# Patient Record
Sex: MALE | Race: WHITE | Employment: OTHER | URBAN - METROPOLITAN AREA
[De-identification: names, ages, dates, MRNs, and addresses within clinical notes are randomized per-mention and may not be internally consistent; named-entity substitution may affect disease eponyms.]

---

## 2018-07-02 PROBLEM — R06.02 SHORTNESS OF BREATH: Status: ACTIVE | Noted: 2018-07-02

## 2018-12-03 ENCOUNTER — HOSPITAL ENCOUNTER (OUTPATIENT)
Age: 58
Setting detail: SPECIMEN
Discharge: HOME | End: 2018-12-03
Payer: MEDICARE

## 2018-12-03 LAB
ALBUMIN SERPL-MCNC: 4.5 GM/DL (ref 3.4–5)
ALP BLD-CCNC: 65 IU/L (ref 40–129)
ALT SERPL-CCNC: 9 U/L (ref 10–40)
ANION GAP SERPL CALCULATED.3IONS-SCNC: 10 MMOL/L (ref 4–16)
AST SERPL-CCNC: 12 IU/L (ref 15–37)
BILIRUB SERPL-MCNC: 0.7 MG/DL (ref 0–1)
BUN BLDV-MCNC: 8 MG/DL (ref 6–23)
CALCIUM SERPL-MCNC: 8.9 MG/DL (ref 8.3–10.6)
CHLORIDE BLD-SCNC: 99 MMOL/L (ref 99–110)
CO2: 33 MMOL/L (ref 21–32)
CREAT SERPL-MCNC: 0.7 MG/DL (ref 0.9–1.3)
ERYTHROCYTE SEDIMENTATION RATE: 2 MM/HR (ref 0–20)
FERRITIN: 159 NG/ML (ref 30–400)
GFR AFRICAN AMERICAN: >60 ML/MIN/1.73M2
GFR NON-AFRICAN AMERICAN: >60 ML/MIN/1.73M2
GLUCOSE BLD-MCNC: 139 MG/DL (ref 70–99)
IRON: 77 UG/DL (ref 59–158)
LACTATE DEHYDROGENASE: 145 IU/L (ref 120–246)
PCT TRANSFERRIN: 24 % (ref 10–44)
POTASSIUM SERPL-SCNC: 4.9 MMOL/L (ref 3.5–5.1)
SODIUM BLD-SCNC: 142 MMOL/L (ref 135–145)
TOTAL IRON BINDING CAPACITY: 319 UG/DL (ref 250–450)
TOTAL PROTEIN: 6.8 GM/DL (ref 6.4–8.2)
UNSATURATED IRON BINDING CAPACITY: 242 UG/DL (ref 110–370)

## 2018-12-03 PROCEDURE — 80053 COMPREHEN METABOLIC PANEL: CPT

## 2018-12-03 PROCEDURE — 83550 IRON BINDING TEST: CPT

## 2018-12-03 PROCEDURE — 82668 ASSAY OF ERYTHROPOIETIN: CPT

## 2018-12-03 PROCEDURE — 85652 RBC SED RATE AUTOMATED: CPT

## 2018-12-03 PROCEDURE — 83615 LACTATE (LD) (LDH) ENZYME: CPT

## 2018-12-03 PROCEDURE — 82728 ASSAY OF FERRITIN: CPT

## 2018-12-03 PROCEDURE — 83540 ASSAY OF IRON: CPT

## 2018-12-06 LAB — ERYTHROPOIETIN: 10

## 2018-12-11 ENCOUNTER — HOSPITAL ENCOUNTER (OUTPATIENT)
Dept: ULTRASOUND IMAGING | Age: 58
Discharge: HOME | End: 2018-12-11
Payer: MEDICARE

## 2018-12-11 DIAGNOSIS — D75.1 ERYTHROCYTOSIS: ICD-10-CM

## 2018-12-11 PROCEDURE — 76700 US EXAM ABDOM COMPLETE: CPT

## 2019-01-03 ENCOUNTER — HOSPITAL ENCOUNTER (OUTPATIENT)
Dept: INFUSION THERAPY | Age: 59
Setting detail: INFUSION SERIES
Discharge: HOME | End: 2019-01-03
Payer: MEDICARE

## 2019-01-03 VITALS
DIASTOLIC BLOOD PRESSURE: 69 MMHG | SYSTOLIC BLOOD PRESSURE: 121 MMHG | HEIGHT: 69 IN | HEART RATE: 86 BPM | TEMPERATURE: 98 F | BODY MASS INDEX: 29.33 KG/M2 | RESPIRATION RATE: 16 BRPM | WEIGHT: 198 LBS | OXYGEN SATURATION: 93 %

## 2019-01-03 PROCEDURE — 99211 OFF/OP EST MAY X REQ PHY/QHP: CPT

## 2019-01-03 PROCEDURE — 99195 PHLEBOTOMY: CPT

## 2019-01-03 RX ORDER — TAMSULOSIN HYDROCHLORIDE 0.4 MG/1
0.4 CAPSULE ORAL DAILY
COMMUNITY
End: 2021-04-08

## 2019-01-03 RX ORDER — BUDESONIDE AND FORMOTEROL FUMARATE DIHYDRATE 80; 4.5 UG/1; UG/1
2 AEROSOL RESPIRATORY (INHALATION) 2 TIMES DAILY
COMMUNITY
End: 2020-06-25

## 2019-01-03 NOTE — DISCHARGE SUMMARY
Tolerated Therapeutic Phlebotomy well. Discharge instructions explained written copy given. Understanding verbalized. Discharged home. Down to private auto per self.

## 2019-01-03 NOTE — PROCEDURES
Pre-phlebotomy:  Pulse:  84   Blood Pressure:  125/76    Post-phlebotomy:  Pulse:  86         Blood Pressure 121/69  Volume Removed:  633 grams Tolerated well    Complications:  NONE

## 2019-01-30 ENCOUNTER — HOSPITAL ENCOUNTER (OUTPATIENT)
Age: 59
Setting detail: SPECIMEN
Discharge: HOME | End: 2019-01-30
Payer: MEDICARE

## 2019-01-30 LAB
ALBUMIN SERPL-MCNC: 4.6 GM/DL (ref 3.4–5)
ALP BLD-CCNC: 68 IU/L (ref 40–129)
ALT SERPL-CCNC: 13 U/L (ref 10–40)
ANION GAP SERPL CALCULATED.3IONS-SCNC: 10 MMOL/L (ref 4–16)
AST SERPL-CCNC: 13 IU/L (ref 15–37)
BILIRUB SERPL-MCNC: 0.3 MG/DL (ref 0–1)
BUN BLDV-MCNC: 14 MG/DL (ref 6–23)
CALCIUM SERPL-MCNC: 9.3 MG/DL (ref 8.3–10.6)
CHLORIDE BLD-SCNC: 96 MMOL/L (ref 99–110)
CO2: 35 MMOL/L (ref 21–32)
CREAT SERPL-MCNC: 0.7 MG/DL (ref 0.9–1.3)
GFR AFRICAN AMERICAN: >60 ML/MIN/1.73M2
GFR NON-AFRICAN AMERICAN: >60 ML/MIN/1.73M2
GLUCOSE BLD-MCNC: 142 MG/DL (ref 70–99)
LACTATE DEHYDROGENASE: 153 IU/L (ref 120–246)
POTASSIUM SERPL-SCNC: 5 MMOL/L (ref 3.5–5.1)
SODIUM BLD-SCNC: 141 MMOL/L (ref 135–145)
TOTAL PROTEIN: 7.2 GM/DL (ref 6.4–8.2)

## 2019-01-30 PROCEDURE — 83615 LACTATE (LD) (LDH) ENZYME: CPT

## 2019-01-30 PROCEDURE — 80053 COMPREHEN METABOLIC PANEL: CPT

## 2019-03-04 ENCOUNTER — HOSPITAL ENCOUNTER (OUTPATIENT)
Dept: CT IMAGING | Age: 59
Discharge: HOME | End: 2019-03-04
Payer: MEDICARE

## 2019-03-04 DIAGNOSIS — F17.200 TOBACCO USE DISORDER: ICD-10-CM

## 2019-03-04 PROCEDURE — G0297 LDCT FOR LUNG CA SCREEN: HCPCS

## 2019-06-26 ENCOUNTER — HOSPITAL ENCOUNTER (OUTPATIENT)
Dept: ULTRASOUND IMAGING | Age: 59
Discharge: HOME | End: 2019-06-26
Payer: MEDICARE

## 2019-06-26 DIAGNOSIS — N50.89 TESTICULAR SWELLING, LEFT: ICD-10-CM

## 2019-06-26 PROCEDURE — 76870 US EXAM SCROTUM: CPT

## 2019-06-26 PROCEDURE — 93975 VASCULAR STUDY: CPT

## 2019-06-28 ENCOUNTER — HOSPITAL ENCOUNTER (OUTPATIENT)
Age: 59
Discharge: HOME | End: 2019-06-28
Payer: MEDICARE

## 2019-06-28 LAB — PSA ULTRASENSITIVE: 1.23 NG/ML (ref 0–4)

## 2019-06-28 PROCEDURE — 36415 COLL VENOUS BLD VENIPUNCTURE: CPT

## 2019-06-28 PROCEDURE — 84153 ASSAY OF PSA TOTAL: CPT

## 2019-08-08 ENCOUNTER — HOSPITAL ENCOUNTER (OUTPATIENT)
Age: 59
Discharge: HOME | End: 2019-08-08
Payer: MEDICARE

## 2019-08-08 ENCOUNTER — HOSPITAL ENCOUNTER (OUTPATIENT)
Dept: MRI IMAGING | Age: 59
Discharge: HOME | End: 2019-08-08
Payer: MEDICARE

## 2019-08-08 DIAGNOSIS — D49.59 TESTICULAR NEOPLASM: ICD-10-CM

## 2019-08-08 LAB
GFR AFRICAN AMERICAN: >60 ML/MIN/1.73M2
GFR NON-AFRICAN AMERICAN: >60 ML/MIN/1.73M2
LACTATE DEHYDROGENASE: 156 IU/L (ref 120–246)
POC CREATININE: 1 MG/DL (ref 0.9–1.3)
PSA ULTRASENSITIVE: 1.07 NG/ML (ref 0–4)

## 2019-08-08 PROCEDURE — 83615 LACTATE (LD) (LDH) ENZYME: CPT

## 2019-08-08 PROCEDURE — 84702 CHORIONIC GONADOTROPIN TEST: CPT

## 2019-08-08 PROCEDURE — 84153 ASSAY OF PSA TOTAL: CPT

## 2019-08-08 PROCEDURE — 72195 MRI PELVIS W/O DYE: CPT

## 2019-08-08 PROCEDURE — 82105 ALPHA-FETOPROTEIN SERUM: CPT

## 2019-08-08 PROCEDURE — 36415 COLL VENOUS BLD VENIPUNCTURE: CPT

## 2019-08-10 LAB
HCG TUMOR MARKER: <1 IU/L (ref 0–3)
HCG TUMOR MARKER: NORMAL IU/L (ref 0–3)
MS ALPHA-FETOPROTEIN: 1 NG/ML (ref 0–9)
MS ALPHA-FETOPROTEIN: NORMAL NG/ML (ref 0–9)

## 2019-08-27 ENCOUNTER — HOSPITAL ENCOUNTER (OUTPATIENT)
Age: 59
Setting detail: SPECIMEN
Discharge: HOME | End: 2019-08-27
Payer: MEDICARE

## 2019-08-27 LAB
ALBUMIN SERPL-MCNC: 4.2 GM/DL (ref 3.4–5)
ALP BLD-CCNC: 61 IU/L (ref 40–129)
ALT SERPL-CCNC: 12 U/L (ref 10–40)
ANION GAP SERPL CALCULATED.3IONS-SCNC: 8 MMOL/L (ref 4–16)
AST SERPL-CCNC: 13 IU/L (ref 15–37)
BILIRUB SERPL-MCNC: 0.3 MG/DL (ref 0–1)
BUN BLDV-MCNC: 10 MG/DL (ref 6–23)
CALCIUM SERPL-MCNC: 9.5 MG/DL (ref 8.3–10.6)
CHLORIDE BLD-SCNC: 97 MMOL/L (ref 99–110)
CO2: 34 MMOL/L (ref 21–32)
CREAT SERPL-MCNC: 0.8 MG/DL (ref 0.9–1.3)
GFR AFRICAN AMERICAN: >60 ML/MIN/1.73M2
GFR NON-AFRICAN AMERICAN: >60 ML/MIN/1.73M2
GLUCOSE BLD-MCNC: 121 MG/DL (ref 70–99)
LACTATE DEHYDROGENASE: 156 IU/L (ref 120–246)
POTASSIUM SERPL-SCNC: 5.1 MMOL/L (ref 3.5–5.1)
SODIUM BLD-SCNC: 139 MMOL/L (ref 135–145)
TOTAL PROTEIN: 6.6 GM/DL (ref 6.4–8.2)

## 2019-08-27 PROCEDURE — 80053 COMPREHEN METABOLIC PANEL: CPT

## 2019-08-27 PROCEDURE — 83615 LACTATE (LD) (LDH) ENZYME: CPT

## 2019-12-23 ENCOUNTER — HOSPITAL ENCOUNTER (OUTPATIENT)
Age: 59
Setting detail: SPECIMEN
Discharge: HOME | End: 2019-12-23
Payer: MEDICARE

## 2019-12-23 LAB
ALBUMIN SERPL-MCNC: 4.5 GM/DL (ref 3.4–5)
ALP BLD-CCNC: 72 IU/L (ref 40–129)
ALT SERPL-CCNC: 12 U/L (ref 10–40)
ANION GAP SERPL CALCULATED.3IONS-SCNC: 13 MMOL/L (ref 4–16)
AST SERPL-CCNC: 14 IU/L (ref 15–37)
BILIRUB SERPL-MCNC: 0.5 MG/DL (ref 0–1)
BUN BLDV-MCNC: 12 MG/DL (ref 6–23)
CALCIUM SERPL-MCNC: 9.5 MG/DL (ref 8.3–10.6)
CHLORIDE BLD-SCNC: 93 MMOL/L (ref 99–110)
CO2: 32 MMOL/L (ref 21–32)
CREAT SERPL-MCNC: 0.7 MG/DL (ref 0.9–1.3)
GFR AFRICAN AMERICAN: >60 ML/MIN/1.73M2
GFR NON-AFRICAN AMERICAN: >60 ML/MIN/1.73M2
GLUCOSE BLD-MCNC: 125 MG/DL (ref 70–99)
LACTATE DEHYDROGENASE: 182 IU/L (ref 120–246)
POTASSIUM SERPL-SCNC: 5.3 MMOL/L (ref 3.5–5.1)
SODIUM BLD-SCNC: 138 MMOL/L (ref 135–145)
TOTAL PROTEIN: 7.4 GM/DL (ref 6.4–8.2)

## 2019-12-23 PROCEDURE — 80053 COMPREHEN METABOLIC PANEL: CPT

## 2019-12-23 PROCEDURE — 83615 LACTATE (LD) (LDH) ENZYME: CPT

## 2020-04-14 PROBLEM — F17.210 CONTINUOUS DEPENDENCE ON CIGARETTE SMOKING: Status: ACTIVE | Noted: 2020-04-14

## 2020-04-14 PROBLEM — D75.1 SECONDARY POLYCYTHEMIA: Status: ACTIVE | Noted: 2020-04-14

## 2020-05-27 ENCOUNTER — HOSPITAL ENCOUNTER (OUTPATIENT)
Dept: CT IMAGING | Age: 60
Discharge: HOME | End: 2020-05-27
Payer: MEDICARE

## 2020-05-27 PROCEDURE — G0297 LDCT FOR LUNG CA SCREEN: HCPCS

## 2020-06-05 ENCOUNTER — HOSPITAL ENCOUNTER (OUTPATIENT)
Dept: INFUSION THERAPY | Age: 60
End: 2020-06-05
Payer: MEDICARE

## 2020-06-05 ENCOUNTER — HOSPITAL ENCOUNTER (OUTPATIENT)
Dept: INFUSION THERAPY | Age: 60
Discharge: HOME | End: 2020-06-05
Payer: MEDICARE

## 2020-06-05 LAB
ALBUMIN SERPL-MCNC: 4.4 GM/DL (ref 3.4–5)
ALP BLD-CCNC: 83 IU/L (ref 40–129)
ALT SERPL-CCNC: 20 U/L (ref 10–40)
ANION GAP SERPL CALCULATED.3IONS-SCNC: 10 MMOL/L (ref 4–16)
AST SERPL-CCNC: 18 IU/L (ref 15–37)
BASOPHILS ABSOLUTE: 0.1 K/CU MM
BASOPHILS RELATIVE PERCENT: 1.1 % (ref 0–1)
BILIRUB SERPL-MCNC: 0.5 MG/DL (ref 0–1)
BUN BLDV-MCNC: 11 MG/DL (ref 6–23)
CALCIUM SERPL-MCNC: 8.5 MG/DL (ref 8.3–10.6)
CHLORIDE BLD-SCNC: 94 MMOL/L (ref 99–110)
CO2: 35 MMOL/L (ref 21–32)
CREAT SERPL-MCNC: 0.8 MG/DL (ref 0.9–1.3)
DIFFERENTIAL TYPE: ABNORMAL
EOSINOPHILS ABSOLUTE: 0.5 K/CU MM
EOSINOPHILS RELATIVE PERCENT: 4.7 % (ref 0–3)
GFR AFRICAN AMERICAN: >60 ML/MIN/1.73M2
GFR NON-AFRICAN AMERICAN: >60 ML/MIN/1.73M2
GLUCOSE BLD-MCNC: 240 MG/DL (ref 70–99)
HCT VFR BLD CALC: 57.4 % (ref 42–52)
HEMOGLOBIN: 19.3 GM/DL (ref 13.5–18)
LACTATE DEHYDROGENASE: 209 IU/L (ref 120–246)
LYMPHOCYTES ABSOLUTE: 1.8 K/CU MM
LYMPHOCYTES RELATIVE PERCENT: 17.8 % (ref 24–44)
MCH RBC QN AUTO: 28.4 PG (ref 27–31)
MCHC RBC AUTO-ENTMCNC: 33.6 % (ref 32–36)
MCV RBC AUTO: 84.5 FL (ref 78–100)
MONOCYTES ABSOLUTE: 1.4 K/CU MM
MONOCYTES RELATIVE PERCENT: 13.8 % (ref 0–4)
PDW BLD-RTO: 18.2 % (ref 11.7–14.9)
PLATELET # BLD: 177 K/CU MM (ref 140–440)
PMV BLD AUTO: 9.9 FL (ref 7.5–11.1)
POTASSIUM SERPL-SCNC: 4.9 MMOL/L (ref 3.5–5.1)
RBC # BLD: 6.79 M/CU MM (ref 4.6–6.2)
SEGMENTED NEUTROPHILS ABSOLUTE COUNT: 6.3 K/CU MM
SEGMENTED NEUTROPHILS RELATIVE PERCENT: 62.6 % (ref 36–66)
SODIUM BLD-SCNC: 139 MMOL/L (ref 135–145)
TOTAL PROTEIN: 7.3 GM/DL (ref 6.4–8.2)
WBC # BLD: 10.1 K/CU MM (ref 4–10.5)

## 2020-06-05 PROCEDURE — 80053 COMPREHEN METABOLIC PANEL: CPT

## 2020-06-05 PROCEDURE — 99211 OFF/OP EST MAY X REQ PHY/QHP: CPT

## 2020-06-05 PROCEDURE — 36415 COLL VENOUS BLD VENIPUNCTURE: CPT

## 2020-06-05 PROCEDURE — 83615 LACTATE (LD) (LDH) ENZYME: CPT

## 2020-06-05 PROCEDURE — 85025 COMPLETE CBC W/AUTO DIFF WBC: CPT

## 2020-06-22 NOTE — PROGRESS NOTES
mellitus ([facility] [address]     Enlarged prostate     Hyperlipidemia     Hypertension        Current Medications:      Current Outpatient Medications:     Nebulizers (COMPRESSOR/NEBULIZER) MISC, 1 Units by Does not apply route every 4-6 hours as needed (for use with respiratory nebulized medications -), Disp: 1 each, Rfl: 0    ipratropium-albuterol (DUONEB) 0.5-2.5 (3) MG/3ML SOLN nebulizer solution, Inhale 3 mLs into the lungs every 6 hours, Disp: 360 mL, Rfl: 5    albuterol sulfate HFA (PROVENTIL HFA) 108 (90 Base) MCG/ACT inhaler, Inhale 2 puffs into the lungs every 6 hours as needed for Wheezing or Shortness of Breath, Disp: 1 Inhaler, Rfl: 5    SYMBICORT 160-4.5 MCG/ACT AERO, Inhale 2 puffs into the lungs 2 times daily, Disp: 1 Inhaler, Rfl: 5    tamsulosin (FLOMAX) 0.4 MG capsule, Take 0.4 mg by mouth daily, Disp: , Rfl:     albuterol sulfate HFA (PROVENTIL HFA) 108 (90 Base) MCG/ACT inhaler, Inhale 2 puffs into the lungs every 6 hours as needed for Wheezing or Shortness of Breath, Disp: 1 Inhaler, Rfl: 0    metFORMIN (GLUCOPHAGE) 500 MG tablet, Take 1 tablet by mouth 2 times daily (with meals), Disp: 60 tablet, Rfl: 0    glucose monitoring kit (FREESTYLE) monitoring kit, 1 kit by Does not apply route 4 times daily (with meals and nightly) This can be any brand of glucometer, Disp: 1 kit, Rfl: 0    Lancets MISC, Diabetic Lancets To be used qAC and qHS Can be any brand of lancets, Disp: 100 each, Rfl: 0    blood glucose monitor strips, 1 strip by Other route 4 times daily (with meals and nightly) Can be any brand of glucometer strips, Disp: 100 strip, Rfl: 1    furosemide (LASIX) 20 MG tablet, Take 1 tablet by mouth daily as needed (leg swelling), Disp: 15 tablet, Rfl: 0    Naproxen Sodium (ALEVE) 220 MG CAPS, Take by mouth, Disp: , Rfl:     No Known Allergies    Social History:    Social History     Socioeconomic History    Marital status: Single     Spouse name: None    Number of children: None    Years of education: None    Highest education level: None   Occupational History    None   Social Needs    Financial resource strain: None    Food insecurity     Worry: None     Inability: None    Transportation needs     Medical: None     Non-medical: None   Tobacco Use    Smoking status: Current Every Day Smoker     Packs/day: 0.50     Types: Cigarettes    Smokeless tobacco: Never Used   Substance and Sexual Activity    Alcohol use: No    Drug use: No    Sexual activity: None     Comment: deferred   Lifestyle    Physical activity     Days per week: None     Minutes per session: None    Stress: None   Relationships    Social connections     Talks on phone: None     Gets together: None     Attends Confucianist service: None     Active member of club or organization: None     Attends meetings of clubs or organizations: None     Relationship status: None    Intimate partner violence     Fear of current or ex partner: None     Emotionally abused: None     Physically abused: None     Forced sexual activity: None   Other Topics Concern    None   Social History Narrative    None       Family History:    No family history on file. REVIEW OF SYSTEMS:    CONSTITUTIONAL:  negative for fevers, chills, diaphoresis, activity change, appetite change, night sweats and unexpected weight change.    HEENT:  negative for hearing loss,  sinus pressure, nasal congestion, epistaxis and snoring  RESPIRATORY:  + SOB,   CARDIOVASCULAR:  Negative for chest pain, palpitations, exertional chest pressure/discomfort, edema, syncope  GASTROINTESTINAL: negative for nausea, vomiting, diarrhea, constipation, blood in stool and abdominal pain  GENITOURINARY:  negative for frequency, dysuria and hematuria  HEMATOLOGIC/LYMPHATIC:  negative for easy bruising, bleeding and lymphadenopathy  ALLERGIC/IMMUNOLOGIC:  negative for recurrent infections, angioedema, anaphylaxis and drug reaction  MUSCULOSKELETAL:  negative for  pain, joint swelling,

## 2020-06-25 ENCOUNTER — INITIAL CONSULT (OUTPATIENT)
Dept: PULMONOLOGY | Age: 60
End: 2020-06-25
Payer: MEDICARE

## 2020-06-25 VITALS
SYSTOLIC BLOOD PRESSURE: 134 MMHG | HEART RATE: 116 BPM | HEIGHT: 70 IN | WEIGHT: 223 LBS | DIASTOLIC BLOOD PRESSURE: 62 MMHG | BODY MASS INDEX: 31.92 KG/M2 | OXYGEN SATURATION: 84 %

## 2020-06-25 PROCEDURE — G8417 CALC BMI ABV UP PARAM F/U: HCPCS | Performed by: NURSE PRACTITIONER

## 2020-06-25 PROCEDURE — 99203 OFFICE O/P NEW LOW 30 MIN: CPT | Performed by: NURSE PRACTITIONER

## 2020-06-25 PROCEDURE — 3023F SPIROM DOC REV: CPT | Performed by: NURSE PRACTITIONER

## 2020-06-25 PROCEDURE — G8427 DOCREV CUR MEDS BY ELIG CLIN: HCPCS | Performed by: NURSE PRACTITIONER

## 2020-06-25 PROCEDURE — G8926 SPIRO NO PERF OR DOC: HCPCS | Performed by: NURSE PRACTITIONER

## 2020-06-25 RX ORDER — ALBUTEROL SULFATE 90 UG/1
2 AEROSOL, METERED RESPIRATORY (INHALATION) EVERY 6 HOURS PRN
Qty: 1 INHALER | Refills: 5 | Status: SHIPPED | OUTPATIENT
Start: 2020-06-25 | End: 2021-04-08 | Stop reason: SDUPTHER

## 2020-06-25 RX ORDER — BUDESONIDE AND FORMOTEROL FUMARATE DIHYDRATE 160; 4.5 UG/1; UG/1
2 AEROSOL RESPIRATORY (INHALATION) 2 TIMES DAILY
Qty: 1 INHALER | Refills: 5 | Status: SHIPPED | OUTPATIENT
Start: 2020-06-25 | End: 2021-03-05

## 2020-06-25 RX ORDER — IPRATROPIUM BROMIDE AND ALBUTEROL SULFATE 2.5; .5 MG/3ML; MG/3ML
1 SOLUTION RESPIRATORY (INHALATION) EVERY 6 HOURS
Qty: 360 ML | Refills: 5 | Status: SHIPPED | OUTPATIENT
Start: 2020-06-25 | End: 2021-04-08

## 2020-06-25 RX ORDER — BUDESONIDE AND FORMOTEROL FUMARATE DIHYDRATE 160; 4.5 UG/1; UG/1
2 AEROSOL RESPIRATORY (INHALATION) 2 TIMES DAILY
Qty: 1 INHALER | Refills: 5 | Status: SHIPPED | OUTPATIENT
Start: 2020-06-25 | End: 2020-06-25

## 2020-06-25 NOTE — PATIENT INSTRUCTIONS
Patient Education        Learning About COPD  What is COPD? COPD is a lung disease that makes it hard to breathe. COPD stands for chronic obstructive pulmonary disease. It is caused by damage to the lungs over many years, usually from smoking. COPD is a mix of two diseases: chronic bronchitis and emphysema. Other things that may put you at risk for COPD include breathing chemical fumes, dust, or air pollution over a long period of time. Secondhand smoke is also bad. In chronic bronchitis, the airways that carry air to the lungs (bronchial tubes) get inflamed and make a lot of mucus. This can narrow or block the airways, making it hard for you to breathe. In emphysema, the air sacs in your lungs are damaged and lose their stretch. Less air gets in and out of your lungs, which makes you feel short of breath. What can you expect when you have COPD? COPD gets worse over time. You cannot undo the damage to your lungs. Over time, you may find that:  · You get short of breath even when you do simple things like get dressed or fix a meal.  · It is hard to eat or exercise. · You lose weight and feel weaker. But there are things you can do to prevent more damage and feel better. What are the symptoms? The main symptoms are:  · A cough that will not go away. · Mucus that comes up when you cough. · Shortness of breath that gets worse with activity. At times, your symptoms may suddenly flare up and get much worse. This is a called a COPD exacerbation (say \"egg-ROBERT--BAY-ina\"). When this happens, your usual symptoms quickly get worse and stay bad. This can be dangerous. You may have to go to the hospital.  How can you keep COPD from getting worse? Not smoking is the best way to keep COPD from getting worse. If you need help quitting, talk to your doctor about stop-smoking programs and medicines. Also, be sure to get your flu and pneumococcal vaccines.  And avoid air pollution, fumes, and dust as much as you stroke, blood vessel disease, and blindness from macular degeneration. · When you're smoke-free, you get sick less often, and you heal faster. You are less likely to get colds, flu, bronchitis, and pneumonia. · As a nonsmoker, you may find that your mood is better and you are less stressed. When and how will you feel healthier? Quitting has real health benefits that start from day 1 of being smoke-free. And the longer you stay smoke-free, the healthier you get and the better you feel. The first hours  · After just 20 minutes, your blood pressure and heart rate go down. That means there's less stress on your heart and blood vessels. · Within 12 hours, the level of carbon monoxide in your blood drops back to normal. That makes room for more oxygen. With more oxygen in your body, you may notice that you have more energy than when you smoked. After 2 weeks  · Your lungs start to work better. · Your risk of heart attack starts to drop. After 1 month  · When your lungs are clear, you cough less and breathe deeper, so it's easier to be active. · Your sense of taste and smell return. That means you can enjoy food more than you have since you started smoking. Over the years  · Over the years, your risks of heart disease, heart attack, and stroke are lower. · After 10 years, your risk of dying from lung cancer is cut by about half. And your risk for many other types of cancer is lower too. How would quitting help others in your life? When you quit smoking, you improve the health of everyone who now breathes in your smoke. · Their heart, lung, and cancer risks drop, much like yours. · They are sick less. For babies and small children, living smoke-free means they're less likely to have ear infections, pneumonia, and bronchitis. · If you're a woman who is or will be pregnant someday, quitting smoking means a healthier . · Children who are close to you are less likely to become adult smokers.   Where can

## 2020-06-29 RX ORDER — BUDESONIDE AND FORMOTEROL FUMARATE DIHYDRATE 160; 4.5 UG/1; UG/1
2 AEROSOL RESPIRATORY (INHALATION) 2 TIMES DAILY
Qty: 1 INHALER | Refills: 0 | COMMUNITY
Start: 2020-06-29 | End: 2021-03-05

## 2020-07-09 ENCOUNTER — TELEPHONE (OUTPATIENT)
Dept: PULMONOLOGY | Age: 60
End: 2020-07-09

## 2020-07-09 NOTE — TELEPHONE ENCOUNTER
Spoke with Evelina Ramirez in regards to his apnea link, demonstrates AHI of 1.7 no sleep apnea noted,

## 2020-12-07 NOTE — PROGRESS NOTES
Patient Name: Rustam Sullivan  Patient : 1960  Patient MRN: [id number]     Primary Oncologist: Ella Fischer MD  Referring Provider: No primary care provider on file. Date of Service: 2020      Chief Complaint:   Chief Complaint   Patient presents with    Discuss Labs     Patient Active Problem List:     Shortness of breath     Continuous dependence on cigarette smoking     Secondary polycythemia    HPI:   Mr. Kirti Brewster is a 67-year-old very pleasant gentleman with medical history significant for hypertension, hyperlipidemia, diabetes mellitus, COPD, osteoarthritis, obesity, initially referred to me on December 3, 2018 for evaluation of erythrocytosis. Mr. Kirti Brewster was found to have erythrocytosis since . He recently established care with primary care physician, Dr. Dorothy Francisco on 2018. Routine blood tests done on 2018 showed persistent erythrocytosis. He is a chronic smoker and he started smoking since he was 16. He used to smoke ~ 2 PPD before and he is currently smoking about 10 - 15 cigarettes per day. Since he is found to have persistent erythrocytosis, he was subsequently referred to me for further evaluation. He denied high altitude living. He does not have any family history of erythrocytosis. Laboratory work ups done on 12/3/18 showed normal erythropoietin level, normal ESR, LDH, iron study. JAK2 V617F mutation was negative. Abdominal sonogram done on 18 showed no splenomegaly or splenic mass. Limited evaluation liver, though grossly unremarkable. Focal area of gallbladder wall thickening measuring 5.9 mm, though limited with regards to scanning technique per the technologist. The remainder the gallbladder appears unremarkable. Likely clinically insignificant. If clinically concerned, evaluation for cystic duct obstruction could be performed with nuclear medicine imaging.     Low dose screening CT scan of chest done on 3/4/19 showed 0.4 cm solid nodule in the right lower lobe. Sequelae of granulomatous disease. Minimal coronary atherosclerotic calcifications. Continue annual lung screening with LDCT in 12 months. (probability of malignancy <1%). CT lung screening on May 27, 2020 showed stable appearance of chronic bronchitis with nonobstructive right mainstream bronchus dependent secretions. Stable chronic right hemidiaphragm elevation resulting in chronic lung base atelectasis. No acute thoracic abnormality or findings concerning for malignancy. On December 8, 2020, he presented to me for follow up. I have been following him for secondary erythrocytosis and he received one phlebotomy on 1/3/19. He stated that his symptoms has improved some with phlebotomy. He has been under observation since then. Since low dose CT chest didn't reveal any significant suspicious lesion, I recommend him to have repeat CT chest in March 2021. I recognized that his hematocrit today was 55% and I recommend him to have one phlebotomy. If his symptoms improve with phlebotomy, I will recommend to keep his hematocrit less than 50%. He stopped smoking since 11/2020. Otherwise, I will see him back in 4 months. I will set up for screening CT chest in next office visit. He doesn't have any significant symptoms at today visit. Past Medical History  Significant for  1. Hypertension  2. Hyperlipidemia  3. Diabetes mellitus4. COPD  5. Osteoarthritis  6. Obesity    Surgical History  Significant for  1. Cyst removal from his tailbone area. Allergies  No known medication allergies    Social History  He is a former smoker and he quit smoking on 11/2020. He started smoking since he was 14, until 11/2020. He used to smoke 2 pack/day. He denies alcohol drinking and illicit drug abuse. He is currently living in [facility]. Family History  Grandmother - Maternal (2nd Degree): Breast cancer    Oncology History    No history exists.      Review of Systems: \"Per interval history; otherwise 10 point ROS is negative. \"  His energy level is low, appetite and sleep are fair. He doesn't have fever, chills, night sweats, cough, shortness of breath, chest pain, hemoptysis, or palpitations.  His bowel and bladder functions are normal. He doesn't have nausea, vomiting, diarrhea, constipation, dysuria, loss of appetite, or weight loss. He denies neuropathy and he doesn't have bleeding or clotting issues. He doesn't have any pain on today visit. Denies anxiety or depression. The rest of the systems are unremarkable. Vital Signs:  BP (!) 156/78 (Site: Right Upper Arm, Position: Sitting, Cuff Size: Large Adult)   Pulse 101   Temp 96.7 °F (35.9 °C) (Infrared)   Resp 16   Ht 5' 10\" (1.778 m)   Wt 215 lb 9.6 oz (97.8 kg)   SpO2 (!) 88%   BMI 30.94 kg/m²     Physical Exam:  CONSTITUTIONAL: awake, no apparent distress, alert, cooperative,   EYES: pupils equal, round and reactive to light, sclera clear and conjunctiva normal  ENT: Normocephalic, without obvious abnormality, atraumatic  NECK: supple, symmetrical, no jugular venous distension and no carotid bruits   HEMATOLOGIC/LYMPHATIC: no cervical, supraclavicular or axillary lymphadenopathy   LUNGS: VBS, no wheezes, no crackles, no rhonchi, no increased work of breathing and clear to auscultation   CARDIOVASCULAR: regular rate and rhythm, normal S1 and S2, no murmur noted  ABDOMEN: normal bowel sounds x 4, non-tender, no masses palpated, no hepatosplenomegaly, soft, non-distended,    MUSCULOSKELETAL: full range of motion noted, tone is normal  NEUROLOGIC: awake, alert, oriented to name, place and time. Motor skills grossly intact. SKIN: Normal skin color, texture, turgor and no jaundice.  appears intact   EXTREMITIES: no LE edema, no leg swelling, no cyanosis, no clubbing     Labs:  Hematology:  Lab Results   Component Value Date    WBC 8.5 12/08/2020    RBC 6.59 (H) 12/08/2020    HGB 18.6 (H) 12/08/2020    HCT Abdominal sonogram is also negative for erythropoietin producing tumor. Since all the work ups were negative and I believe his erythrocytosis is due to hypoxemia from chronic cigarette smoking. CT lung screening on May 27, 2020 showed stable appearance of chronic bronchitis with nonobstructive right mainstream bronchus dependent secretions. Stable chronic right hemidiaphragm elevation resulting in chronic lung base atelectasis. No acute thoracic abnormality or findings concerning for malignancy. On December 8, 2020, he presented to me for follow up. I have been following him for secondary erythrocytosis and he received one phlebotomy on 1/3/19. He stated that his symptoms has improved some with phlebotomy. He has been under observation since then. Since low dose CT chest didn't reveal any significant suspicious lesion, I recommend him to have repeat CT chest in March 2021. I recognized that his hematocrit today was 55% and I recommend him to have one phlebotomy. If his symptoms improve with phlebotomy, I will recommend to keep his hematocrit less than 50%. He stopped smoking since 11/2020. Otherwise, I will see him back in 4 months. I will set up for screening CT chest in next office visit. I answered all his questions and concerns for today. I asked him to follow-up with primary care physician on regular basis. Recent imaging and labs were reviewed and discussed with the patient.

## 2020-12-08 ENCOUNTER — HOSPITAL ENCOUNTER (OUTPATIENT)
Dept: INFUSION THERAPY | Age: 60
Discharge: HOME | End: 2020-12-08
Payer: MEDICARE

## 2020-12-08 ENCOUNTER — TELEPHONE (OUTPATIENT)
Dept: INFUSION THERAPY | Age: 60
End: 2020-12-08

## 2020-12-08 ENCOUNTER — OFFICE VISIT (OUTPATIENT)
Dept: ONCOLOGY | Age: 60
End: 2020-12-08
Payer: MEDICARE

## 2020-12-08 ENCOUNTER — CLINICAL DOCUMENTATION (OUTPATIENT)
Dept: ONCOLOGY | Age: 60
End: 2020-12-08

## 2020-12-08 VITALS
HEART RATE: 101 BPM | SYSTOLIC BLOOD PRESSURE: 156 MMHG | TEMPERATURE: 96.7 F | WEIGHT: 215.6 LBS | OXYGEN SATURATION: 88 % | BODY MASS INDEX: 30.86 KG/M2 | DIASTOLIC BLOOD PRESSURE: 78 MMHG | HEIGHT: 70 IN | RESPIRATION RATE: 16 BRPM

## 2020-12-08 DIAGNOSIS — D75.1 SECONDARY POLYCYTHEMIA: ICD-10-CM

## 2020-12-08 LAB
ALBUMIN SERPL-MCNC: 4.3 GM/DL (ref 3.4–5)
ALP BLD-CCNC: 76 IU/L (ref 40–128)
ALT SERPL-CCNC: 14 U/L (ref 10–40)
ANION GAP SERPL CALCULATED.3IONS-SCNC: 12 MMOL/L (ref 4–16)
AST SERPL-CCNC: 15 IU/L (ref 15–37)
BASOPHILS ABSOLUTE: 0.1 K/CU MM
BASOPHILS RELATIVE PERCENT: 0.8 % (ref 0–1)
BILIRUB SERPL-MCNC: 0.8 MG/DL (ref 0–1)
BUN BLDV-MCNC: 9 MG/DL (ref 6–23)
CALCIUM SERPL-MCNC: 9.1 MG/DL (ref 8.3–10.6)
CHLORIDE BLD-SCNC: 94 MMOL/L (ref 99–110)
CO2: 32 MMOL/L (ref 21–32)
CREAT SERPL-MCNC: 0.7 MG/DL (ref 0.9–1.3)
DIFFERENTIAL TYPE: ABNORMAL
EOSINOPHILS ABSOLUTE: 0.4 K/CU MM
EOSINOPHILS RELATIVE PERCENT: 4.8 % (ref 0–3)
ERYTHROCYTE SEDIMENTATION RATE: 4 MM/HR (ref 0–20)
GFR AFRICAN AMERICAN: >60 ML/MIN/1.73M2
GFR NON-AFRICAN AMERICAN: >60 ML/MIN/1.73M2
GLUCOSE BLD-MCNC: 236 MG/DL (ref 70–99)
HCT VFR BLD CALC: 55 % (ref 42–52)
HEMOGLOBIN: 18.6 GM/DL (ref 13.5–18)
LACTATE DEHYDROGENASE: 183 IU/L (ref 120–246)
LYMPHOCYTES ABSOLUTE: 1.8 K/CU MM
LYMPHOCYTES RELATIVE PERCENT: 20.7 % (ref 24–44)
MCH RBC QN AUTO: 28.2 PG (ref 27–31)
MCHC RBC AUTO-ENTMCNC: 33.8 % (ref 32–36)
MCV RBC AUTO: 83.5 FL (ref 78–100)
MONOCYTES ABSOLUTE: 1.3 K/CU MM
MONOCYTES RELATIVE PERCENT: 15 % (ref 0–4)
PDW BLD-RTO: 18.4 % (ref 11.7–14.9)
PLATELET # BLD: 171 K/CU MM (ref 140–440)
PMV BLD AUTO: 9.4 FL (ref 7.5–11.1)
POTASSIUM SERPL-SCNC: 4.4 MMOL/L (ref 3.5–5.1)
RBC # BLD: 6.59 M/CU MM (ref 4.6–6.2)
SEGMENTED NEUTROPHILS ABSOLUTE COUNT: 5 K/CU MM
SEGMENTED NEUTROPHILS RELATIVE PERCENT: 58.7 % (ref 36–66)
SODIUM BLD-SCNC: 138 MMOL/L (ref 135–145)
TOTAL PROTEIN: 6.9 GM/DL (ref 6.4–8.2)
WBC # BLD: 8.5 K/CU MM (ref 4–10.5)

## 2020-12-08 PROCEDURE — 99211 OFF/OP EST MAY X REQ PHY/QHP: CPT

## 2020-12-08 PROCEDURE — 4004F PT TOBACCO SCREEN RCVD TLK: CPT | Performed by: INTERNAL MEDICINE

## 2020-12-08 PROCEDURE — 83615 LACTATE (LD) (LDH) ENZYME: CPT

## 2020-12-08 PROCEDURE — 80053 COMPREHEN METABOLIC PANEL: CPT

## 2020-12-08 PROCEDURE — G8484 FLU IMMUNIZE NO ADMIN: HCPCS | Performed by: INTERNAL MEDICINE

## 2020-12-08 PROCEDURE — G8427 DOCREV CUR MEDS BY ELIG CLIN: HCPCS | Performed by: INTERNAL MEDICINE

## 2020-12-08 PROCEDURE — 3017F COLORECTAL CA SCREEN DOC REV: CPT | Performed by: INTERNAL MEDICINE

## 2020-12-08 PROCEDURE — 85652 RBC SED RATE AUTOMATED: CPT

## 2020-12-08 PROCEDURE — 85025 COMPLETE CBC W/AUTO DIFF WBC: CPT

## 2020-12-08 PROCEDURE — 99213 OFFICE O/P EST LOW 20 MIN: CPT | Performed by: INTERNAL MEDICINE

## 2020-12-08 PROCEDURE — G8417 CALC BMI ABV UP PARAM F/U: HCPCS | Performed by: INTERNAL MEDICINE

## 2020-12-08 PROCEDURE — 36415 COLL VENOUS BLD VENIPUNCTURE: CPT

## 2020-12-08 ASSESSMENT — PATIENT HEALTH QUESTIONNAIRE - PHQ9
SUM OF ALL RESPONSES TO PHQ QUESTIONS 1-9: 0
SUM OF ALL RESPONSES TO PHQ9 QUESTIONS 1 & 2: 0
SUM OF ALL RESPONSES TO PHQ QUESTIONS 1-9: 0
2. FEELING DOWN, DEPRESSED OR HOPELESS: 0
1. LITTLE INTEREST OR PLEASURE IN DOING THINGS: 0
SUM OF ALL RESPONSES TO PHQ QUESTIONS 1-9: 0

## 2020-12-08 NOTE — PROGRESS NOTES
MA Rooming Questions  Patient: Micheal Celeste  MRN: [id number]    Date: 12/8/2020        1. Do you have any new issues?   no         2. Do you need any refills on medications?    no    3. Have you had any imaging done since your last visit?   no    4. Have you been hospitalized or seen in the emergency room since your last visit here?   no    5. Did the patient have a depression screening completed today?  Yes    PHQ-9 Total Score: 0 (12/8/2020  9:35 AM)       PHQ-9 Given to (if applicable):               PHQ-9 Score (if applicable):                     [] Positive     []  Negative              Does question #9 need addressed (if applicable)                     [] Yes    []  No               Willene Brunner, MA

## 2020-12-23 ENCOUNTER — HOSPITAL ENCOUNTER (OUTPATIENT)
Dept: INFUSION THERAPY | Age: 60
Setting detail: INFUSION SERIES
Discharge: HOME | End: 2020-12-23
Payer: MEDICARE

## 2020-12-23 VITALS
RESPIRATION RATE: 16 BRPM | DIASTOLIC BLOOD PRESSURE: 69 MMHG | TEMPERATURE: 97.3 F | SYSTOLIC BLOOD PRESSURE: 119 MMHG | HEART RATE: 101 BPM

## 2020-12-23 DIAGNOSIS — D75.1 SECONDARY POLYCYTHEMIA: Primary | ICD-10-CM

## 2020-12-23 PROCEDURE — 99211 OFF/OP EST MAY X REQ PHY/QHP: CPT

## 2020-12-23 PROCEDURE — 99195 PHLEBOTOMY: CPT

## 2020-12-23 NOTE — PROGRESS NOTES
Pt taken to room 00 for phlebotomy. Pt oriented to room, call light, bed/chair controls, TV, pt voiced understanding. Plan of care explained to pt, pt voiced understanding.

## 2020-12-23 NOTE — PROGRESS NOTES
Diagnosis: POYCYTHEMIA VERA    Pre-Phlebotomy: BP 12/77,     Post-Phlebotomy: /69,     Volume Removed: 051 grams    Complications: NONE    Comments: NONE        LOT# [id number]    Exp: 7-2022      Emma Jones

## 2020-12-23 NOTE — PROGRESS NOTES
Pt tolerated well. Discharged instructions given to pt, pt voiced understanding. Pt discharged via AMBULATORY by SELF TO EXIT.

## 2021-03-05 DIAGNOSIS — J44.9 CHRONIC OBSTRUCTIVE PULMONARY DISEASE, UNSPECIFIED COPD TYPE (HCC): ICD-10-CM

## 2021-03-05 DIAGNOSIS — R09.02 HYPOXIA: ICD-10-CM

## 2021-03-05 RX ORDER — BUDESONIDE AND FORMOTEROL FUMARATE DIHYDRATE 160; 4.5 UG/1; UG/1
AEROSOL RESPIRATORY (INHALATION)
Qty: 10.2 INHALER | Refills: 5 | Status: SHIPPED | OUTPATIENT
Start: 2021-03-05 | End: 2022-01-11

## 2021-04-08 ENCOUNTER — HOSPITAL ENCOUNTER (OUTPATIENT)
Dept: INFUSION THERAPY | Age: 61
Discharge: HOME | End: 2021-04-08
Payer: MEDICARE

## 2021-04-08 ENCOUNTER — OFFICE VISIT (OUTPATIENT)
Dept: ONCOLOGY | Age: 61
End: 2021-04-08
Payer: MEDICARE

## 2021-04-08 VITALS
OXYGEN SATURATION: 80 % | TEMPERATURE: 96.3 F | RESPIRATION RATE: 87 BRPM | HEIGHT: 70 IN | WEIGHT: 227 LBS | SYSTOLIC BLOOD PRESSURE: 149 MMHG | BODY MASS INDEX: 32.5 KG/M2 | HEART RATE: 87 BPM | DIASTOLIC BLOOD PRESSURE: 72 MMHG

## 2021-04-08 DIAGNOSIS — D75.1 SECONDARY POLYCYTHEMIA: ICD-10-CM

## 2021-04-08 DIAGNOSIS — F17.210 CONTINUOUS DEPENDENCE ON CIGARETTE SMOKING: Primary | ICD-10-CM

## 2021-04-08 DIAGNOSIS — F17.210 CONTINUOUS DEPENDENCE ON CIGARETTE SMOKING: ICD-10-CM

## 2021-04-08 LAB
ALBUMIN SERPL-MCNC: 3.6 GM/DL (ref 3.4–5)
ALP BLD-CCNC: 88 IU/L (ref 40–129)
ALT SERPL-CCNC: 17 U/L (ref 10–40)
ANION GAP SERPL CALCULATED.3IONS-SCNC: 6 MMOL/L (ref 4–16)
AST SERPL-CCNC: 17 IU/L (ref 15–37)
BASOPHILS ABSOLUTE: 0.1 K/CU MM
BASOPHILS RELATIVE PERCENT: 0.7 % (ref 0–1)
BILIRUB SERPL-MCNC: 0.5 MG/DL (ref 0–1)
BUN BLDV-MCNC: 14 MG/DL (ref 6–23)
CALCIUM SERPL-MCNC: 8.6 MG/DL (ref 8.3–10.6)
CHLORIDE BLD-SCNC: 91 MMOL/L (ref 99–110)
CO2: 35 MMOL/L (ref 21–32)
CREAT SERPL-MCNC: 0.7 MG/DL (ref 0.9–1.3)
DIFFERENTIAL TYPE: ABNORMAL
EOSINOPHILS ABSOLUTE: 0.4 K/CU MM
EOSINOPHILS RELATIVE PERCENT: 3.6 % (ref 0–3)
ERYTHROCYTE SEDIMENTATION RATE: 0 MM/HR (ref 0–20)
GFR AFRICAN AMERICAN: >60 ML/MIN/1.73M2
GFR NON-AFRICAN AMERICAN: >60 ML/MIN/1.73M2
GLUCOSE BLD-MCNC: 343 MG/DL (ref 70–99)
HCT VFR BLD CALC: 49 % (ref 42–52)
HEMOGLOBIN: 16.9 GM/DL (ref 13.5–18)
LACTATE DEHYDROGENASE: 172 IU/L (ref 120–246)
LYMPHOCYTES ABSOLUTE: 1.8 K/CU MM
LYMPHOCYTES RELATIVE PERCENT: 17.7 % (ref 24–44)
MCH RBC QN AUTO: 27.8 PG (ref 27–31)
MCHC RBC AUTO-ENTMCNC: 34.5 % (ref 32–36)
MCV RBC AUTO: 80.7 FL (ref 78–100)
MONOCYTES ABSOLUTE: 1.5 K/CU MM
MONOCYTES RELATIVE PERCENT: 14.4 % (ref 0–4)
PDW BLD-RTO: 15.9 % (ref 11.7–14.9)
PLATELET # BLD: 194 K/CU MM (ref 140–440)
PMV BLD AUTO: 9.7 FL (ref 7.5–11.1)
POTASSIUM SERPL-SCNC: 4.2 MMOL/L (ref 3.5–5.1)
RBC # BLD: 6.07 M/CU MM (ref 4.6–6.2)
SEGMENTED NEUTROPHILS ABSOLUTE COUNT: 6.4 K/CU MM
SEGMENTED NEUTROPHILS RELATIVE PERCENT: 63.6 % (ref 36–66)
SODIUM BLD-SCNC: 132 MMOL/L (ref 135–145)
TOTAL PROTEIN: 6.5 GM/DL (ref 6.4–8.2)
WBC # BLD: 10.1 K/CU MM (ref 4–10.5)

## 2021-04-08 PROCEDURE — G8417 CALC BMI ABV UP PARAM F/U: HCPCS | Performed by: INTERNAL MEDICINE

## 2021-04-08 PROCEDURE — 83615 LACTATE (LD) (LDH) ENZYME: CPT

## 2021-04-08 PROCEDURE — 80053 COMPREHEN METABOLIC PANEL: CPT

## 2021-04-08 PROCEDURE — 85652 RBC SED RATE AUTOMATED: CPT

## 2021-04-08 PROCEDURE — 99213 OFFICE O/P EST LOW 20 MIN: CPT | Performed by: INTERNAL MEDICINE

## 2021-04-08 PROCEDURE — 85025 COMPLETE CBC W/AUTO DIFF WBC: CPT

## 2021-04-08 PROCEDURE — 3017F COLORECTAL CA SCREEN DOC REV: CPT | Performed by: INTERNAL MEDICINE

## 2021-04-08 PROCEDURE — G8427 DOCREV CUR MEDS BY ELIG CLIN: HCPCS | Performed by: INTERNAL MEDICINE

## 2021-04-08 PROCEDURE — 36415 COLL VENOUS BLD VENIPUNCTURE: CPT

## 2021-04-08 PROCEDURE — 99211 OFF/OP EST MAY X REQ PHY/QHP: CPT

## 2021-04-08 PROCEDURE — 1036F TOBACCO NON-USER: CPT | Performed by: INTERNAL MEDICINE

## 2021-04-08 NOTE — PROGRESS NOTES
Patient Name: Roland Orellana  Patient : 1960  Patient MRN: [id number]     Primary Oncologist: Faiza Kennedy MD  Referring Provider: No primary care provider on file. Date of Service: 2021      Chief Complaint:   Chief Complaint   Patient presents with    Follow-up     Patient Active Problem List:     Shortness of breath     Continuous dependence on cigarette smoking     Secondary polycythemia    HPI:   Mr. Luna Ferreira is a 81-year-old very pleasant gentleman with medical history significant for hypertension, hyperlipidemia, diabetes mellitus, COPD, osteoarthritis, obesity, initially referred to me on December 3, 2018 for evaluation of erythrocytosis. Mr. Luna Ferreira was found to have erythrocytosis since . He recently established care with primary care physician, Dr. Catrachito Maxwell on 2018. Routine blood tests done on 2018 showed persistent erythrocytosis. He is a chronic smoker and he started smoking since he was 16. He used to smoke ~ 2 PPD before and he is currently smoking about 10 - 15 cigarettes per day. Since he is found to have persistent erythrocytosis, he was subsequently referred to me for further evaluation. He denied high altitude living. He does not have any family history of erythrocytosis. Laboratory work ups done on 12/3/18 showed normal erythropoietin level, normal ESR, LDH, iron study. JAK2 V617F mutation was negative. Abdominal sonogram done on 18 showed no splenomegaly or splenic mass. Limited evaluation liver, though grossly unremarkable. Focal area of gallbladder wall thickening measuring 5.9 mm, though limited with regards to scanning technique per the technologist. The remainder the gallbladder appears unremarkable. Likely clinically insignificant. If clinically concerned, evaluation for cystic duct obstruction could be performed with nuclear medicine imaging.     Low dose screening CT scan of chest done on 3/4/19 showed 0.4 cm solid nodule in the right lower lobe. Sequelae of granulomatous disease. Minimal coronary atherosclerotic calcifications. Continue annual lung screening with LDCT in 12 months. (probability of malignancy <1%). CT lung screening on May 27, 2020 showed stable appearance of chronic bronchitis with nonobstructive right mainstream bronchus dependent secretions. Stable chronic right hemidiaphragm elevation resulting in chronic lung base atelectasis. No acute thoracic abnormality or findings concerning for malignancy. On April 8, 2021, he presented to me for follow up. 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 He stated that his symptoms has improved some with phlebotomy. He has been under observation since then. Since he is due for screening CT chest, I will schedule for it as soon as possible and I will follow-up with the results. I recognized that his hematocrit today was 49% and I recommend for observation. He doesn't need phlebotomy this time. If his symptoms improve with phlebotomy, I will recommend to keep his hematocrit less than 50%. He stopped smoking since 1/1/2021. Otherwise, I will see him back in 4 months. He doesn't have any significant symptoms at today visit. Past Medical History  Significant for  1. Hypertension  2. Hyperlipidemia  3. Diabetes mellitus4. COPD  5. Osteoarthritis  6. Obesity    Surgical History  Significant for  1. Cyst removal from his tailbone area. Allergies  No known medication allergies    Social History  He is a former smoker and he quit smoking on 11/2020. He started smoking since he was 14, until 11/2020. He used to smoke 2 pack/day. He denies alcohol drinking and illicit drug abuse. He is currently living in [facility]. Family History  Grandmother - Maternal (2nd Degree): Breast cancer    Oncology History    No history exists. Review of Systems:   \"Per interval history; otherwise 10 point ROS is negative. \"  His energy level is pretty good, appetite and sleep are fine. He denies fever, chills, night sweats, cough, shortness of breath, chest pain, hemoptysis, or palpitations.  His bowel and bladder functions are normal. He denies nausea, vomiting, diarrhea, constipation, dysuria, loss of appetite, or weight loss. He doesn't have neuropathy and he denies bleeding or clotting issues. He denies any pain on today visit. No anxiety or depression. The rest of the systems are unremarkable. Vital Signs:  BP (!) 149/72 (Site: Right Upper Arm, Position: Sitting, Cuff Size: Medium Adult)   Pulse 87   Temp 96.3 °F (35.7 °C) (Temporal)   Resp (!) 87   Ht 5' 10\" (1.778 m)   Wt 227 lb (103 kg)   SpO2 (!) 80%   BMI 32.57 kg/m²     Physical Exam:  CONSTITUTIONAL: awake, no apparent distress, alert, cooperative,   EYES: pupils equal, round and reactive to light, sclera clear and conjunctiva normal  ENT: Normocephalic, without obvious abnormality, atraumatic  NECK: supple, symmetrical, no jugular venous distension and no carotid bruits   HEMATOLOGIC/LYMPHATIC: no cervical, supraclavicular or axillary lymphadenopathy   LUNGS: VBS, no wheezes, no increased work of breathing, no crackles, no rhonchi, clear to auscultation   CARDIOVASCULAR: regular rate and rhythm, normal S1 and S2, no murmur noted  ABDOMEN: normal bowel sounds x 4, non-tender, no masses palpated, no hepatosplenomegaly, soft, non-distended,    MUSCULOSKELETAL: full range of motion noted, tone is normal  NEUROLOGIC: awake, alert, oriented to name, place and time. Motor skills grossly intact. SKIN: Normal skin color, texture, turgor and no jaundice.  appears intact   EXTREMITIES: no leg swelling, no cyanosis, no LE edema, no clubbing     Labs:  Hematology:  Lab Results   Component Value Date    WBC 10.1 04/08/2021    RBC 6.07 04/08/2021    HGB 16.9 04/08/2021    HCT 49.0 04/08/2021    MCV 80.7 04/08/2021    MCH 27.8 04/08/2021 work ups were negative and I believe his erythrocytosis is due to hypoxemia from chronic cigarette smoking. CT lung screening on May 27, 2020 showed stable appearance of chronic bronchitis with nonobstructive right mainstream bronchus dependent secretions. Stable chronic right hemidiaphragm elevation resulting in chronic lung base atelectasis. No acute thoracic abnormality or findings concerning for malignancy. On April 8, 2021, he presented to me for follow up. 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 He stated that his symptoms has improved some with phlebotomy. He has been under observation since then. Since he is due for screening CT chest, I will schedule for it as soon as possible and I will follow-up with the results. I recognized that his hematocrit today was 49% and I recommend for observation. He doesn't need phlebotomy this time. If his symptoms improve with phlebotomy, I will recommend to keep his hematocrit less than 50%. He stopped smoking since 1/1/2021. Otherwise, I will see him back in 4 months. I answered all his questions and concerns for today. I asked him to follow-up with primary care physician on regular basis. Recent imaging and labs were reviewed and discussed with the patient.

## 2021-05-28 ENCOUNTER — HOSPITAL ENCOUNTER (OUTPATIENT)
Dept: CT IMAGING | Age: 61
Discharge: HOME | End: 2021-05-28
Payer: MEDICARE

## 2021-05-28 DIAGNOSIS — F17.210 CONTINUOUS DEPENDENCE ON CIGARETTE SMOKING: ICD-10-CM

## 2021-05-28 PROCEDURE — 71271 CT THORAX LUNG CANCER SCR C-: CPT

## 2021-05-31 NOTE — PROGRESS NOTES
Patient Name: Emily Tadeo  Patient : 1960  Patient MRN: [id number]     Primary Oncologist: Alexandrea Huynh MD  Referring Provider: No primary care provider on file. Date of Service: 2021      Chief Complaint:   Chief Complaint   Patient presents with    Follow-up    Results     CT scans     Patient Active Problem List:     Shortness of breath     Continuous dependence on cigarette smoking     Secondary polycythemia    HPI:   Mr. Dimas Pascual is a 72-year-old very pleasant gentleman with medical history significant for hypertension, hyperlipidemia, diabetes mellitus, COPD, osteoarthritis, obesity, initially referred to me on December 3, 2018 for evaluation of erythrocytosis. Mr. Dimas Pascual was found to have erythrocytosis since . He recently established care with primary care physician, Dr. Chelsy Hoep on 2018. Routine blood tests done on 2018 showed persistent erythrocytosis. He is a chronic smoker and he started smoking since he was 16. He used to smoke ~ 2 PPD before and he is currently smoking about 10 - 15 cigarettes per day. Since he is found to have persistent erythrocytosis, he was subsequently referred to me for further evaluation. He denied high altitude living. He does not have any family history of erythrocytosis. Laboratory work ups done on 12/3/18 showed normal erythropoietin level, normal ESR, LDH, iron study. JAK2 V617F mutation was negative. Abdominal sonogram done on 18 showed no splenomegaly or splenic mass. Limited evaluation liver, though grossly unremarkable. Focal area of gallbladder wall thickening measuring 5.9 mm, though limited with regards to scanning technique per the technologist. The remainder the gallbladder appears unremarkable. Likely clinically insignificant. If clinically concerned, evaluation for cystic duct obstruction could be performed with nuclear medicine imaging.     Low dose screening CT scan of chest done on 3/4/19 showed 0.4 cm solid nodule in the right lower lobe. Sequelae of granulomatous disease. Minimal coronary atherosclerotic calcifications. Continue annual lung screening with LDCT in 12 months. (probability of malignancy <1%). CT lung screening on May 27, 2020 showed stable appearance of chronic bronchitis with nonobstructive right mainstream bronchus dependent secretions. Stable chronic right hemidiaphragm elevation resulting in chronic lung base atelectasis. No acute thoracic abnormality or findings concerning for malignancy. CT lung screening done on May 28, 2021 showed a stable exam.  No acute cardiopulmonary findings or new suspicious pulmonary nodules. Dilated main pulmonary artery which can be seen with pulmonary hypertension. On June 1, 2021, he presented to me for follow up. 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 He stated that his symptoms has improved some with phlebotomy. He has been under observation since then. On today visit, I reviewed with him findings on CT lung screening done on May 28, 2021. There is no sign of malignant process noticed on CT scan and I recommended to have repeat CT lung screening and May 2022. I recognized that his hematocrit today was 50.1% and I recommend for observation. He doesn't need phlebotomy this time. If his symptoms improve with phlebotomy, I will recommend to keep his hematocrit less than 52%. He stopped smoking since 1/1/2021. Otherwise, I will see him back in 6 months. He doesn't have any significant symptoms at today visit. Past Medical History  Significant for  1. Hypertension  2. Hyperlipidemia  3. Diabetes mellitus4. COPD  5. Osteoarthritis  6. Obesity    Surgical History  Significant for  1. Cyst removal from his tailbone area. Allergies  No known medication allergies    Social History  He is a former smoker and he quit smoking on 11/2020.  He started smoking since he was 14, until 11/2020. He used to smoke 2 pack/day. He denies alcohol drinking and illicit drug abuse. He is currently living in [place]. Family History  Grandmother - Maternal (2nd Degree): Breast cancer    Oncology History    No history exists. Review of Systems: \"Per interval history; otherwise 10 point ROS is negative. \"  His energy level is stable, appetite and sleep are good. He doesn't have fever, chills, night sweats, cough, shortness of breath, chest pain, hemoptysis, or palpitations.  His bowel and bladder functions are normal. He doesn't have nausea, vomiting, diarrhea, constipation, dysuria, loss of appetite, or weight loss. He denies neuropathy and he doesn't have bleeding or clotting issues. He doesn't have any pain on today visit. Denies anxiety or depression. The rest of the systems are unremarkable. Vital Signs:  /77 (Site: Right Upper Arm, Position: Sitting, Cuff Size: Medium Adult)   Pulse 100   Temp 97.1 °F (36.2 °C) (Infrared)   Ht 5' 10\" (1.778 m)   Wt 224 lb (101.6 kg)   SpO2 (!) 80% Comment: denies SOB, wears O2 at home  BMI 32.14 kg/m²     Physical Exam:  CONSTITUTIONAL: awake, no apparent distress, alert, cooperative,   EYES: pupils equal, round and reactive to light, sclera clear and conjunctiva normal  ENT: Normocephalic, without obvious abnormality, atraumatic  NECK: supple, symmetrical, no jugular venous distension and no carotid bruits   HEMATOLOGIC/LYMPHATIC: no cervical, supraclavicular or axillary lymphadenopathy   LUNGS: VBS, no wheezes, no increased work of breathing, no crackles, clear to auscultation, no rhonchi,    CARDIOVASCULAR: regular rate and rhythm, normal S1 and S2, no murmur noted  ABDOMEN: normal bowel sounds x 4, non-tender, no masses palpated, no hepatosplenomegaly, soft, non-distended,    MUSCULOSKELETAL: full range of motion noted, tone is normal  NEUROLOGIC: awake, alert, oriented to name, place and time.  Motor skills grossly intact. SKIN: Normal skin color, texture, turgor and no jaundice.  appears intact   EXTREMITIES: no cyanosis, no LE edema, no leg swelling, no clubbing     Labs:  Hematology:  Lab Results   Component Value Date    WBC 8.7 06/01/2021    RBC 6.00 06/01/2021    HGB 16.7 06/01/2021    HCT 50.1 06/01/2021    MCV 83.5 06/01/2021    MCH 27.8 06/01/2021    MCHC 33.3 06/01/2021    RDW 16.4 (H) 06/01/2021     06/01/2021    MPV 9.5 06/01/2021    SEGSPCT 68.5 (H) 06/01/2021    EOSRELPCT 2.7 06/01/2021    BASOPCT 0.8 06/01/2021    LYMPHOPCT 15.5 (L) 06/01/2021    MONOPCT 12.5 (H) 06/01/2021    SEGSABS 6.0 06/01/2021    EOSABS 0.2 06/01/2021    BASOSABS 0.1 06/01/2021    LYMPHSABS 1.4 06/01/2021    MONOSABS 1.1 06/01/2021    DIFFTYPE AUTOMATED DIFFERENTIAL 06/01/2021     Lab Results   Component Value Date    ESR 2 06/01/2021     Chemistry:  Lab Results   Component Value Date     (L) 06/01/2021    K 4.8 06/01/2021    CL 91 (L) 06/01/2021    CO2 37 (H) 06/01/2021    BUN 8 06/01/2021    CREATININE 0.6 (L) 06/01/2021    GLUCOSE 315 (H) 06/01/2021    CALCIUM 8.8 06/01/2021    PROT 6.5 06/01/2021    LABALBU 4.2 06/01/2021    BILITOT 0.5 06/01/2021    ALKPHOS 82 06/01/2021    AST 13 (L) 06/01/2021    ALT 11 06/01/2021    LABGLOM >60 06/01/2021    GFRAA >60 06/01/2021    MG 2.1 07/03/2018    POCGLU 173 (H) 07/06/2018     Lab Results   Component Value Date     06/01/2021     No components found for: LD  Lab Results   Component Value Date    TSHHS 0.749 07/03/2018     Immunology:  Lab Results   Component Value Date    PROT 6.5 06/01/2021     No results found for: MAYA Rodriguez  No results found for: B2M  Coagulation Panel:  No results found for: PROTIME, INR, APTT, DDIMER  Anemia Panel:  No results found for: [id number], FOLATE  Tumor Markers:  No results found for: , CEA, , LABCA2, PSA  Observations:  PHQ-9 Total Score: 0 (6/1/2021  3:40 PM)        Assessment & Plan: Erythrocytosis    PLAN  Mr. Dimas Pascual is a 81-year-old very pleasant patient who was found to have persistent erythrocytosis on routine blood tests done on November 8, 2018. Laboratory work ups done on 12/3/18 showed normal erythropoietin level, normal ESR, LDH, iron study. JAK2 V617F mutation was negative. Abdominal sonogram is also negative for erythropoietin producing tumor. Since all the work ups were negative and I believe his erythrocytosis is due to hypoxemia from chronic cigarette smoking. CT lung screening on May 27, 2020 showed stable appearance of chronic bronchitis with nonobstructive right mainstream bronchus dependent secretions. Stable chronic right hemidiaphragm elevation resulting in chronic lung base atelectasis. No acute thoracic abnormality or findings concerning for malignancy. CT lung screening done on May 28, 2021 showed a stable exam.  No acute cardiopulmonary findings or new suspicious pulmonary nodules. Dilated main pulmonary artery which can be seen with pulmonary hypertension. On June 1, 2021, he presented to me for follow up. 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 He stated that his symptoms has improved some with phlebotomy. He has been under observation since then. On today visit, I reviewed with him findings on CT lung screening done on May 28, 2021. There is no sign of malignant process noticed on CT scan and I recommended to have repeat CT lung screening and May 2022. I recognized that his hematocrit today was 50.1% and I recommend for observation. He doesn't need phlebotomy this time. If his symptoms improve with phlebotomy, I will recommend to keep his hematocrit less than 52%. He stopped smoking since 1/1/2021. Otherwise, I will see him back in 6 months. I answered all his questions and concerns for today.  I asked him to follow-up with primary care physician on regular basis. Recent imaging and labs were reviewed and discussed with the patient.

## 2021-06-01 ENCOUNTER — HOSPITAL ENCOUNTER (OUTPATIENT)
Dept: INFUSION THERAPY | Age: 61
Discharge: HOME | End: 2021-06-01
Payer: MEDICARE

## 2021-06-01 ENCOUNTER — OFFICE VISIT (OUTPATIENT)
Dept: ONCOLOGY | Age: 61
End: 2021-06-01
Payer: MEDICARE

## 2021-06-01 VITALS
TEMPERATURE: 97.1 F | WEIGHT: 224 LBS | OXYGEN SATURATION: 80 % | DIASTOLIC BLOOD PRESSURE: 77 MMHG | HEIGHT: 70 IN | SYSTOLIC BLOOD PRESSURE: 136 MMHG | HEART RATE: 100 BPM | BODY MASS INDEX: 32.07 KG/M2

## 2021-06-01 DIAGNOSIS — D75.1 SECONDARY POLYCYTHEMIA: ICD-10-CM

## 2021-06-01 DIAGNOSIS — D75.1 SECONDARY POLYCYTHEMIA: Primary | ICD-10-CM

## 2021-06-01 LAB
ALBUMIN SERPL-MCNC: 4.2 GM/DL (ref 3.4–5)
ALP BLD-CCNC: 82 IU/L (ref 40–129)
ALT SERPL-CCNC: 11 U/L (ref 10–40)
ANION GAP SERPL CALCULATED.3IONS-SCNC: 6 MMOL/L (ref 4–16)
AST SERPL-CCNC: 13 IU/L (ref 15–37)
BASOPHILS ABSOLUTE: 0.1 K/CU MM
BASOPHILS RELATIVE PERCENT: 0.8 % (ref 0–1)
BILIRUB SERPL-MCNC: 0.5 MG/DL (ref 0–1)
BUN BLDV-MCNC: 8 MG/DL (ref 6–23)
CALCIUM SERPL-MCNC: 8.8 MG/DL (ref 8.3–10.6)
CHLORIDE BLD-SCNC: 91 MMOL/L (ref 99–110)
CO2: 37 MMOL/L (ref 21–32)
CREAT SERPL-MCNC: 0.6 MG/DL (ref 0.9–1.3)
DIFFERENTIAL TYPE: ABNORMAL
EOSINOPHILS ABSOLUTE: 0.2 K/CU MM
EOSINOPHILS RELATIVE PERCENT: 2.7 % (ref 0–3)
ERYTHROCYTE SEDIMENTATION RATE: 2 MM/HR (ref 0–20)
GFR AFRICAN AMERICAN: >60 ML/MIN/1.73M2
GFR NON-AFRICAN AMERICAN: >60 ML/MIN/1.73M2
GLUCOSE BLD-MCNC: 315 MG/DL (ref 70–99)
HCT VFR BLD CALC: 50.1 % (ref 42–52)
HEMOGLOBIN: 16.7 GM/DL (ref 13.5–18)
LACTATE DEHYDROGENASE: 187 IU/L (ref 120–246)
LYMPHOCYTES ABSOLUTE: 1.4 K/CU MM
LYMPHOCYTES RELATIVE PERCENT: 15.5 % (ref 24–44)
MCH RBC QN AUTO: 27.8 PG (ref 27–31)
MCHC RBC AUTO-ENTMCNC: 33.3 % (ref 32–36)
MCV RBC AUTO: 83.5 FL (ref 78–100)
MONOCYTES ABSOLUTE: 1.1 K/CU MM
MONOCYTES RELATIVE PERCENT: 12.5 % (ref 0–4)
PDW BLD-RTO: 16.4 % (ref 11.7–14.9)
PLATELET # BLD: 197 K/CU MM (ref 140–440)
PMV BLD AUTO: 9.5 FL (ref 7.5–11.1)
POTASSIUM SERPL-SCNC: 4.8 MMOL/L (ref 3.5–5.1)
RBC # BLD: 6 M/CU MM (ref 4.6–6.2)
SEGMENTED NEUTROPHILS ABSOLUTE COUNT: 6 K/CU MM
SEGMENTED NEUTROPHILS RELATIVE PERCENT: 68.5 % (ref 36–66)
SODIUM BLD-SCNC: 134 MMOL/L (ref 135–145)
TOTAL PROTEIN: 6.5 GM/DL (ref 6.4–8.2)
WBC # BLD: 8.7 K/CU MM (ref 4–10.5)

## 2021-06-01 PROCEDURE — G8417 CALC BMI ABV UP PARAM F/U: HCPCS | Performed by: INTERNAL MEDICINE

## 2021-06-01 PROCEDURE — 36415 COLL VENOUS BLD VENIPUNCTURE: CPT

## 2021-06-01 PROCEDURE — 99211 OFF/OP EST MAY X REQ PHY/QHP: CPT

## 2021-06-01 PROCEDURE — 85652 RBC SED RATE AUTOMATED: CPT

## 2021-06-01 PROCEDURE — G8427 DOCREV CUR MEDS BY ELIG CLIN: HCPCS | Performed by: INTERNAL MEDICINE

## 2021-06-01 PROCEDURE — 80053 COMPREHEN METABOLIC PANEL: CPT

## 2021-06-01 PROCEDURE — 99213 OFFICE O/P EST LOW 20 MIN: CPT | Performed by: INTERNAL MEDICINE

## 2021-06-01 PROCEDURE — 83615 LACTATE (LD) (LDH) ENZYME: CPT

## 2021-06-01 PROCEDURE — 3017F COLORECTAL CA SCREEN DOC REV: CPT | Performed by: INTERNAL MEDICINE

## 2021-06-01 PROCEDURE — 85025 COMPLETE CBC W/AUTO DIFF WBC: CPT

## 2021-06-01 PROCEDURE — 1036F TOBACCO NON-USER: CPT | Performed by: INTERNAL MEDICINE

## 2021-06-01 ASSESSMENT — PATIENT HEALTH QUESTIONNAIRE - PHQ9
2. FEELING DOWN, DEPRESSED OR HOPELESS: 0
SUM OF ALL RESPONSES TO PHQ QUESTIONS 1-9: 0
SUM OF ALL RESPONSES TO PHQ QUESTIONS 1-9: 0
1. LITTLE INTEREST OR PLEASURE IN DOING THINGS: 0
SUM OF ALL RESPONSES TO PHQ QUESTIONS 1-9: 0
SUM OF ALL RESPONSES TO PHQ9 QUESTIONS 1 & 2: 0

## 2021-06-01 NOTE — PROGRESS NOTES
MA Rooming Questions  Patient: Reyna Watts  MRN: [id number]    Date: 6/1/2021        1. Do you have any new issues?   no         2. Do you need any refills on medications?    no    3. Have you had any imaging done since your last visit? yes - ct SCAN    4. Have you been hospitalized or seen in the emergency room since your last visit here?   no    5. Did the patient have a depression screening completed today?  Yes    PHQ-9 Total Score: 0 (6/1/2021  3:40 PM)       PHQ-9 Given to (if applicable):               PHQ-9 Score (if applicable):                     [] Positive     [x]  Negative              Does question #9 need addressed (if applicable)                     [] Yes    []  No               Melba Singer CMA

## 2021-06-22 ENCOUNTER — HOSPITAL ENCOUNTER (OUTPATIENT)
Dept: PULMONOLOGY | Age: 61
Discharge: HOME | End: 2021-06-22
Payer: MEDICARE

## 2021-06-22 PROCEDURE — 94618 PULMONARY STRESS TESTING: CPT

## 2021-06-22 NOTE — PROGRESS NOTES
6/22/2021 3:08 PM  Patient Room #: Room/bed info not found  Patient Name: Alexis Guardado    (Step 1 Done by RN if possible otherwise call Pulmonary Diagnostics)  1. Place patient on room air at rest for at least 30 minutes. If patient falls below 88% before 30 minutes then you can record the level and stop. Record room air saturation level _85_ %. If patient is at 88% or below, they will qualify for home oxygen and you can stop. If level does not fall below 88%, fill in level above. If indicated continue to Step 2. Signature:_Nimesh Rabago RRT____ Date: _06/22/2021__  (Step 2&3 Done by P)  2. Ambulate patient on room air until saturation falls below 89%. Record level of room air saturation with ambulation___ %. Next, place patient back on ___lpm oxygen and ambulate, record level __%. (Note:  this level must show improvement from room air level done with ambulation.)  If patients saturation on room air with ambulation is 88% or below AND patient shows improvement with oxygen during ambulation, they will qualify for home oxygen and you can stop. If patient does not drop below 89%, then patient should have an overnight oximetry trending on room air to see if level falls below 88%. Complete level in Step 3 below. 3. Room air overnight oximetry level 88 % for___  cumulative minutes. If patients room air oxygen level is < 89% for at least 5 cumulative minutes, patient will qualify for home oxygen and you can stop. (Attach Night Trending Report)    Complete order below: Diagnosis:____COPD  Home oxygen at:  Length of Need: ?X Lifetime ?  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

## 2021-08-02 ENCOUNTER — TELEPHONE (OUTPATIENT)
Dept: PULMONOLOGY | Age: 61
End: 2021-08-02

## 2021-08-02 ENCOUNTER — OFFICE VISIT (OUTPATIENT)
Dept: FAMILY MEDICINE CLINIC | Age: 61
End: 2021-08-02
Payer: MEDICARE

## 2021-08-02 VITALS
OXYGEN SATURATION: 91 % | HEART RATE: 103 BPM | DIASTOLIC BLOOD PRESSURE: 82 MMHG | WEIGHT: 223.2 LBS | BODY MASS INDEX: 32.03 KG/M2 | SYSTOLIC BLOOD PRESSURE: 125 MMHG

## 2021-08-02 DIAGNOSIS — J44.9 CHRONIC OBSTRUCTIVE PULMONARY DISEASE, UNSPECIFIED COPD TYPE (HCC): ICD-10-CM

## 2021-08-02 DIAGNOSIS — N50.89 TESTICULAR SWELLING, LEFT: ICD-10-CM

## 2021-08-02 DIAGNOSIS — B37.42 CANDIDIASIS OF PENIS: ICD-10-CM

## 2021-08-02 DIAGNOSIS — E11.65 UNCONTROLLED TYPE 2 DIABETES MELLITUS WITH HYPERGLYCEMIA (HCC): Primary | ICD-10-CM

## 2021-08-02 LAB — HBA1C MFR BLD: 12.8 %

## 2021-08-02 PROCEDURE — 2022F DILAT RTA XM EVC RTNOPTHY: CPT | Performed by: FAMILY MEDICINE

## 2021-08-02 PROCEDURE — 1036F TOBACCO NON-USER: CPT | Performed by: FAMILY MEDICINE

## 2021-08-02 PROCEDURE — G8427 DOCREV CUR MEDS BY ELIG CLIN: HCPCS | Performed by: FAMILY MEDICINE

## 2021-08-02 PROCEDURE — 3017F COLORECTAL CA SCREEN DOC REV: CPT | Performed by: FAMILY MEDICINE

## 2021-08-02 PROCEDURE — 3046F HEMOGLOBIN A1C LEVEL >9.0%: CPT | Performed by: FAMILY MEDICINE

## 2021-08-02 PROCEDURE — 3023F SPIROM DOC REV: CPT | Performed by: FAMILY MEDICINE

## 2021-08-02 PROCEDURE — 83036 HEMOGLOBIN GLYCOSYLATED A1C: CPT | Performed by: FAMILY MEDICINE

## 2021-08-02 PROCEDURE — 99204 OFFICE O/P NEW MOD 45 MIN: CPT | Performed by: FAMILY MEDICINE

## 2021-08-02 PROCEDURE — G8926 SPIRO NO PERF OR DOC: HCPCS | Performed by: FAMILY MEDICINE

## 2021-08-02 PROCEDURE — G8417 CALC BMI ABV UP PARAM F/U: HCPCS | Performed by: FAMILY MEDICINE

## 2021-08-02 RX ORDER — GLUCOSAMINE HCL/CHONDROITIN SU 500-400 MG
CAPSULE ORAL
Qty: 100 STRIP | Refills: 0 | Status: SHIPPED | OUTPATIENT
Start: 2021-08-02 | End: 2022-01-17 | Stop reason: SDUPTHER

## 2021-08-02 RX ORDER — NYSTATIN 100000 U/G
CREAM TOPICAL
Qty: 30 G | Refills: 0 | Status: SHIPPED | OUTPATIENT
Start: 2021-08-02 | End: 2022-01-11

## 2021-08-02 RX ORDER — METFORMIN HYDROCHLORIDE 500 MG/1
1000 TABLET, EXTENDED RELEASE ORAL 2 TIMES DAILY
Qty: 120 TABLET | Refills: 5 | Status: SHIPPED | OUTPATIENT
Start: 2021-08-02 | End: 2021-08-27

## 2021-08-02 RX ORDER — FLUCONAZOLE 100 MG/1
100 TABLET ORAL DAILY
Qty: 3 TABLET | Refills: 0 | Status: SHIPPED | OUTPATIENT
Start: 2021-08-02 | End: 2021-08-05

## 2021-08-02 RX ORDER — METFORMIN HYDROCHLORIDE 500 MG/1
500 TABLET, EXTENDED RELEASE ORAL 2 TIMES DAILY
COMMUNITY
End: 2021-08-02 | Stop reason: SDUPTHER

## 2021-08-02 NOTE — PROGRESS NOTES
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 Stable.   Otherwise, good ocular health in each eye.  No evidence of diabetic or hypertensive retinal changes in either eye.  Hyperopia ("farsightedness") in each eye, greater in the left eye.  Note stable refractive error in the right eye, and need for stronger Rx in the left eye.   Good (and stable) best-corrected VA in each eye.   Presbyopia consistent with age.   New spectacle lens Rx issued.  Repeat general eye examination and refraction in one year.      " Grover Bojorquez  22    Chief Complaint   Patient presents with    New Patient     Patient here to establish care with PCP    Other     Patient states having an infection            62 yrs old man with PMH of DM, and COPD came today to establish with us, used to be on Metformin, but he is out for more than a year, because he Could not get the refill, patient also complaining of infection in his penis, itching, pain, no discharge, no fever, no abdominal pain, no nausea or vomiting. And this infection going on for a while, did see a urologist a year ago and put him on the Flomax, as he is out. He is not watching his diet for the diabetes. Past Medical History:   Diagnosis Date    Arthritis     Cataract     [facility]ch[facility] obstructive [facility] disease) [facility] [address]     Diabetes mellitus ([facility] [address]     Enlarged prostate     Hyperlipidemia     Hypertension      Past Surgical History:   Procedure Laterality Date    COLONOSCOPY      SKIN BIOPSY      TONSILLECTOMY       History reviewed. No pertinent family history.   Social History     Socioeconomic History    Marital status: Single     Spouse name: Not on file    Number of children: Not on file    Years of education: Not on file    Highest education level: Not on file   Occupational History    Not on file   Tobacco Use    Smoking status: Former Smoker     Packs/day: 0.50     Years: 47.00     Pack years: 23.50     Types: Cigarettes     Start date: 1973     Quit date: 2021     Years since quittin.5    Smokeless tobacco: Never Used   Substance and Sexual Activity    Alcohol use: No    Drug use: No    Sexual activity: Not on file     Comment: deferred   Other Topics Concern    Not on file   Social History Narrative    Not on file     Social Determinants of Health     Financial Resource Strain:     Difficulty of Paying Living Expenses:    Food Insecurity:     Worried About Running Out of Food in the Last Year:     Juancarlos of Food in the Last Year:    Transportation Needs:     Lack of Transportation (Medical):  Lack of Transportation (Non-Medical):    Physical Activity:     Days of Exercise per Week:     Minutes of Exercise per Session:    Stress:     Feeling of Stress :    Social Connections:     Frequency of Communication with Friends and Family:     Frequency of Social Gatherings with Friends and Family:     Attends [employer] Services:     Active Member of Clubs or Organizations:     Attends Club or Organization Meetings:     Marital Status:    Intimate Partner Violence:     Fear of Current or Ex-Partner:     Emotionally Abused:     Physically Abused:     Sexually Abused:        No Known Allergies  Current Outpatient Medications   Medication Sig Dispense Refill    albuterol (PROVENTIL) (5 MG/ML) 0.5% nebulizer solution Take 0.5 mLs by nebulization every 6 hours as needed for Wheezing 120 each 3    metFORMIN (GLUCOPHAGE-XR) 500 MG extended release tablet Take 2 tablets by mouth 2 times daily 120 tablet 5    blood glucose monitor strips Test 3 times a day & as needed for symptoms of irregular blood glucose. Please fill with what is coverd by insurance 100 strip 0    fluconazole (DIFLUCAN) 100 MG tablet Take 1 tablet by mouth daily for 3 days 3 tablet 0    nystatin (MYCOSTATIN) 315870 UNIT/GM cream Apply topically 2 times daily. To the affected area 30 g 0    SYMBICORT 160-4.5 MCG/ACT AERO TAKE 2 PUFFS BY MOUTH TWICE A DAY 10.2 Inhaler 5     No current facility-administered medications for this visit. Review of Systems   Constitutional: Negative for activity change, appetite change, chills, fatigue and fever. HENT: Negative for congestion, sinus pressure and sore throat. Respiratory: Negative for cough, chest tightness and shortness of breath. Cardiovascular: Negative for chest pain and leg swelling. Gastrointestinal: Negative for abdominal pain, constipation and diarrhea.    Genitourinary: Normocephalic and atraumatic. Eyes:      General: No scleral icterus. Pupils: Pupils are equal, round, and reactive to light. Cardiovascular:      Rate and Rhythm: Normal rate and regular rhythm. Heart sounds: Normal heart sounds. No murmur heard. Pulmonary:      Effort: Pulmonary effort is normal.      Breath sounds: Normal breath sounds. No wheezing. Abdominal:      General: There is no distension. Palpations: Abdomen is soft. There is no mass. Tenderness: There is no abdominal tenderness. Genitourinary:     Comments: White thick material cover all the penis with erythema base, and has left testicle swelling, with some tenderness  Musculoskeletal:         General: Normal range of motion. Cervical back: Normal range of motion and neck supple. No rigidity. Right lower leg: No edema. Left lower leg: No edema. Neurological:      General: No focal deficit present. Mental Status: He is alert and oriented to person, place, and time. Psychiatric:         Mood and Affect: Mood normal.         Behavior: Behavior normal.         Thought Content: Thought content normal.         ASSESSMENT/ PLAN:    1. Uncontrolled type 2 diabetes mellitus with hyperglycemia ([facility] [address]  - will try the metformin for 2 months, refuse the diabetic education  - metFORMIN (GLUCOPHAGE-XR) 500 MG extended release tablet; Take 2 tablets by mouth 2 times daily  Dispense: 120 tablet; Refill: 5  - blood glucose monitor strips; Test 3 times a day & as needed for symptoms of irregular blood glucose. Please fill with what is coverd by insurance  Dispense: 100 strip; Refill: 0  - POCT glycosylated hemoglobin (Hb A1C)    2. Chronic obstructive pulmonary disease, unspecified COPD type (HCC)  - stable  - albuterol (PROVENTIL) (5 MG/ML) 0.5% nebulizer solution; Take 0.5 mLs by nebulization every 6 hours as needed for Wheezing  Dispense: 120 each; Refill: 3    3.  Testicular swelling, left  - AFL - Leighann Buitrago, MD, Urology, [place]    4. Candidiasis of penis  - start:  - fluconazole (DIFLUCAN) 100 MG tablet; Take 1 tablet by mouth daily for 3 days  Dispense: 3 tablet; Refill: 0  - nystatin (MYCOSTATIN) 964151 UNIT/GM cream; Apply topically 2 times daily. To the affected area  Dispense: 30 g; Refill: 0              - All old blood work reviewed with the patient  - Appropriate prescription are addressed. - After visit summery provided. - Questions answered and patient verbalizes understanding.  - Call for any problem, questions, or concerns.  - RTC if symptoms worse. Return in about 2 months (around 10/2/2021).

## 2021-08-02 NOTE — TELEPHONE ENCOUNTER
Patient came into the office and advised he is now back from [place] and would like to have the test completed now he was needing.

## 2021-08-03 ENCOUNTER — TELEPHONE (OUTPATIENT)
Dept: FAMILY MEDICINE CLINIC | Age: 61
End: 2021-08-03

## 2021-08-03 PROBLEM — N50.89 TESTICULAR SWELLING, LEFT: Status: ACTIVE | Noted: 2021-08-03

## 2021-08-03 PROBLEM — E11.65 UNCONTROLLED TYPE 2 DIABETES MELLITUS WITH HYPERGLYCEMIA (HCC): Status: ACTIVE | Noted: 2021-08-03

## 2021-08-03 ASSESSMENT — ENCOUNTER SYMPTOMS
BACK PAIN: 0
CONSTIPATION: 0
CHEST TIGHTNESS: 0
ABDOMINAL PAIN: 0
SINUS PRESSURE: 0
COUGH: 0
SORE THROAT: 0
SHORTNESS OF BREATH: 0
DIARRHEA: 0

## 2021-08-05 ENCOUNTER — TELEPHONE (OUTPATIENT)
Dept: PULMONOLOGY | Age: 61
End: 2021-08-05

## 2021-08-05 DIAGNOSIS — Z01.818 PREOP TESTING: ICD-10-CM

## 2021-08-05 DIAGNOSIS — J44.9 CHRONIC OBSTRUCTIVE PULMONARY DISEASE, UNSPECIFIED COPD TYPE (HCC): Primary | ICD-10-CM

## 2021-08-05 NOTE — TELEPHONE ENCOUNTER
Liana from central scheduling needs a new order for a PFT and covid order so she can get him scheduled for that.   Please advise

## 2021-08-17 DIAGNOSIS — Z01.811 PREOP PULMONARY/RESPIRATORY EXAM: Primary | ICD-10-CM

## 2021-08-27 ENCOUNTER — HOSPITAL ENCOUNTER (OUTPATIENT)
Dept: LAB | Age: 61
Setting detail: SPECIMEN
Discharge: HOME | End: 2021-08-27
Payer: MEDICARE

## 2021-08-27 DIAGNOSIS — E11.65 UNCONTROLLED TYPE 2 DIABETES MELLITUS WITH HYPERGLYCEMIA (HCC): ICD-10-CM

## 2021-08-27 PROCEDURE — U0005 INFEC AGEN DETEC AMPLI PROBE: HCPCS

## 2021-08-27 PROCEDURE — U0003 INFECTIOUS AGENT DETECTION BY NUCLEIC ACID (DNA OR RNA); SEVERE ACUTE RESPIRATORY SYNDROME CORONAVIRUS 2 (SARS-COV-2) (CORONAVIRUS DISEASE [COVID-19]), AMPLIFIED PROBE TECHNIQUE, MAKING USE OF HIGH THROUGHPUT TECHNOLOGIES AS DESCRIBED BY CMS-2020-01-R: HCPCS

## 2021-08-27 RX ORDER — METFORMIN HYDROCHLORIDE 500 MG/1
TABLET, EXTENDED RELEASE ORAL
Qty: 120 TABLET | Refills: 0 | Status: SHIPPED | OUTPATIENT
Start: 2021-08-27 | End: 2021-09-27

## 2021-08-28 LAB
SARS-COV-2: NOT DETECTED
SOURCE: NORMAL

## 2021-09-01 ENCOUNTER — HOSPITAL ENCOUNTER (OUTPATIENT)
Dept: PULMONOLOGY | Age: 61
Discharge: HOME | End: 2021-09-01
Payer: MEDICARE

## 2021-09-01 DIAGNOSIS — J44.9 CHRONIC OBSTRUCTIVE PULMONARY DISEASE, UNSPECIFIED COPD TYPE (HCC): ICD-10-CM

## 2021-09-01 LAB
DLCO %PRED: 53 %
DLCO PRED: NORMAL
DLCO/VA %PRED: NORMAL
DLCO/VA PRED: NORMAL
DLCO/VA: NORMAL
DLCO: NORMAL
EXPIRATORY TIME-POST: NORMAL
EXPIRATORY TIME: NORMAL
FEF 25-75% %CHNG: NORMAL
FEF 25-75% %PRED-POST: NORMAL
FEF 25-75% %PRED-PRE: NORMAL
FEF 25-75% PRED: NORMAL
FEF 25-75%-POST: NORMAL
FEF 25-75%-PRE: NORMAL
FEV1 %PRED-POST: 36 %
FEV1 %PRED-PRE: 35 %
FEV1 PRED: NORMAL
FEV1-POST: NORMAL
FEV1-PRE: NORMAL
FEV1/FVC %PRED-POST: NORMAL
FEV1/FVC %PRED-PRE: NORMAL
FEV1/FVC PRED: NORMAL
FEV1/FVC-POST: 116 %
FEV1/FVC-PRE: 112 %
FVC %PRED-POST: NORMAL
FVC %PRED-PRE: NORMAL
FVC PRED: NORMAL
FVC-POST: NORMAL
FVC-PRE: NORMAL
GAW %PRED: NORMAL
GAW PRED: NORMAL
GAW: NORMAL
IC %PRED: NORMAL
IC PRED: NORMAL
IC: NORMAL
MEP: NORMAL
MIP: NORMAL
MVV %PRED-PRE: NORMAL
MVV PRED: NORMAL
MVV-PRE: NORMAL
PEF %PRED-POST: NORMAL
PEF %PRED-PRE: NORMAL
PEF PRED: NORMAL
PEF%CHNG: NORMAL
PEF-POST: NORMAL
PEF-PRE: NORMAL
RAW %PRED: NORMAL
RAW PRED: NORMAL
RAW: NORMAL
RV %PRED: NORMAL
RV PRED: NORMAL
RV: NORMAL
SVC %PRED: NORMAL
SVC PRED: NORMAL
SVC: NORMAL
TLC %PRED: 54 %
TLC PRED: NORMAL
TLC: NORMAL
VA %PRED: NORMAL
VA PRED: NORMAL
VA: NORMAL
VTG %PRED: NORMAL
VTG PRED: NORMAL
VTG: NORMAL

## 2021-09-01 PROCEDURE — 94060 EVALUATION OF WHEEZING: CPT

## 2021-09-01 PROCEDURE — 94727 GAS DIL/WSHOT DETER LNG VOL: CPT

## 2021-09-01 PROCEDURE — 94729 DIFFUSING CAPACITY: CPT

## 2021-09-01 ASSESSMENT — PULMONARY FUNCTION TESTS
FEV1/FVC_POST: 116
FEV1_PERCENT_PREDICTED_PRE: 35
FEV1/FVC_PRE: 112
FEV1_PERCENT_PREDICTED_POST: 36

## 2021-10-07 ENCOUNTER — OFFICE VISIT (OUTPATIENT)
Dept: FAMILY MEDICINE CLINIC | Age: 61
End: 2021-10-07
Payer: MEDICARE

## 2021-10-07 ENCOUNTER — HOSPITAL ENCOUNTER (OUTPATIENT)
Dept: ULTRASOUND IMAGING | Age: 61
Discharge: HOME | End: 2021-10-07
Payer: MEDICARE

## 2021-10-07 VITALS
HEART RATE: 79 BPM | WEIGHT: 217.2 LBS | SYSTOLIC BLOOD PRESSURE: 120 MMHG | DIASTOLIC BLOOD PRESSURE: 78 MMHG | BODY MASS INDEX: 31.16 KG/M2

## 2021-10-07 DIAGNOSIS — J44.9 CHRONIC OBSTRUCTIVE PULMONARY DISEASE, UNSPECIFIED COPD TYPE (HCC): ICD-10-CM

## 2021-10-07 DIAGNOSIS — N50.812 TESTICULAR PAIN, LEFT: ICD-10-CM

## 2021-10-07 DIAGNOSIS — E11.65 UNCONTROLLED TYPE 2 DIABETES MELLITUS WITH HYPERGLYCEMIA (HCC): Primary | ICD-10-CM

## 2021-10-07 DIAGNOSIS — Z23 NEEDS FLU SHOT: ICD-10-CM

## 2021-10-07 DIAGNOSIS — A63.0 CONDYLOMA ACUMINATUM: ICD-10-CM

## 2021-10-07 LAB — HBA1C MFR BLD: 11.1 %

## 2021-10-07 PROCEDURE — G8482 FLU IMMUNIZE ORDER/ADMIN: HCPCS | Performed by: FAMILY MEDICINE

## 2021-10-07 PROCEDURE — 90674 CCIIV4 VAC NO PRSV 0.5 ML IM: CPT | Performed by: FAMILY MEDICINE

## 2021-10-07 PROCEDURE — G8417 CALC BMI ABV UP PARAM F/U: HCPCS | Performed by: FAMILY MEDICINE

## 2021-10-07 PROCEDURE — 76870 US EXAM SCROTUM: CPT

## 2021-10-07 PROCEDURE — 3017F COLORECTAL CA SCREEN DOC REV: CPT | Performed by: FAMILY MEDICINE

## 2021-10-07 PROCEDURE — 3023F SPIROM DOC REV: CPT | Performed by: FAMILY MEDICINE

## 2021-10-07 PROCEDURE — 2022F DILAT RTA XM EVC RTNOPTHY: CPT | Performed by: FAMILY MEDICINE

## 2021-10-07 PROCEDURE — G8926 SPIRO NO PERF OR DOC: HCPCS | Performed by: FAMILY MEDICINE

## 2021-10-07 PROCEDURE — 1036F TOBACCO NON-USER: CPT | Performed by: FAMILY MEDICINE

## 2021-10-07 PROCEDURE — G0008 ADMIN INFLUENZA VIRUS VAC: HCPCS | Performed by: FAMILY MEDICINE

## 2021-10-07 PROCEDURE — 3046F HEMOGLOBIN A1C LEVEL >9.0%: CPT | Performed by: FAMILY MEDICINE

## 2021-10-07 PROCEDURE — 99214 OFFICE O/P EST MOD 30 MIN: CPT | Performed by: FAMILY MEDICINE

## 2021-10-07 PROCEDURE — 93975 VASCULAR STUDY: CPT

## 2021-10-07 PROCEDURE — G8427 DOCREV CUR MEDS BY ELIG CLIN: HCPCS | Performed by: FAMILY MEDICINE

## 2021-10-07 RX ORDER — GLIMEPIRIDE 1 MG/1
1 TABLET ORAL 2 TIMES DAILY
Qty: 60 TABLET | Refills: 3 | Status: SHIPPED | OUTPATIENT
Start: 2021-10-07 | End: 2022-01-25

## 2021-10-07 NOTE — PROGRESS NOTES
of Communication with Friends and Family:     Frequency of Social Gatherings with Friends and Family:     Attends [employer] Services:     Active Member of Clubs or Organizations:     Attends Club or Organization Meetings:     Marital Status:    Intimate Partner Violence:     Fear of Current or Ex-Partner:     Emotionally Abused:     Physically Abused:     Sexually Abused:        No Known Allergies  Current Outpatient Medications   Medication Sig Dispense Refill    glimepiride (AMARYL) 1 MG tablet Take 1 tablet by mouth 2 times daily 60 tablet 3    metFORMIN (GLUCOPHAGE-XR) 500 MG extended release tablet TAKE 2 TABLETS BY MOUTH TWICE A  tablet 5    albuterol (PROVENTIL) (5 MG/ML) 0.5% nebulizer solution Take 0.5 mLs by nebulization every 6 hours as needed for Wheezing 120 each 3    blood glucose monitor strips Test 3 times a day & as needed for symptoms of irregular blood glucose. Please fill with what is coverd by insurance 100 strip 0    nystatin (MYCOSTATIN) 021969 UNIT/GM cream Apply topically 2 times daily. To the affected area 30 g 0    SYMBICORT 160-4.5 MCG/ACT AERO TAKE 2 PUFFS BY MOUTH TWICE A DAY 10.2 Inhaler 5     No current facility-administered medications for this visit. Review of Systems   Constitutional: Negative for activity change, appetite change, chills, fatigue and fever. HENT: Negative for congestion. Respiratory: Negative for cough and shortness of breath. Cardiovascular: Negative for chest pain and leg swelling. Genitourinary: Positive for frequency, penile pain, scrotal swelling and testicular pain. Negative for discharge, dysuria and urgency. Musculoskeletal: Negative for back pain. Skin: Negative for rash. Neurological: Negative for dizziness and headaches. Psychiatric/Behavioral: Negative for agitation and behavioral problems. The patient is not nervous/anxious.         Lab Results   Component Value Date    WBC 8.7 06/01/2021    HGB 16.7 06/01/2021    HCT 50.1 06/01/2021    MCV 83.5 06/01/2021     06/01/2021     Lab Results   Component Value Date     (L) 06/01/2021    K 4.8 06/01/2021    CL 91 (L) 06/01/2021    CO2 37 (H) 06/01/2021    BUN 8 06/01/2021    CREATININE 0.6 (L) 06/01/2021    GLUCOSE 315 (H) 06/01/2021    CALCIUM 8.8 06/01/2021    PROT 6.5 06/01/2021    LABALBU 4.2 06/01/2021    BILITOT 0.5 06/01/2021    ALKPHOS 82 06/01/2021    AST 13 (L) 06/01/2021    ALT 11 06/01/2021    LABGLOM >60 06/01/2021    GFRAA >60 06/01/2021     Lab Results   Component Value Date    CHOL 110 07/03/2018     Lab Results   Component Value Date    TRIG 62 07/03/2018     Lab Results   Component Value Date    HDL 38 (L) 07/03/2018     No results found for: [facility], LDLCHOLESTEROL  Lab Results   Component Value Date    LABA1C 11.1 10/07/2021     Lab Results   Component Value Date    TSHHS 0.749 07/03/2018         /78 (Site: Left Upper Arm, Position: Sitting, Cuff Size: Medium Adult)   Pulse 79   Wt 217 lb 3.2 oz (98.5 kg)   PF 92 L/min   BMI 31.16 kg/m²     BP Readings from Last 3 Encounters:   10/07/21 120/78   08/02/21 125/82   06/01/21 136/77       Wt Readings from Last 3 Encounters:   10/07/21 217 lb 3.2 oz (98.5 kg)   08/02/21 223 lb 3.2 oz (101.2 kg)   06/01/21 224 lb (101.6 kg)         Physical Exam  Constitutional:       General: He is not in acute distress. Appearance: He is well-developed. He is not diaphoretic. HENT:      Head: Normocephalic and atraumatic. Cardiovascular:      Rate and Rhythm: Normal rate and regular rhythm. Heart sounds: Normal heart sounds. No murmur heard. Pulmonary:      Effort: Pulmonary effort is normal.      Breath sounds: Normal breath sounds. No wheezing. Musculoskeletal:         General: Normal range of motion. Cervical back: Normal range of motion and neck supple. Right lower leg: No edema. Left lower leg: No edema. Neurological:      General: No focal deficit present. Mental Status: He is alert and oriented to person, place, and time. Psychiatric:         Mood and Affect: Mood normal.         Behavior: Behavior normal.         ASSESSMENT/ PLAN:    1. Uncontrolled type 2 diabetes mellitus with hyperglycemia ([facility] [address]  - getting better, add:  - glimepiride (AMARYL) 1 MG tablet; Take 1 tablet by mouth 2 times daily  Dispense: 60 tablet; Refill: 3  - POCT glycosylated hemoglobin (Hb A1C)    2. Chronic obstructive pulmonary disease, unspecified COPD type (HCC)  - stable    3. Needs flu shot  - tolerate the shot  - INFLUENZA, MDCK QUADV, 2 YRS AND OLDER, IM, PF, PREFILL SYR OR SDV, 0.5ML (FLUCELVAX QUADV, PF)              - All old blood work reviewed with the patient  - Appropriate prescription are addressed. - After visit summery provided. - Questions answered and patient verbalizes understanding.  - Call for any problem, questions, or concerns. Return in about 3 months (around 1/7/2022) for medicare wellness.

## 2021-10-10 ASSESSMENT — ENCOUNTER SYMPTOMS
SHORTNESS OF BREATH: 0
COUGH: 0
BACK PAIN: 0

## 2022-01-04 NOTE — PROGRESS NOTES
Patient Name: Sergio Elkins  Patient : 1960  Patient MRN: [id number]     Primary Oncologist: Ngoc Carrillo MD  Referring Provider: Lalita Armenta MD     Date of Service: 2022      Chief Complaint:   Chief Complaint   Patient presents with    Follow-up     Patient Active Problem List:     Shortness of breath     Continuous dependence on cigarette smoking     Secondary polycythemia    HPI:   Mr. Royce Garduno is a 78-year-old very pleasant gentleman with medical history significant for hypertension, hyperlipidemia, diabetes mellitus, COPD, osteoarthritis, obesity, initially referred to me on December 3, 2018 for evaluation of erythrocytosis. Mr. Royce Garduno was found to have erythrocytosis since . He recently established care with primary care physician, Dr. Bindu Pimentel on 2018. Routine blood tests done on 2018 showed persistent erythrocytosis. He is a chronic smoker and he started smoking since he was 16. He used to smoke ~ 2 PPD before and he is currently smoking about 10 - 15 cigarettes per day. Since he is found to have persistent erythrocytosis, he was subsequently referred to me for further evaluation. He denied high altitude living. He does not have any family history of erythrocytosis. Laboratory work ups done on 12/3/18 showed normal erythropoietin level, normal ESR, LDH, iron study. JAK2 V617F mutation was negative. Abdominal sonogram done on 18 showed no splenomegaly or splenic mass. Limited evaluation liver, though grossly unremarkable. Focal area of gallbladder wall thickening measuring 5.9 mm, though limited with regards to scanning technique per the technologist. The remainder the gallbladder appears unremarkable. Likely clinically insignificant. If clinically concerned, evaluation for cystic duct obstruction could be performed with nuclear medicine imaging.     Low dose screening CT scan of chest done on 3/4/19 showed 0.4 cm solid nodule in the right lower lobe. Sequelae of granulomatous disease. Minimal coronary atherosclerotic calcifications. Continue annual lung screening with LDCT in 12 months. (probability of malignancy <1%). CT lung screening on May 27, 2020 showed stable appearance of chronic bronchitis with nonobstructive right mainstream bronchus dependent secretions. Stable chronic right hemidiaphragm elevation resulting in chronic lung base atelectasis. No acute thoracic abnormality or findings concerning for malignancy. CT lung screening done on May 28, 2021 showed a stable exam.  No acute cardiopulmonary findings or new suspicious pulmonary nodules. Dilated main pulmonary artery which can be seen with pulmonary hypertension. On January 11, 2022, he presented to me for follow up. 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 He stated that his symptoms has improved some with phlebotomy. He has been under observation since then. CT lung screening done on May 28, 2021 showed no sign of malignant process noticed on CT scan and I recommended to have repeat CT lung screening on May 27, 2022. I will schedule for it and will follow up with the results. I recognized that his hematocrit today was 50.6% and I recommend for observation. He doesn't need phlebotomy this time. If his symptoms improve with phlebotomy, I will recommend to keep his hematocrit less than 52%. He stopped smoking since 1/1/2021. Otherwise, I will see him back in 6 months. He doesn't have any significant symptoms at today visit. Past Medical History  Significant for  1. Hypertension  2. Hyperlipidemia  3. Diabetes mellitus4. COPD  5. Osteoarthritis  6. Obesity    Surgical History  Significant for  1. Cyst removal from his tailbone area. Allergies  No known medication allergies    Social History  He is a former smoker and he quit smoking on 11/2020.  He started smoking since he was 16, until 11/2020. He used to smoke 2 pack/day. He denies alcohol drinking and illicit drug abuse. He is currently living in [facility]. Family History  Grandmother - Maternal (2nd Degree): Breast cancer    Oncology History    No history exists. Review of Systems: \"Per interval history; otherwise 10 point ROS is negative. \"  His energy level is fair and appetite is good. His sleep is good. He denies fever, chills, night sweats, cough, shortness of breath, chest pain, hemoptysis or palpitations. His bowel and bladder functions are normal. He doesn't have nausea, vomiting, diarrhea, constipation, dysuria, loss of appetite or weight loss. He denies neuropathy and he doesn't have bleeding or clotting issues. He denies any pain on today visit. No anxiety or depression. The rest of the systems are unremarkable. Vital Signs:  BP (!) 154/72 (Site: Right Upper Arm, Position: Sitting, Cuff Size: Medium Adult)   Pulse 103   Temp 98.3 °F (36.8 °C) (Infrared)   Ht 5' 10\" (1.778 m)   Wt 219 lb 3.2 oz (99.4 kg)   SpO2 90%   BMI 31.45 kg/m²     Physical Exam:  CONSTITUTIONAL: awake, no apparent distress, alert, cooperative,   EYES: pupils equal, round and reactive to light, sclera clear and conjunctiva normal  ENT: Normocephalic, without obvious abnormality, atraumatic  NECK: supple, symmetrical, no jugular venous distension and no carotid bruits   HEMATOLOGIC/LYMPHATIC: no cervical, supraclavicular or axillary lymphadenopathy   LUNGS: VBS, no wheezes, clear to auscultation, no rhonchi, no increased work of breathing, no crackles,  CARDIOVASCULAR: regular rate and rhythm, normal S1 and S2, no murmur noted  ABDOMEN: normal bowel sounds x 4, non-tender, no masses palpated, no hepatosplenomegaly, soft, non-distended,    MUSCULOSKELETAL: full range of motion noted, tone is normal  NEUROLOGIC: awake, alert, oriented to name, place and time. Motor skills grossly intact.    SKIN: Normal skin color, texture, turgor and no jaundice. appears intact   EXTREMITIES: no cyanosis, no clubbing, no LE edema, no leg swelling,    Labs:  Hematology:  Lab Results   Component Value Date    WBC 10.3 01/11/2022    RBC 5.92 01/11/2022    HGB 16.3 01/11/2022    HCT 50.6 01/11/2022    MCV 85.5 01/11/2022    MCH 27.5 01/11/2022    MCHC 32.2 01/11/2022    RDW 16.0 (H) 01/11/2022     01/11/2022    MPV 9.3 01/11/2022    SEGSPCT 67.2 (H) 01/11/2022    EOSRELPCT 4.5 (H) 01/11/2022    BASOPCT 0.7 01/11/2022    LYMPHOPCT 15.1 (L) 01/11/2022    MONOPCT 12.5 (H) 01/11/2022    SEGSABS 7.0 01/11/2022    EOSABS 0.5 01/11/2022    BASOSABS 0.1 01/11/2022    LYMPHSABS 1.6 01/11/2022    MONOSABS 1.3 01/11/2022    DIFFTYPE AUTOMATED DIFFERENTIAL 01/11/2022     Lab Results   Component Value Date    ESR 2 06/01/2021     Chemistry:  Lab Results   Component Value Date     (L) 06/01/2021    K 4.8 06/01/2021    CL 91 (L) 06/01/2021    CO2 37 (H) 06/01/2021    BUN 8 06/01/2021    CREATININE 0.6 (L) 06/01/2021    GLUCOSE 315 (H) 06/01/2021    CALCIUM 8.8 06/01/2021    PROT 6.5 06/01/2021    LABALBU 4.2 06/01/2021    BILITOT 0.5 06/01/2021    ALKPHOS 82 06/01/2021    AST 13 (L) 06/01/2021    ALT 11 06/01/2021    LABGLOM >60 06/01/2021    GFRAA >60 06/01/2021    MG 2.1 07/03/2018    POCGLU 173 (H) 07/06/2018     Lab Results   Component Value Date     06/01/2021     No components found for: LD  Lab Results   Component Value Date    TSHHS 0.749 07/03/2018     Immunology:  Lab Results   Component Value Date    PROT 6.5 06/01/2021     No results found for: Taz Deedee, KLFLCR  No results found for: B2M  Coagulation Panel:  No results found for: PROTIME, INR, APTT, DDIMER  Anemia Panel:  No results found for: [id number], FOLATE  Tumor Markers:  No results found for: , CEA, , LABCA2, PSA  Observations:  PHQ-9 Total Score: 0 (1/11/2022 10:52 AM)        Assessment & Plan:   Erythrocytosis    PLAN  Mr. Jeet Osman is a 60-year-old very pleasant patient who was found to have persistent erythrocytosis on routine blood tests done on November 8, 2018. Laboratory work ups done on 12/3/18 showed normal erythropoietin level, normal ESR, LDH, iron study. JAK2 V617F mutation was negative. Abdominal sonogram is also negative for erythropoietin producing tumor. Since all the work ups were negative and I believe his erythrocytosis is due to hypoxemia from chronic cigarette smoking. CT lung screening on May 27, 2020 showed stable appearance of chronic bronchitis with nonobstructive right mainstream bronchus dependent secretions. Stable chronic right hemidiaphragm elevation resulting in chronic lung base atelectasis. No acute thoracic abnormality or findings concerning for malignancy. CT lung screening done on May 28, 2021 showed a stable exam.  No acute cardiopulmonary findings or new suspicious pulmonary nodules. Dilated main pulmonary artery which can be seen with pulmonary hypertension. On January 11, 2022, he presented to me for follow up. 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 He stated that his symptoms has improved some with phlebotomy. He has been under observation since then. CT lung screening done on May 28, 2021 showed no sign of malignant process noticed on CT scan and I recommended to have repeat CT lung screening on May 27, 2022. I will schedule for it and will follow up with the results. I recognized that his hematocrit today was 50.6% and I recommend for observation. He doesn't need phlebotomy this time. If his symptoms improve with phlebotomy, I will recommend to keep his hematocrit less than 52%. He stopped smoking since 1/1/2021. Otherwise, I will see him back in 6 months. I answered all his questions and concerns for today. I asked him to follow-up with primary care physician on regular basis.     Recent imaging and labs were reviewed and

## 2022-01-11 ENCOUNTER — OFFICE VISIT (OUTPATIENT)
Dept: ONCOLOGY | Age: 62
End: 2022-01-11
Payer: MEDICARE

## 2022-01-11 ENCOUNTER — HOSPITAL ENCOUNTER (OUTPATIENT)
Dept: INFUSION THERAPY | Age: 62
Discharge: HOME | End: 2022-01-11
Payer: MEDICARE

## 2022-01-11 VITALS
WEIGHT: 219.2 LBS | BODY MASS INDEX: 31.38 KG/M2 | HEIGHT: 70 IN | DIASTOLIC BLOOD PRESSURE: 72 MMHG | HEART RATE: 103 BPM | TEMPERATURE: 98.3 F | SYSTOLIC BLOOD PRESSURE: 154 MMHG | OXYGEN SATURATION: 90 %

## 2022-01-11 DIAGNOSIS — D75.1 SECONDARY POLYCYTHEMIA: Primary | ICD-10-CM

## 2022-01-11 DIAGNOSIS — F17.210 CONTINUOUS DEPENDENCE ON CIGARETTE SMOKING: ICD-10-CM

## 2022-01-11 DIAGNOSIS — D75.1 SECONDARY POLYCYTHEMIA: ICD-10-CM

## 2022-01-11 LAB
ALBUMIN SERPL-MCNC: 4 GM/DL (ref 3.4–5)
ALP BLD-CCNC: 86 IU/L (ref 40–129)
ALT SERPL-CCNC: 10 U/L (ref 10–40)
ANION GAP SERPL CALCULATED.3IONS-SCNC: 12 MMOL/L (ref 4–16)
AST SERPL-CCNC: 12 IU/L (ref 15–37)
BASOPHILS ABSOLUTE: 0.1 K/CU MM
BASOPHILS RELATIVE PERCENT: 0.7 % (ref 0–1)
BILIRUB SERPL-MCNC: 0.4 MG/DL (ref 0–1)
BUN BLDV-MCNC: 13 MG/DL (ref 6–23)
CALCIUM SERPL-MCNC: 9.2 MG/DL (ref 8.3–10.6)
CHLORIDE BLD-SCNC: 94 MMOL/L (ref 99–110)
CO2: 31 MMOL/L (ref 21–32)
CREAT SERPL-MCNC: 0.7 MG/DL (ref 0.9–1.3)
DIFFERENTIAL TYPE: ABNORMAL
EOSINOPHILS ABSOLUTE: 0.5 K/CU MM
EOSINOPHILS RELATIVE PERCENT: 4.5 % (ref 0–3)
ERYTHROCYTE SEDIMENTATION RATE: 2 MM/HR (ref 0–20)
GFR AFRICAN AMERICAN: >60 ML/MIN/1.73M2
GFR NON-AFRICAN AMERICAN: >60 ML/MIN/1.73M2
GLUCOSE BLD-MCNC: 339 MG/DL (ref 70–99)
HCT VFR BLD CALC: 50.6 % (ref 42–52)
HEMOGLOBIN: 16.3 GM/DL (ref 13.5–18)
LACTATE DEHYDROGENASE: 170 IU/L (ref 120–246)
LYMPHOCYTES ABSOLUTE: 1.6 K/CU MM
LYMPHOCYTES RELATIVE PERCENT: 15.1 % (ref 24–44)
MCH RBC QN AUTO: 27.5 PG (ref 27–31)
MCHC RBC AUTO-ENTMCNC: 32.2 % (ref 32–36)
MCV RBC AUTO: 85.5 FL (ref 78–100)
MONOCYTES ABSOLUTE: 1.3 K/CU MM
MONOCYTES RELATIVE PERCENT: 12.5 % (ref 0–4)
PDW BLD-RTO: 16 % (ref 11.7–14.9)
PLATELET # BLD: 207 K/CU MM (ref 140–440)
PMV BLD AUTO: 9.3 FL (ref 7.5–11.1)
POTASSIUM SERPL-SCNC: 4.9 MMOL/L (ref 3.5–5.1)
RBC # BLD: 5.92 M/CU MM (ref 4.6–6.2)
SEGMENTED NEUTROPHILS ABSOLUTE COUNT: 7 K/CU MM
SEGMENTED NEUTROPHILS RELATIVE PERCENT: 67.2 % (ref 36–66)
SODIUM BLD-SCNC: 137 MMOL/L (ref 135–145)
TOTAL PROTEIN: 6.9 GM/DL (ref 6.4–8.2)
WBC # BLD: 10.3 K/CU MM (ref 4–10.5)

## 2022-01-11 PROCEDURE — 83615 LACTATE (LD) (LDH) ENZYME: CPT

## 2022-01-11 PROCEDURE — 1036F TOBACCO NON-USER: CPT | Performed by: INTERNAL MEDICINE

## 2022-01-11 PROCEDURE — 85025 COMPLETE CBC W/AUTO DIFF WBC: CPT

## 2022-01-11 PROCEDURE — 99211 OFF/OP EST MAY X REQ PHY/QHP: CPT

## 2022-01-11 PROCEDURE — 80053 COMPREHEN METABOLIC PANEL: CPT

## 2022-01-11 PROCEDURE — G8427 DOCREV CUR MEDS BY ELIG CLIN: HCPCS | Performed by: INTERNAL MEDICINE

## 2022-01-11 PROCEDURE — G8417 CALC BMI ABV UP PARAM F/U: HCPCS | Performed by: INTERNAL MEDICINE

## 2022-01-11 PROCEDURE — 36415 COLL VENOUS BLD VENIPUNCTURE: CPT

## 2022-01-11 PROCEDURE — G8482 FLU IMMUNIZE ORDER/ADMIN: HCPCS | Performed by: INTERNAL MEDICINE

## 2022-01-11 PROCEDURE — 99213 OFFICE O/P EST LOW 20 MIN: CPT | Performed by: INTERNAL MEDICINE

## 2022-01-11 PROCEDURE — 85652 RBC SED RATE AUTOMATED: CPT

## 2022-01-11 PROCEDURE — 3017F COLORECTAL CA SCREEN DOC REV: CPT | Performed by: INTERNAL MEDICINE

## 2022-01-11 RX ORDER — ALBUTEROL SULFATE 90 UG/1
2 AEROSOL, METERED RESPIRATORY (INHALATION) EVERY 6 HOURS PRN
COMMUNITY
End: 2022-01-17 | Stop reason: SDUPTHER

## 2022-01-11 ASSESSMENT — PATIENT HEALTH QUESTIONNAIRE - PHQ9
SUM OF ALL RESPONSES TO PHQ QUESTIONS 1-9: 0
SUM OF ALL RESPONSES TO PHQ QUESTIONS 1-9: 0
1. LITTLE INTEREST OR PLEASURE IN DOING THINGS: 0
2. FEELING DOWN, DEPRESSED OR HOPELESS: 0
SUM OF ALL RESPONSES TO PHQ QUESTIONS 1-9: 0
SUM OF ALL RESPONSES TO PHQ9 QUESTIONS 1 & 2: 0
SUM OF ALL RESPONSES TO PHQ QUESTIONS 1-9: 0

## 2022-01-11 NOTE — PROGRESS NOTES
MA Rooming Questions  Patient: Brandi Taylor  MRN: [id number]    Date: 1/11/2022        1. Do you have any new issues?   no         2. Do you need any refills on medications?    no    3. Have you had any imaging done since your last visit?   no    4. Have you been hospitalized or seen in the emergency room since your last visit here?   no    5. Did the patient have a depression screening completed today?  Yes    PHQ-9 Total Score: 0 (1/11/2022 10:52 AM)       PHQ-9 Given to (if applicable):               PHQ-9 Score (if applicable):                     [] Positive     [x]  Negative              Does question #9 need addressed (if applicable)                     [] Yes    []  No               Wilder Romero CMA

## 2022-01-17 ENCOUNTER — OFFICE VISIT (OUTPATIENT)
Dept: PULMONOLOGY | Age: 62
End: 2022-01-17
Payer: MEDICARE

## 2022-01-17 ENCOUNTER — OFFICE VISIT (OUTPATIENT)
Dept: FAMILY MEDICINE CLINIC | Age: 62
End: 2022-01-17
Payer: MEDICARE

## 2022-01-17 ENCOUNTER — TELEPHONE (OUTPATIENT)
Dept: FAMILY MEDICINE CLINIC | Age: 62
End: 2022-01-17

## 2022-01-17 VITALS
WEIGHT: 221.6 LBS | SYSTOLIC BLOOD PRESSURE: 125 MMHG | OXYGEN SATURATION: 90 % | HEART RATE: 119 BPM | DIASTOLIC BLOOD PRESSURE: 79 MMHG | HEIGHT: 70 IN | BODY MASS INDEX: 31.73 KG/M2

## 2022-01-17 VITALS
BODY MASS INDEX: 31.64 KG/M2 | SYSTOLIC BLOOD PRESSURE: 124 MMHG | HEIGHT: 70 IN | DIASTOLIC BLOOD PRESSURE: 74 MMHG | WEIGHT: 221 LBS | OXYGEN SATURATION: 90 % | HEART RATE: 110 BPM

## 2022-01-17 DIAGNOSIS — J44.9 COPD, SEVERE (HCC): ICD-10-CM

## 2022-01-17 DIAGNOSIS — Z00.00 ROUTINE GENERAL MEDICAL EXAMINATION AT A HEALTH CARE FACILITY: Primary | ICD-10-CM

## 2022-01-17 DIAGNOSIS — J96.21 ACUTE ON CHRONIC RESPIRATORY FAILURE WITH HYPOXEMIA (HCC): Primary | ICD-10-CM

## 2022-01-17 DIAGNOSIS — R06.02 SHORTNESS OF BREATH: ICD-10-CM

## 2022-01-17 DIAGNOSIS — Z87.891 FORMER SMOKER: ICD-10-CM

## 2022-01-17 DIAGNOSIS — J44.9 CHRONIC OBSTRUCTIVE PULMONARY DISEASE, UNSPECIFIED COPD TYPE (HCC): ICD-10-CM

## 2022-01-17 DIAGNOSIS — E11.65 UNCONTROLLED TYPE 2 DIABETES MELLITUS WITH HYPERGLYCEMIA (HCC): ICD-10-CM

## 2022-01-17 DIAGNOSIS — D75.1 SECONDARY POLYCYTHEMIA: ICD-10-CM

## 2022-01-17 LAB
CHOLESTEROL, TOTAL: 151 MG/DL (ref 0–199)
HBA1C MFR BLD: 8.1 %
HDLC SERPL-MCNC: 43 MG/DL (ref 40–60)
LDL CHOLESTEROL CALCULATED: 87 MG/DL
TRIGL SERPL-MCNC: 107 MG/DL (ref 0–150)
VLDLC SERPL CALC-MCNC: 21 MG/DL

## 2022-01-17 PROCEDURE — G8482 FLU IMMUNIZE ORDER/ADMIN: HCPCS | Performed by: FAMILY MEDICINE

## 2022-01-17 PROCEDURE — 83036 HEMOGLOBIN GLYCOSYLATED A1C: CPT | Performed by: FAMILY MEDICINE

## 2022-01-17 PROCEDURE — 3017F COLORECTAL CA SCREEN DOC REV: CPT | Performed by: INTERNAL MEDICINE

## 2022-01-17 PROCEDURE — G0438 PPPS, INITIAL VISIT: HCPCS | Performed by: FAMILY MEDICINE

## 2022-01-17 PROCEDURE — 3017F COLORECTAL CA SCREEN DOC REV: CPT | Performed by: FAMILY MEDICINE

## 2022-01-17 PROCEDURE — 3052F HG A1C>EQUAL 8.0%<EQUAL 9.0%: CPT | Performed by: FAMILY MEDICINE

## 2022-01-17 PROCEDURE — 1036F TOBACCO NON-USER: CPT | Performed by: INTERNAL MEDICINE

## 2022-01-17 PROCEDURE — G8482 FLU IMMUNIZE ORDER/ADMIN: HCPCS | Performed by: INTERNAL MEDICINE

## 2022-01-17 PROCEDURE — G8417 CALC BMI ABV UP PARAM F/U: HCPCS | Performed by: INTERNAL MEDICINE

## 2022-01-17 PROCEDURE — G8427 DOCREV CUR MEDS BY ELIG CLIN: HCPCS | Performed by: INTERNAL MEDICINE

## 2022-01-17 PROCEDURE — 3023F SPIROM DOC REV: CPT | Performed by: INTERNAL MEDICINE

## 2022-01-17 PROCEDURE — 99214 OFFICE O/P EST MOD 30 MIN: CPT | Performed by: INTERNAL MEDICINE

## 2022-01-17 RX ORDER — ALBUTEROL SULFATE 90 UG/1
2 AEROSOL, METERED RESPIRATORY (INHALATION) EVERY 6 HOURS PRN
Qty: 18 G | Refills: 5 | Status: SHIPPED | OUTPATIENT
Start: 2022-01-17 | End: 2022-02-17 | Stop reason: SDUPTHER

## 2022-01-17 RX ORDER — ALBUTEROL SULFATE 2.5 MG/3ML
2.5 SOLUTION RESPIRATORY (INHALATION) EVERY 6 HOURS PRN
Qty: 120 EACH | Refills: 3 | Status: SHIPPED | OUTPATIENT
Start: 2022-01-17

## 2022-01-17 RX ORDER — GLUCOSAMINE HCL/CHONDROITIN SU 500-400 MG
CAPSULE ORAL
Qty: 100 STRIP | Refills: 5 | Status: SHIPPED | OUTPATIENT
Start: 2022-01-17

## 2022-01-17 RX ORDER — GLUCOSAMINE HCL/CHONDROITIN SU 500-400 MG
CAPSULE ORAL
Qty: 100 STRIP | Refills: 0 | Status: SHIPPED | OUTPATIENT
Start: 2022-01-17 | End: 2022-07-18 | Stop reason: SDUPTHER

## 2022-01-17 RX ORDER — LANCETS 30 GAUGE
EACH MISCELLANEOUS
Qty: 100 EACH | Refills: 5 | Status: SHIPPED | OUTPATIENT
Start: 2022-01-17 | End: 2022-07-18 | Stop reason: SDUPTHER

## 2022-01-17 ASSESSMENT — PATIENT HEALTH QUESTIONNAIRE - PHQ9
SUM OF ALL RESPONSES TO PHQ QUESTIONS 1-9: 3
7. TROUBLE CONCENTRATING ON THINGS, SUCH AS READING THE NEWSPAPER OR WATCHING TELEVISION: 0
SUM OF ALL RESPONSES TO PHQ9 QUESTIONS 1 & 2: 2
4. FEELING TIRED OR HAVING LITTLE ENERGY: 1
SUM OF ALL RESPONSES TO PHQ QUESTIONS 1-9: 3
6. FEELING BAD ABOUT YOURSELF - OR THAT YOU ARE A FAILURE OR HAVE LET YOURSELF OR YOUR FAMILY DOWN: 0
10. IF YOU CHECKED OFF ANY PROBLEMS, HOW DIFFICULT HAVE THESE PROBLEMS MADE IT FOR YOU TO DO YOUR WORK, TAKE CARE OF THINGS AT HOME, OR GET ALONG WITH OTHER PEOPLE: 0
1. LITTLE INTEREST OR PLEASURE IN DOING THINGS: 1
SUM OF ALL RESPONSES TO PHQ QUESTIONS 1-9: 3
2. FEELING DOWN, DEPRESSED OR HOPELESS: 1
3. TROUBLE FALLING OR STAYING ASLEEP: 0
5. POOR APPETITE OR OVEREATING: 0
8. MOVING OR SPEAKING SO SLOWLY THAT OTHER PEOPLE COULD HAVE NOTICED. OR THE OPPOSITE, BEING SO FIGETY OR RESTLESS THAT YOU HAVE BEEN MOVING AROUND A LOT MORE THAN USUAL: 0
9. THOUGHTS THAT YOU WOULD BE BETTER OFF DEAD, OR OF HURTING YOURSELF: 0
SUM OF ALL RESPONSES TO PHQ QUESTIONS 1-9: 3

## 2022-01-17 ASSESSMENT — ENCOUNTER SYMPTOMS
COUGH: 0
SHORTNESS OF BREATH: 0
BACK PAIN: 0

## 2022-01-17 ASSESSMENT — LIFESTYLE VARIABLES: HOW OFTEN DO YOU HAVE A DRINK CONTAINING ALCOHOL: 0

## 2022-01-17 NOTE — PROGRESS NOTES
Medicare Annual Wellness Visit  Name: Trev Dean Date: 2022   MRN: [id number] Sex: Male   Age: 62 y.o. Ethnicity: Non- / Non    : 1960 Race: White (non-)      Kit Schultz is here for Medicare AWV    Screenings for behavioral, psychosocial and functional/safety risks, and cognitive dysfunction are all negative except as indicated below. These results, as well as other patient data from the [address] form, are documented in Flowsheets linked to this Encounter. No Known Allergies      Prior to Visit Medications    Medication Sig Taking? Authorizing Provider   blood glucose monitor strips Test 3 times a day & as needed for symptoms of irregular blood glucose. Please fill with what is coverd by insurance Yes Tracy Barrett MD   blood glucose monitor strips Test 3-4 times a day & as needed for symptoms of irregular blood glucose.  Yes Tracy Barrett MD   blood glucose monitor kit and supplies Use 3-4 times daily Yes Tracy Barrett MD   Lancets MISC Use one lancet 4 times daily Yes Tracy Barrett MD   albuterol (PROVENTIL) (2.5 MG/3ML) 0.083% nebulizer solution Take 3 mLs by nebulization every 6 hours as needed for Wheezing Yes Tracy Barrett MD   albuterol sulfate HFA (VENTOLIN HFA) 108 (90 Base) MCG/ACT inhaler Inhale 2 puffs into the lungs every 6 hours as needed for Wheezing Yes Tracy Barrett MD   glimepiride (AMARYL) 1 MG tablet Take 1 tablet by mouth 2 times daily Yes Tracy Barrett MD   metFORMIN (GLUCOPHAGE-XR) 500 MG extended release tablet TAKE 2 TABLETS BY MOUTH TWICE A DAY Yes Tracy Barrett MD         Past Medical History:   Diagnosis Date    Acute on chronic respiratory failure with hypoxemia ([address] 2022    Arthritis     Cataract     COPD (chronic obstructive pulmonary disease) (HCC)     COPD, severe ([address] 2022    Diabetes mellitus [facility]     Enlarged prostate     Former smoker 2022    Hyperlipidemia     Hypertension        Past Surgical History:   Procedure Laterality Date    COLONOSCOPY      SKIN BIOPSY      TONSILLECTOMY         History reviewed. No pertinent family history. CareTeam (Including outside providers/suppliers regularly involved in providing care):   Patient Care Team:  Seng Nuñez MD as PCP - General (Family Medicine)  Seng Nuñez MD as PCP - [facility]    Wt Readings from Last 3 Encounters:   01/17/22 221 lb 9.6 oz (100.5 kg)   01/17/22 221 lb (100.2 kg)   01/11/22 219 lb 3.2 oz (99.4 kg)     Vitals:    01/17/22 0942   BP: 125/79   Site: Left Upper Arm   Position: Sitting   Cuff Size: Large Adult   Pulse: 119   SpO2: (!) 82%   Weight: 221 lb 9.6 oz (100.5 kg)   Height: 5' 10\" (1.778 m)     Body mass index is 31.8 kg/m². Based upon direct observation of the patient, evaluation of cognition reveals recent and remote memory intact. Patient's complete Health Risk Assessment and screening values have been reviewed and are found in Flowsheets. The following problems were reviewed today and where indicated follow up appointments were made and/or referrals ordered. Positive Risk Factor Screenings with Interventions:     Fall Risk:  2 or more falls in past year?: (!) yes  Fall with injury in past year?: no  Fall Risk Interventions:    · doing fine            General Health and ACP:  General  In general, how would you say your health is?: (!) Poor  In the past 7 days, have you experienced any of the following?  New or Increased Pain, New or Increased Fatigue, Loneliness, Social Isolation, Stress or Anger?: (!) Social Isolation  Do you get the social and emotional support that you need?: Yes  Do you have a Living Will?: (!) No  Advance Directives     Power of [address] [place] [address] Will ACP-Advance Directive ACP-Power of     Not on File Not on File Not on File Not on File      General Health Risk Interventions:  · needs to work on the living will    Health Habits/Nutrition:  Health Habits/Nutrition  Do you exercise for at least 20 minutes 2-3 times per week?: (!) No  Have you lost any weight without trying in the past 3 months?: No  Do you eat only one meal per day?: No  Have you seen the dentist within the past year?: (!) No  Body mass index: (!) 31.79  Health Habits/Nutrition Interventions:  · no teeth ,has no denture    Hearing/Vision:  No exam data present  Hearing/Vision  Do you or your family notice any trouble with your hearing that hasn't been managed with hearing aids?: No  Do you have difficulty driving, watching TV, or doing any of your daily activities because of your eyesight?: (!) Yes  Hearing/Vision Interventions:  · both okay    Safety:  Safety  Do you have working smoke detectors?: Yes  Have all throw rugs been removed or fastened?: (!) No  Do you have non-slip mats or surfaces in all bathtubs/showers?: Yes  Do all of your stairways have a railing or banister?: Yes  Are your doorways, halls and stairs free of clutter?: Yes  Do you always fasten your seatbelt when you are in a car?: Yes  Safety Interventions:  · doing fine   · Still driving  · Lives with his sister  · No assisting walking device       Personalized Preventive Plan   Current Health Maintenance Status  Immunization History   Administered Date(s) Administered    COVID-19, Car Peter, PF, 30mcg/0.3mL 04/06/2021, 04/27/2021, 12/22/2021    Influenza, MDCK Quadv, IM, PF (Flucelvax 2 yrs and older) 10/07/2021        Health Maintenance   Topic Date Due    Hepatitis C screen  Never done    Pneumococcal 0-64 years Vaccine (1 of 2 - PPSV23) Never done    Diabetic foot exam  Never done    HIV screen  Never done    Diabetic microalbuminuria test  Never done    Diabetic retinal exam  Never done    DTaP/Tdap/Td vaccine (1 - Tdap) Never done    Colon cancer screen colonoscopy  Never done    Shingles Vaccine (1 of 2) Never done    Lipid screen  07/03/2019    Annual Wellness Visit (AWV)  Never done    Low dose CT lung screening  05/28/2022    A1C test (Diabetic or Prediabetic)  01/17/2023    Depression Screen  01/17/2023    Flu vaccine  Completed    COVID-19 Vaccine  Completed    Hepatitis A vaccine  Aged Out    Hib vaccine  Aged Out    Meningococcal (ACWY) vaccine  Aged Out     Recommendations for [employer] Due: see orders and patient instructions/AVS.  . Recommended screening schedule for the next 5-10 years is provided to the patient in written form: see Patient Instructions/AVS.    Roseanne Whitney was seen today for medicare aw. Diagnoses and all orders for this visit:    Routine general medical examination at a health care facility    Uncontrolled type 2 diabetes mellitus with hyperglycemia ([facility]  -     blood glucose monitor strips; Test 3 times a day & as needed for symptoms of irregular blood glucose. Please fill with what is coverd by insurance  -     POCT glycosylated hemoglobin (Hb A1C)  -     Lipid Panel; Future    Chronic obstructive pulmonary disease, unspecified COPD type (HCC)  -     albuterol (PROVENTIL) (2.5 MG/3ML) 0.083% nebulizer solution; Take 3 mLs by nebulization every 6 hours as needed for Wheezing    Other orders  -     blood glucose monitor strips; Test 3-4 times a day & as needed for symptoms of irregular blood glucose. -     blood glucose monitor kit and supplies; Use 3-4 times daily  -     Lancets MISC; Use one lancet 4 times daily  -     albuterol sulfate HFA (VENTOLIN HFA) 108 (90 Base) MCG/ACT inhaler;  Inhale 2 puffs into the lungs every 6 hours as needed for Wheezing

## 2022-01-17 NOTE — TELEPHONE ENCOUNTER
Albuterol Nebulizer  Request for PA recieved via fax. Accessed chart to verifiy PA need.  Outcome:  PA needed & initiated awaiting response

## 2022-01-17 NOTE — PROGRESS NOTES
Wanda Carlson Maryellen  23    Chief Complaint   Patient presents with    Medicare AWV    3 Month Follow-Up    Diabetes    COPD           Patient here for [address] and also for 3 months f/u regarding his DM, and COPD, we restart him on metformin first visit and add the glimepiride second visit. patient tolerate the medication and doing fine. He is on Oxygen but didn't bring it with him today. Past Medical History:   Diagnosis Date    Acute on chronic respiratory failure with hypoxemia ([address] 2022    Arthritis     Cataract     COPD (chronic obstructive pulmonary disease) (HCC)     COPD, severe ([address] 2022    Diabetes mellitus ([facility]     Enlarged prostate     Former smoker 2022    Hyperlipidemia     Hypertension      Past Surgical History:   Procedure Laterality Date    COLONOSCOPY      SKIN BIOPSY      TONSILLECTOMY       History reviewed. No pertinent family history. Social History     Socioeconomic History    Marital status: Single     Spouse name: Not on file    Number of children: Not on file    Years of education: Not on file    Highest education level: Not on file   Occupational History    Not on file   Tobacco Use    Smoking status: Former Smoker     Packs/day: 0.50     Years: 47.00     Pack years: 23.50     Types: Cigarettes     Start date: 1973     Quit date: 2021     Years since quittin.0    Smokeless tobacco: Never Used   Substance and Sexual Activity    Alcohol use: No    Drug use: No    Sexual activity: Not on file     Comment: deferred   Other Topics Concern    Not on file   Social History Narrative    Not on file     Social Determinants of Health     Financial Resource Strain:     Difficulty of Paying Living Expenses: Not on file   Food Insecurity:     Worried About Running Out of Food in the Last Year: Not on file    Juancarlos of Food in the Last Year: Not on file   Transportation Needs:     Lack of Transportation (Medical):  Not on file    Lack of Transportation (Non-Medical): Not on file   Physical Activity:     Days of Exercise per Week: Not on file    Minutes of Exercise per Session: Not on file   Stress:     Feeling of Stress : Not on file   Social Connections:     Frequency of Communication with Friends and Family: Not on file    Frequency of Social Gatherings with Friends and Family: Not on file    Attends Taoist Services: Not on file    Active Member of [address] [place] [address] or Organizations: Not on file    Attends Club or Organization Meetings: Not on file    Marital Status: Not on file   Intimate Partner Violence:     Fear of Current or Ex-Partner: Not on file    Emotionally Abused: Not on file    Physically Abused: Not on file    Sexually Abused: Not on file   Housing Stability:     Unable to Pay for Housing in the Last Year: Not on file    Number of [place] in the Last Year: Not on file    Unstable Housing in the Last Year: Not on file       No Known Allergies  Current Outpatient Medications   Medication Sig Dispense Refill    blood glucose monitor strips Test 3 times a day & as needed for symptoms of irregular blood glucose. Please fill with what is coverd by insurance 100 strip 0    blood glucose monitor strips Test 3-4 times a day & as needed for symptoms of irregular blood glucose.  100 strip 5    blood glucose monitor kit and supplies Use 3-4 times daily 1 kit 0    Lancets MISC Use one lancet 4 times daily 100 each 5    albuterol (PROVENTIL) (2.5 MG/3ML) 0.083% nebulizer solution Take 3 mLs by nebulization every 6 hours as needed for Wheezing 120 each 3    albuterol sulfate HFA (VENTOLIN HFA) 108 (90 Base) MCG/ACT inhaler Inhale 2 puffs into the lungs every 6 hours as needed for Wheezing 18 g 5    glimepiride (AMARYL) 1 MG tablet Take 1 tablet by mouth 2 times daily 60 tablet 3    metFORMIN (GLUCOPHAGE-XR) 500 MG extended release tablet TAKE 2 TABLETS BY MOUTH TWICE A  tablet 5     No current facility-administered medications for this visit. Review of Systems   Constitutional: Negative for activity change, appetite change, chills, fatigue and fever. HENT: Negative for congestion. Respiratory: Negative for cough and shortness of breath. Cardiovascular: Negative for chest pain and leg swelling. Musculoskeletal: Negative for back pain. Skin: Negative for rash. Neurological: Negative for dizziness and headaches. Psychiatric/Behavioral: Negative for agitation, behavioral problems and sleep disturbance. The patient is not nervous/anxious.         Lab Results   Component Value Date    WBC 10.3 01/11/2022    HGB 16.3 01/11/2022    HCT 50.6 01/11/2022    MCV 85.5 01/11/2022     01/11/2022     Lab Results   Component Value Date     01/11/2022    K 4.9 01/11/2022    CL 94 (L) 01/11/2022    CO2 31 01/11/2022    BUN 13 01/11/2022    CREATININE 0.7 (L) 01/11/2022    GLUCOSE 339 (H) 01/11/2022    CALCIUM 9.2 01/11/2022    PROT 6.9 01/11/2022    LABALBU 4.0 01/11/2022    BILITOT 0.4 01/11/2022    ALKPHOS 86 01/11/2022    AST 12 (L) 01/11/2022    ALT 10 01/11/2022    LABGLOM >60 01/11/2022    GFRAA >60 01/11/2022     Lab Results   Component Value Date    CHOL 110 07/03/2018     Lab Results   Component Value Date    TRIG 62 07/03/2018     Lab Results   Component Value Date    HDL 38 (L) 07/03/2018     No results found for: LDLCALC, LDLCHOLESTEROL  Lab Results   Component Value Date    LABA1C 8.1 01/17/2022     Lab Results   Component Value Date    TSHHS 0.749 07/03/2018         /79 (Site: Left Upper Arm, Position: Sitting, Cuff Size: Large Adult)   Pulse 119   Ht 5' 10\" (1.778 m)   Wt 221 lb 9.6 oz (100.5 kg)   SpO2 90%   BMI 31.80 kg/m²     BP Readings from Last 3 Encounters:   01/17/22 125/79   01/17/22 124/74   01/11/22 (!) 154/72       Wt Readings from Last 3 Encounters:   01/17/22 221 lb 9.6 oz (100.5 kg)   01/17/22 221 lb (100.2 kg)   01/11/22 219 lb 3.2 oz (99.4 kg) Physical Exam  Constitutional:       General: He is not in acute distress. Appearance: Normal appearance. He is well-developed. He is not ill-appearing or diaphoretic. HENT:      Head: Normocephalic and atraumatic. Eyes:      General: No scleral icterus. Pupils: Pupils are equal, round, and reactive to light. Cardiovascular:      Rate and Rhythm: Normal rate and regular rhythm. Heart sounds: Normal heart sounds. No murmur heard. Pulmonary:      Effort: Pulmonary effort is normal.      Breath sounds: Normal breath sounds. No wheezing. Musculoskeletal:         General: Normal range of motion. Cervical back: Normal range of motion and neck supple. No rigidity. Right lower leg: No edema. Left lower leg: No edema. Neurological:      General: No focal deficit present. Mental Status: He is alert and oriented to person, place, and time. Psychiatric:         Mood and Affect: Mood normal.         Behavior: Behavior normal.         ASSESSMENT/ PLAN:    1. Routine general medical examination at a health care facility  - stable    2. Uncontrolled type 2 diabetes mellitus with hyperglycemia ([facility]  - getting much better  - blood glucose monitor strips; Test 3 times a day & as needed for symptoms of irregular blood glucose. Please fill with what is coverd by insurance  Dispense: 100 strip; Refill: 0  - POCT glycosylated hemoglobin (Hb A1C)  - blood glucose monitor strips; Test 3-4 times a day & as needed for symptoms of irregular blood glucose. Dispense: 100 strip; Refill: 5  - blood glucose monitor kit and supplies; Use 3-4 times daily  Dispense: 1 kit; Refill: 0  - Lancets MISC; Use one lancet 4 times daily  Dispense: 100 each; Refill: 5  - Lipid Panel; Future    3. Chronic obstructive pulmonary disease, unspecified COPD type ([facility]  - stable, f/u with the pulmonologist   - albuterol (PROVENTIL) (2.5 MG/3ML) 0.083% nebulizer solution;  Take 3 mLs by nebulization every 6 hours as needed for Wheezing  Dispense: 120 each; Refill: 3  - albuterol sulfate HFA (VENTOLIN HFA) 108 (90 Base) MCG/ACT inhaler; Inhale 2 puffs into the lungs every 6 hours as needed for Wheezing  Dispense: 18 g; Refill: 5              - All old blood work reviewed with the patient  - Appropriate prescription are addressed. - After visit summery provided. - Questions answered and patient verbalizes understanding.  - Call for any problem, questions, or concerns. Return for Medicare Annual Wellness Visit in 1 year.  and in 6 months for f/u

## 2022-01-17 NOTE — PATIENT INSTRUCTIONS
Personalized Preventive Plan for Hudson Gunter - 1/17/2022  Medicare offers a range of preventive health benefits. Some of the tests and screenings are paid in full while other may be subject to a deductible, co-insurance, and/or copay. Some of these benefits include a comprehensive review of your medical history including lifestyle, illnesses that may run in your family, and various assessments and screenings as appropriate. After reviewing your medical record and screening and assessments performed today your provider may have ordered immunizations, labs, imaging, and/or referrals for you. A list of these orders (if applicable) as well as your Preventive Care list are included within your After Visit Summary for your review. Other Preventive Recommendations:    · A preventive eye exam performed by an eye specialist is recommended every 1-2 years to screen for glaucoma; cataracts, macular degeneration, and other eye disorders. · A preventive dental visit is recommended every 6 months. · Try to get at least 150 minutes of exercise per week or 10,000 steps per day on a pedometer . · Order or download the FREE \"Exercise & Physical Activity: Your Everyday Guide\" from [employer]. Call 4-618.239.9939 or search [employer] online. · You need 4867-8267 mg of calcium and 0550-1949 IU of vitamin D per day. It is possible to meet your calcium requirement with diet alone, but a vitamin D supplement is usually necessary to meet this goal.  · When exposed to the sun, use a sunscreen that protects against both UVA and UVB radiation with an SPF of 30 or greater. Reapply every 2 to 3 hours or after sweating, drying off with a towel, or swimming. · Always wear a seat belt when traveling in a car. Always wear a helmet when riding a bicycle or motorcycle.

## 2022-01-17 NOTE — PROGRESS NOTES
SUBJECTIVE:  Chief Complaint: Severe COPD by FEV1 criteria, possible restrictive lung disease, shortness of breath, acute on chronic hypoxemic respiratory failure, right lower lobe lung nodule, former smoker  Mr. Ehsan Castelan came into the office to discuss results of his pulmonary function test.  He had been seen by Thiago Haney over 1.5 years ago but states that he had gone back to [place] for almost a year. He mentions that he quit smoking a year and 1 month ago. He continues note shortness of breath. He could not afford Symbicort and simply been using an albuterol rescue inhaler as needed but even ran out of that until recently. He continues to follow with Dr. Alondra Jones concerning his secondary polycythemia which is thought to be from his chronic hypoxemia and COPD. He has had no known COVID-19 exposure or infection and has received all 3 Pfizer COVID-19 vaccinations and his flu vaccine. He mentions that he wears oxygen on and off day and night but does not have a home oximeter. He has not been requalified. ROS:  Constitution:  HEENT: Negative for ear, throat pain  Cardiovascular: Negative for chest pain, syncope, edema  Pulmonary: See HPI  Musculoskeletal: Negative for DVT, myalgias, arthralgias    OBJECTIVE:  /74   Pulse 110   Ht 5' 10\" (1.778 m)   Wt 221 lb (100.2 kg)   SpO2 90%   BMI 31.71 kg/m²      Physical Exam:  Constitutional:  He appears well developed and well-nourished. Neck:  Supple, No palpable lymphadenopathy, No JVD  Cardiovascular:  S1, S2 Normal, Regular rhythm, no murmurs or gallops, No pericardial  rubs.   Pulmonary: Diminished breath sounds throughout all lung areas with a few scattered rhonchi and basilar wheezes  Abdomen: Not examined  Extremities: no edema, No DVT  Neurologic: Oriented x3, No focal deficits    Radiology: Low-dose CT lung screen 5/28/2021  Stable exam.  No acute cardiopulmonary findings or new suspicious pulmonary   nodules.       Dilated main pulmonary artery which can be seen with pulmonary hypertension. Home oxygen saturation study:  O2 saturation on room air at rest-91%  O2 saturation on room air with ambulation-87%  O2 saturation on 2 L nasal oxygen with ambulation- 93%  PFT: Spirometry at [facility] on 9/1/2021 demonstrated a severe obstructive defect by FEV1 criteria. There was no significant response to bronchodilators. Severe restriction suggesting interstitial process. Abnormal diffusion capacity      Echocardiogram: 7/3/2018  Left ventricular systolic function is normal.   Ejection fraction is visually estimated at 50-55%. Mild left ventricular hypertrophy. Grade I diastolic dysfunction. Dilated right heart and vena cava without significant TR. Mild mitral annular calcification is present. No evidence of any pericardial effusion    ASSESSMENT:    1. Acute on chronic respiratory failure with hypoxemia (HCC)    2. COPD, severe ([address]    3. Shortness of breath    4. Former smoker    5. Secondary polycythemia          PLAN:   Mr. Delano Main does require oxygen 24 hours a day and would benefit from a small portable battery-powered unit for ambulation. He currently has a large unit to use for ambulation and it is difficult for him to get around and outside of his home. He does not have a home oximeter and I recommended he get one and test himself during the day and if he remains on room air above 90% O2 saturation he can leave oxygen off. He  certainly will need it at nighttime and with ambulation. He is not on a long-acting bronchodilator or inhaled anti-inflammatory. He previously was on Symbicort but states he cannot afford it. I have given him several samples of Breztri 2 inhalations twice a day and asked him to check with his pharmacy concerning a number of LABA/LAMA and LABA/ICS combination drugs which she could use in the future to help control his COPD.   He states that he stopped smoking 1 year and 1 month ago and I encouraged her to continue to do so. He does have a history of a right lower lobe lung nodule and I note that he will have a repeat low-dose CT lung screening in the near future. I will make no change in his short acting bronchodilator therapy. I will continue to follow him    We have discussed the need to maintain yearly flu immunization, pneumococcal vaccination. We have discussed Coronavirus precaution including handwashing practice, wiping items touched in public such as gas pumps, door handles, shopping carts, etc. Self monitoring for infection - fever, chills, cough, SOB. Should they develop symptoms they should call office for further instructions. Return in about 4 weeks (around 2/14/2022) for Recheck for COPD, Recheck for Shortness of Breath, chronic hypoxemic respiratory failure. This dictation was performed with a verbal recognition program and it was checked for errors. It is possible that there are still dictated errors within this office note. Any errors should be brought immediately to my attention for correction. All efforts were made to ensure that this office note is accurate.

## 2022-01-24 DIAGNOSIS — E11.65 UNCONTROLLED TYPE 2 DIABETES MELLITUS WITH HYPERGLYCEMIA (HCC): ICD-10-CM

## 2022-01-25 RX ORDER — GLIMEPIRIDE 1 MG/1
TABLET ORAL
Qty: 180 TABLET | Refills: 1 | Status: SHIPPED | OUTPATIENT
Start: 2022-01-25 | End: 2022-07-18 | Stop reason: SDUPTHER

## 2022-01-28 ENCOUNTER — TELEPHONE (OUTPATIENT)
Dept: ONCOLOGY | Age: 62
End: 2022-01-28

## 2022-01-28 NOTE — TELEPHONE ENCOUNTER
Pt is going to [facility] on 5/30 1100 arrival for a 1130-- no prep-- pt is aware of appt and stated understanding

## 2022-02-17 ENCOUNTER — OFFICE VISIT (OUTPATIENT)
Dept: PULMONOLOGY | Age: 62
End: 2022-02-17
Payer: MEDICARE

## 2022-02-17 VITALS
DIASTOLIC BLOOD PRESSURE: 64 MMHG | HEART RATE: 97 BPM | OXYGEN SATURATION: 85 % | HEIGHT: 70 IN | BODY MASS INDEX: 31.78 KG/M2 | SYSTOLIC BLOOD PRESSURE: 144 MMHG | WEIGHT: 222 LBS

## 2022-02-17 DIAGNOSIS — J44.9 CHRONIC OBSTRUCTIVE PULMONARY DISEASE, UNSPECIFIED COPD TYPE (HCC): ICD-10-CM

## 2022-02-17 DIAGNOSIS — Z87.891 FORMER SMOKER: ICD-10-CM

## 2022-02-17 DIAGNOSIS — R06.02 SHORTNESS OF BREATH: ICD-10-CM

## 2022-02-17 DIAGNOSIS — J44.9 COPD, SEVERE (HCC): Primary | ICD-10-CM

## 2022-02-17 DIAGNOSIS — J96.21 ACUTE ON CHRONIC RESPIRATORY FAILURE WITH HYPOXEMIA (HCC): ICD-10-CM

## 2022-02-17 PROCEDURE — G8417 CALC BMI ABV UP PARAM F/U: HCPCS | Performed by: INTERNAL MEDICINE

## 2022-02-17 PROCEDURE — G8427 DOCREV CUR MEDS BY ELIG CLIN: HCPCS | Performed by: INTERNAL MEDICINE

## 2022-02-17 PROCEDURE — 3017F COLORECTAL CA SCREEN DOC REV: CPT | Performed by: INTERNAL MEDICINE

## 2022-02-17 PROCEDURE — 3023F SPIROM DOC REV: CPT | Performed by: INTERNAL MEDICINE

## 2022-02-17 PROCEDURE — G8482 FLU IMMUNIZE ORDER/ADMIN: HCPCS | Performed by: INTERNAL MEDICINE

## 2022-02-17 PROCEDURE — 99213 OFFICE O/P EST LOW 20 MIN: CPT | Performed by: INTERNAL MEDICINE

## 2022-02-17 PROCEDURE — 1036F TOBACCO NON-USER: CPT | Performed by: INTERNAL MEDICINE

## 2022-02-17 RX ORDER — ALBUTEROL SULFATE 90 UG/1
2 AEROSOL, METERED RESPIRATORY (INHALATION) EVERY 6 HOURS PRN
Qty: 1 EACH | Refills: 11 | Status: SHIPPED | OUTPATIENT
Start: 2022-02-17

## 2022-02-17 NOTE — PROGRESS NOTES
SUBJECTIVE:  Chief Complaint: Severe COPD by FEV1 criteria, possible restrictive lung disease, shortness of breath, chronic hypoxemic respiratory  failure, right lower lobe lung nodule, former smoker    Baldemar Villa states that he did not get his long-acting inhaled bronchodilator filled because of insurance reasons. He does use an albuterol rescue inhaler as needed. He has had no recent bronchitic infections and denies worsening shortness of breath or chest discomfort  He has had no known COVID-19 exposure or infection. He has received all 3 Pfizer COVID-19 vaccinations. He continues to wear oxygen 24 hours a day but does not have a small portable tank for ambulation and is not using it when he leaves the home  He quit smoking over a year ago  ROS:  Constitution:  HEENT: Negative for ear, throat pain  Cardiovascular: Negative for chest pain, syncope, edema  Pulmonary: See HPI  Musculoskeletal: Negative for DVT, myalgias, arthralgias    OBJECTIVE:  BP (!) 144/64   Pulse 97   Ht 5' 10\" (1.778 m)   Wt 222 lb (100.7 kg)   SpO2 (!) 85%   BMI 31.85 kg/m²      Physical Exam:  Constitutional:  He appears well developed and well-nourished. Not in respiratory distress at rest. O2 saturation 85% on room air  Neck:  Supple, No palpable lymphadenopathy, No JVD  Cardiovascular:  S1, S2 Normal, Regular rhythm, no murmurs or gallops, No pericardial  rubs. Pulmonary: Diminished breath sounds bilateral. No wheezing or rhonchi noted  Abdomen: Not examined  Extremities: no edema, No DVT  Neurologic: Oriented x3, No focal deficits    Radiology: Low-dose CT lung screen 5/28/2021 showed no acute cardiopulmonary findings or new suspicious pulmonary nodules  PFT: [facility] on 9[facility]/2021 demonstrated a severe obstructive lung defect by FEV1 criteria. There was no significant response to bronchodilators. Severe restriction suggesting interstitial process.  Abnormal diffusion capacity      Echocardiogram: Echo last done on 7/3/2018 showed grade 1 diastolic dysfunction. No mention of pulmonary hypertension    ASSESSMENT:    1. COPD, severe ([facility] [address]    2. Chronic obstructive pulmonary disease, unspecified COPD type ([address]    3. Acute on chronic respiratory failure with hypoxemia (H[facility])    4. Shortness of breath    5. Former smoker          PLAN:   I gave him a sample of Breo to use until we can sort out what long-acting bronchodilator he can use. I would prefer him on a LABA/LAMA/ICS but if not I would use a LABA/LAMA combination inhaled bronchodilator. He is scheduled already for a repeat CT lung screening in May 2022 and I will review that when available. He does need a small portable oxygen battery-operated oxygen unit for ambulation and our office will start to work on that. I will continue to follow him    We have discussed the need to maintain yearly flu immunization, pneumococcal vaccination. We have discussed Coronavirus precaution including handwashing practice, wiping items touched in public such as gas pumps, door handles, shopping carts, etc. Self monitoring for infection - fever, chills, cough, SOB. Should they develop symptoms they should call office for further instructions. Return in about 4 months (around 6/17/2022) for Recheck for COPD, Recheck for Shortness of Breath, chronic hypoxemic respiratory failure. This dictation was performed with a verbal recognition program and it was checked for errors. It is possible that there are still dictated errors within this office note. Any errors should be brought immediately to my attention for correction. All efforts were made to ensure that this office note is accurate.

## 2022-03-30 DIAGNOSIS — E11.65 UNCONTROLLED TYPE 2 DIABETES MELLITUS WITH HYPERGLYCEMIA (HCC): ICD-10-CM

## 2022-03-30 RX ORDER — METFORMIN HYDROCHLORIDE 500 MG/1
TABLET, EXTENDED RELEASE ORAL
Qty: 360 TABLET | Refills: 1 | Status: SHIPPED | OUTPATIENT
Start: 2022-03-30 | End: 2022-10-17

## 2022-05-31 ENCOUNTER — HOSPITAL ENCOUNTER (OUTPATIENT)
Dept: CT IMAGING | Age: 62
Discharge: HOME | End: 2022-05-31
Payer: MEDICARE

## 2022-05-31 DIAGNOSIS — F17.210 CONTINUOUS DEPENDENCE ON CIGARETTE SMOKING: ICD-10-CM

## 2022-05-31 DIAGNOSIS — D75.1 SECONDARY POLYCYTHEMIA: ICD-10-CM

## 2022-05-31 PROCEDURE — 71271 CT THORAX LUNG CANCER SCR C-: CPT

## 2022-06-02 ENCOUNTER — TELEPHONE (OUTPATIENT)
Dept: ONCOLOGY | Age: 62
End: 2022-06-02

## 2022-06-02 NOTE — TELEPHONE ENCOUNTER
6/2/22 - Dr Charly Zhang needs to see pt next week and asked that we move his July appt to the week of 6/6/22. I called the pt and left a voicemail message with the appt date and time  6/9/22 at 9:15 am and I asked the pt to please call me back to confirm he received the message and had the rescheduled appointment information.

## 2022-06-09 ENCOUNTER — OFFICE VISIT (OUTPATIENT)
Dept: ONCOLOGY | Age: 62
End: 2022-06-09
Payer: MEDICARE

## 2022-06-09 ENCOUNTER — HOSPITAL ENCOUNTER (OUTPATIENT)
Dept: INFUSION THERAPY | Age: 62
Discharge: HOME | End: 2022-06-09

## 2022-06-09 VITALS
DIASTOLIC BLOOD PRESSURE: 71 MMHG | WEIGHT: 216.8 LBS | BODY MASS INDEX: 31.04 KG/M2 | HEIGHT: 70 IN | HEART RATE: 100 BPM | OXYGEN SATURATION: 93 % | RESPIRATION RATE: 20 BRPM | TEMPERATURE: 97.9 F | SYSTOLIC BLOOD PRESSURE: 146 MMHG

## 2022-06-09 DIAGNOSIS — R91.1 LUNG NODULE: Primary | ICD-10-CM

## 2022-06-09 PROCEDURE — 3017F COLORECTAL CA SCREEN DOC REV: CPT | Performed by: INTERNAL MEDICINE

## 2022-06-09 PROCEDURE — 99214 OFFICE O/P EST MOD 30 MIN: CPT | Performed by: INTERNAL MEDICINE

## 2022-06-09 PROCEDURE — 1036F TOBACCO NON-USER: CPT | Performed by: INTERNAL MEDICINE

## 2022-06-09 PROCEDURE — G8427 DOCREV CUR MEDS BY ELIG CLIN: HCPCS | Performed by: INTERNAL MEDICINE

## 2022-06-09 PROCEDURE — G8417 CALC BMI ABV UP PARAM F/U: HCPCS | Performed by: INTERNAL MEDICINE

## 2022-06-09 ASSESSMENT — PATIENT HEALTH QUESTIONNAIRE - PHQ9
2. FEELING DOWN, DEPRESSED OR HOPELESS: 0
SUM OF ALL RESPONSES TO PHQ9 QUESTIONS 1 & 2: 0
SUM OF ALL RESPONSES TO PHQ QUESTIONS 1-9: 0
1. LITTLE INTEREST OR PLEASURE IN DOING THINGS: 0

## 2022-06-09 NOTE — PROGRESS NOTES
Patient Name: Ricarda Martinez  Patient : 1960  Patient MRN: [id number]     Primary Oncologist: Rand Baer MD  Referring Provider: Mitzi Anguiano MD     Date of Service: 2022      Chief Complaint:   Chief Complaint   Patient presents with    Follow-up     Patient Active Problem List:     Shortness of breath     Continuous dependence on cigarette smoking     Secondary polycythemia    HPI:   Mr. Oanh Massey is a 70-year-old very pleasant gentleman with medical history significant for hypertension, hyperlipidemia, diabetes mellitus, COPD, osteoarthritis, obesity, initially referred to me on December 3, 2018 for evaluation of erythrocytosis. Mr. Oanh Massey was found to have erythrocytosis since . He recently established care with primary care physician, Dr. Mary Mclean on 2018. Routine blood tests done on 2018 showed persistent erythrocytosis. He is a chronic smoker and he started smoking since he was 16. He used to smoke ~ 2 PPD before and he is currently smoking about 10 - 15 cigarettes per day. Since he is found to have persistent erythrocytosis, he was subsequently referred to me for further evaluation. He denied high altitude living. He does not have any family history of erythrocytosis. Laboratory work ups done on 12/3/18 showed normal erythropoietin level, normal ESR, LDH, iron study. JAK2 V617F mutation was negative. Abdominal sonogram done on 18 showed no splenomegaly or splenic mass. Limited evaluation liver, though grossly unremarkable. Focal area of gallbladder wall thickening measuring 5.9 mm, though limited with regards to scanning technique per the technologist. The remainder the gallbladder appears unremarkable. Likely clinically insignificant. If clinically concerned, evaluation for cystic duct obstruction could be performed with nuclear medicine imaging.     Low dose screening CT scan of chest done on 3/4/19 showed 0.4 cm solid nodule in the right lower lobe. Sequelae of granulomatous disease. Minimal coronary atherosclerotic calcifications. Continue annual lung screening with LDCT in 12 months. (probability of malignancy <1%). CT lung screening on May 27, 2020 showed stable appearance of chronic bronchitis with nonobstructive right mainstream bronchus dependent secretions. Stable chronic right hemidiaphragm elevation resulting in chronic lung base atelectasis. No acute thoracic abnormality or findings concerning for malignancy. CT lung screening done on May 28, 2021 showed a stable exam.  No acute cardiopulmonary findings or new suspicious pulmonary nodules. Dilated main pulmonary artery which can be seen with pulmonary hypertension. CT lung screening on 5/31/2022 showed improving consolidation in the right lower lobe since May 2021. The nodular opacity measuring 14 mm is seen in the right lower lobe which may represent persistent consolidation/atelectasis versus a pulmonary nodule. Radiologist recommend further evaluation with PET/CT scan. On June 9, 2022, I asked him to see me because of findings on CT lung screening. 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 He stated that his symptoms has improved some with phlebotomy. He has been under observation since then. CT lung screening done on May 31, 2022 was reviewed with him. It showed nodular consolidation in RLL. I agree with radiologist and I recommend him to have PET/CT scan as soon as possible. I recognized that his hematocrit on 1/11/22 was 50.6% and I recommend for observation. He doesn't need phlebotomy this time. If his symptoms improve with phlebotomy, I will recommend to keep his hematocrit less than 52%. He stopped smoking since 1/1/2021. Otherwise, I will see him back in 6 months. He doesn't have any significant symptoms at today visit. Past Medical History  Significant for  1. Hypertension  2. Hyperlipidemia  3. Diabetes mellitus4. COPD  5. Osteoarthritis  6. Obesity    Surgical History  Significant for  1. Cyst removal from his tailbone area. Allergies  No known medication allergies    Social History  He is a former smoker and he quit smoking on 11/2020. He started smoking since he was 14, until 11/2020. He used to smoke 2 pack/day. He denies alcohol drinking and illicit drug abuse. He is currently living in [place] [id number]. Family History  Grandmother - Maternal (2nd Degree): Breast cancer    Oncology History    No history exists. Review of Systems: \"Per interval history; otherwise 10 point ROS is negative. \"  His energy level is stable and appetite is good. His sleep is good. He denies fever, chills, night sweats, cough, shortness of breath, chest pain, hemoptysis or palpitations. His bowel and bladder functions are normal. He doesn't have nausea, vomiting, diarrhea, constipation, dysuria, loss of appetite or weight loss. He denies neuropathy and he doesn't have bleeding or clotting issues. He denies any pain on today visit. No anxiety or depression. The rest of the systems are unremarkable.      Vital Signs:  BP (!) 146/71 (Site: Left Upper Arm, Position: Sitting, Cuff Size: Large Adult)   Pulse 100   Temp 97.9 °F (36.6 °C) (Temporal)   Resp 20   Ht 5' 10\" (1.778 m)   Wt 216 lb 12.8 oz (98.3 kg)   SpO2 93%   BMI 31.11 kg/m²     Physical Exam:  CONSTITUTIONAL: awake, no apparent distress, alert, cooperative,   EYES: pupils equal, round and reactive to light, sclera clear and conjunctiva normal  ENT: Normocephalic, without obvious abnormality, atraumatic  NECK: supple, symmetrical, no jugular venous distension and no carotid bruits   HEMATOLOGIC/LYMPHATIC: no cervical, supraclavicular or axillary lymphadenopathy   LUNGS: VBS, no wheezes, clear to auscultation, no rhonchi, no increased work of breathing, no crackles,  CARDIOVASCULAR: regular rate and rhythm, normal S1 and S2, no murmur noted  ABDOMEN: normal bowel sounds x 4, non-tender, no masses palpated, no hepatosplenomegaly, soft, non-distended,    MUSCULOSKELETAL: full range of motion noted, tone is normal  NEUROLOGIC: awake, alert, oriented to name, place and time. Motor skills grossly intact. SKIN: Normal skin color, texture, turgor and no jaundice.  appears intact   EXTREMITIES: no cyanosis, no leg swelling, no clubbing, no LE edema,     Labs:  Hematology:  Lab Results   Component Value Date    WBC 10.3 01/11/2022    RBC 5.92 01/11/2022    HGB 16.3 01/11/2022    HCT 50.6 01/11/2022    MCV 85.5 01/11/2022    MCH 27.5 01/11/2022    MCHC 32.2 01/11/2022    RDW 16.0 (H) 01/11/2022     01/11/2022    MPV 9.3 01/11/2022    SEGSPCT 67.2 (H) 01/11/2022    EOSRELPCT 4.5 (H) 01/11/2022    BASOPCT 0.7 01/11/2022    LYMPHOPCT 15.1 (L) 01/11/2022    MONOPCT 12.5 (H) 01/11/2022    SEGSABS 7.0 01/11/2022    EOSABS 0.5 01/11/2022    BASOSABS 0.1 01/11/2022    LYMPHSABS 1.6 01/11/2022    MONOSABS 1.3 01/11/2022    DIFFTYPE AUTOMATED DIFFERENTIAL 01/11/2022     Lab Results   Component Value Date    ESR 2 01/11/2022     Chemistry:  Lab Results   Component Value Date     01/11/2022    K 4.9 01/11/2022    CL 94 (L) 01/11/2022    CO2 31 01/11/2022    BUN 13 01/11/2022    CREATININE 0.7 (L) 01/11/2022    GLUCOSE 339 (H) 01/11/2022    CALCIUM 9.2 01/11/2022    PROT 6.9 01/11/2022    LABALBU 4.0 01/11/2022    BILITOT 0.4 01/11/2022    ALKPHOS 86 01/11/2022    AST 12 (L) 01/11/2022    ALT 10 01/11/2022    LABGLOM >60 01/11/2022    GFRAA >60 01/11/2022    MG 2.1 07/03/2018    POCGLU 173 (H) 07/06/2018     Lab Results   Component Value Date     01/11/2022     No components found for: LD  Lab Results   Component Value Date    TSHHS 0.749 07/03/2018     Immunology:  Lab Results   Component Value Date    PROT 6.9 01/11/2022     No results found for: MAYA Jennings  No results found for: B2M  Coagulation Panel:  No results found for: PROTIME, INR, APTT, DDIMER  Anemia Panel:  No results found for: [id number], FOLATE  Tumor Markers:  No results found for: , CEA, , LABCA2, PSA  Observations:  PHQ-9 Total Score: 0 (6/9/2022  9:04 AM)        Assessment & Plan:   Erythrocytosis    PLAN  Mr. Mine Malloy is a 50-year-old very pleasant patient who was found to have persistent erythrocytosis on routine blood tests done on November 8, 2018. Laboratory work ups done on 12/3/18 showed normal erythropoietin level, normal ESR, LDH, iron study. JAK2 V617F mutation was negative. Abdominal sonogram is also negative for erythropoietin producing tumor. Since all the work ups were negative and I believe his erythrocytosis is due to hypoxemia from chronic cigarette smoking. CT lung screening on May 27, 2020 showed stable appearance of chronic bronchitis with nonobstructive right mainstream bronchus dependent secretions. Stable chronic right hemidiaphragm elevation resulting in chronic lung base atelectasis. No acute thoracic abnormality or findings concerning for malignancy. CT lung screening done on May 28, 2021 showed a stable exam.  No acute cardiopulmonary findings or new suspicious pulmonary nodules. Dilated main pulmonary artery which can be seen with pulmonary hypertension. CT lung screening on 5/31/2022 showed improving consolidation in the right lower lobe since May 2021. The nodular opacity measuring 14 mm is seen in the right lower lobe which may represent persistent consolidation/atelectasis versus a pulmonary nodule. Radiologist recommend further evaluation with PET/CT scan. On June 9, 2022, I asked him to see me because of findings on CT lung screening. 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 He stated that his symptoms has improved some with phlebotomy. He has been under observation since then.      CT lung screening done on May 31, 2022 was reviewed with him. It showed nodular consolidation in RLL. I agree with radiologist and I recommend him to have PET/CT scan as soon as possible. I recognized that his hematocrit on 1/11/22 was 50.6% and I recommend for observation. He doesn't need phlebotomy this time. If his symptoms improve with phlebotomy, I will recommend to keep his hematocrit less than 52%. He stopped smoking since 1/1/2021. Otherwise, I will see him back in 6 months. I answered all his questions and concerns for today. I asked him to follow-up with primary care physician on regular basis. Recent imaging and labs were reviewed and discussed with the patient.

## 2022-06-09 NOTE — PROGRESS NOTES
MA Rooming Questions  Patient: Kurt Nunes  MRN: [id number]    Date: 6/9/2022        1. Do you have any new issues?   no         2. Do you need any refills on medications?    no    3. Have you had any imaging done since your last visit?   no    4. Have you been hospitalized or seen in the emergency room since your last visit here?   no    5. Did the patient have a depression screening completed today?  Yes    PHQ-9 Total Score: 0 (6/9/2022  9:04 AM)       PHQ-9 Given to (if applicable):               PHQ-9 Score (if applicable):                     [] Positive     []  Negative              Does question #9 need addressed (if applicable)                     [] Yes    []  No               Jamie Salas, CMA

## 2022-06-20 ENCOUNTER — HOSPITAL ENCOUNTER (OUTPATIENT)
Dept: PET IMAGING | Age: 62
Discharge: HOME | End: 2022-06-20
Payer: MEDICARE

## 2022-06-20 DIAGNOSIS — R91.1 LUNG NODULE: ICD-10-CM

## 2022-06-20 PROCEDURE — 78815 PET IMAGE W/CT SKULL-THIGH: CPT

## 2022-06-20 PROCEDURE — A9552 F18 FDG: HCPCS | Performed by: INTERNAL MEDICINE

## 2022-06-20 PROCEDURE — 2580000003 HC RX 258: Performed by: INTERNAL MEDICINE

## 2022-06-20 PROCEDURE — 3430000000 HC RX DIAGNOSTIC RADIOPHARMACEUTICAL: Performed by: INTERNAL MEDICINE

## 2022-06-20 RX ORDER — SODIUM CHLORIDE 0.9 % (FLUSH) 0.9 %
10 SYRINGE (ML) INJECTION PRN
Status: COMPLETED | OUTPATIENT
Start: 2022-06-20 | End: 2022-06-20

## 2022-06-20 RX ORDER — FLUDEOXYGLUCOSE F 18 200 MCI/ML
15.25 INJECTION, SOLUTION INTRAVENOUS
Status: COMPLETED | OUTPATIENT
Start: 2022-06-20 | End: 2022-06-20

## 2022-06-20 RX ADMIN — SODIUM CHLORIDE, PRESERVATIVE FREE 10 ML: 5 INJECTION INTRAVENOUS at 14:35

## 2022-06-20 RX ADMIN — FLUDEOXYGLUCOSE F 18 15.25 MILLICURIE: 200 INJECTION, SOLUTION INTRAVENOUS at 14:35

## 2022-06-28 ENCOUNTER — HOSPITAL ENCOUNTER (OUTPATIENT)
Dept: INFUSION THERAPY | Age: 62
Discharge: HOME | End: 2022-06-28

## 2022-06-28 ENCOUNTER — OFFICE VISIT (OUTPATIENT)
Dept: ONCOLOGY | Age: 62
End: 2022-06-28
Payer: MEDICARE

## 2022-06-28 VITALS
WEIGHT: 219.2 LBS | DIASTOLIC BLOOD PRESSURE: 65 MMHG | HEIGHT: 70 IN | SYSTOLIC BLOOD PRESSURE: 148 MMHG | TEMPERATURE: 97.7 F | HEART RATE: 91 BPM | OXYGEN SATURATION: 94 % | BODY MASS INDEX: 31.38 KG/M2

## 2022-06-28 DIAGNOSIS — D75.1 SECONDARY POLYCYTHEMIA: Primary | ICD-10-CM

## 2022-06-28 PROCEDURE — 3017F COLORECTAL CA SCREEN DOC REV: CPT | Performed by: INTERNAL MEDICINE

## 2022-06-28 PROCEDURE — 1036F TOBACCO NON-USER: CPT | Performed by: INTERNAL MEDICINE

## 2022-06-28 PROCEDURE — 99213 OFFICE O/P EST LOW 20 MIN: CPT | Performed by: INTERNAL MEDICINE

## 2022-06-28 PROCEDURE — G8427 DOCREV CUR MEDS BY ELIG CLIN: HCPCS | Performed by: INTERNAL MEDICINE

## 2022-06-28 PROCEDURE — G8417 CALC BMI ABV UP PARAM F/U: HCPCS | Performed by: INTERNAL MEDICINE

## 2022-06-28 NOTE — PROGRESS NOTES
Patient Name: Sachin Berman  Patient : 1960  Patient MRN: [id number]     Primary Oncologist: Bhavana Presley MD  Referring Provider: Laurent Hernández MD     Date of Service: 2022      Chief Complaint:   No chief complaint on file. Patient Active Problem List:     Shortness of breath     Continuous dependence on cigarette smoking     Secondary polycythemia    HPI:   Mr. Nu Reynolds is a 44-year-old very pleasant gentleman with medical history significant for hypertension, hyperlipidemia, diabetes mellitus, COPD, osteoarthritis, obesity, initially referred to me on December 3, 2018 for evaluation of erythrocytosis. Mr. Nu Reynolds was found to have erythrocytosis since . He recently established care with primary care physician, Dr. Nola Romero on 2018. Routine blood tests done on 2018 showed persistent erythrocytosis. He is a chronic smoker and he started smoking since he was 16. He used to smoke ~ 2 PPD before and he is currently smoking about 10 - 15 cigarettes per day. Since he is found to have persistent erythrocytosis, he was subsequently referred to me for further evaluation. He denied high altitude living. He does not have any family history of erythrocytosis. Laboratory work ups done on 12/3/18 showed normal erythropoietin level, normal ESR, LDH, iron study. JAK2 V617F mutation was negative. Abdominal sonogram done on 18 showed no splenomegaly or splenic mass. Limited evaluation liver, though grossly unremarkable. Focal area of gallbladder wall thickening measuring 5.9 mm, though limited with regards to scanning technique per the technologist. The remainder the gallbladder appears unremarkable. Likely clinically insignificant. If clinically concerned, evaluation for cystic duct obstruction could be performed with nuclear medicine imaging. Low dose screening CT scan of chest done on 3/4/19 showed 0.4 cm solid nodule in the right lower lobe.  Sequelae of granulomatous disease. Minimal coronary atherosclerotic calcifications. Continue annual lung screening with LDCT in 12 months. (probability of malignancy <1%). CT lung screening on May 27, 2020 showed stable appearance of chronic bronchitis with nonobstructive right mainstream bronchus dependent secretions. Stable chronic right hemidiaphragm elevation resulting in chronic lung base atelectasis. No acute thoracic abnormality or findings concerning for malignancy. CT lung screening done on May 28, 2021 showed a stable exam.  No acute cardiopulmonary findings or new suspicious pulmonary nodules. Dilated main pulmonary artery which can be seen with pulmonary hypertension. CT lung screening on 5/31/2022 showed improving consolidation in the right lower lobe since May 2021. The nodular opacity measuring 14 mm is seen in the right lower lobe which may represent persistent consolidation/atelectasis versus a pulmonary nodule. Radiologist recommend further evaluation with PET/CT scan. PET CT scan done on 6/20/2022 showed no focal activity in the right lower lobe. There was question of a nodule on the recent CT scan. Findings are favored to be benign and related to resolving consolidation with probable atelectasis or scarring. Attention on follow-up imaging. On June 28, 2022, he presented to me for follow up. 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 He stated that his symptoms has improved some with phlebotomy. He has been under observation since then. CT lung screening done on May 31, 2022 was reviewed with him. It showed nodular consolidation in RLL. I reviewed with him findings on PET/CT scan done on 6/20/22. If PET/CT scan findings are favored to be benign process, I recommend him to have repeat CT lung screening in 5/2023. I recognized that his hematocrit on 1/11/22 was 50.6% and I recommend for observation.  He doesn't need phlebotomy this time. If his symptoms improve with phlebotomy, I will recommend to keep his hematocrit less than 52%. He stopped smoking since 1/1/2021. Otherwise, I will see him back in 6 months. He doesn't have any significant symptoms at today visit. Past Medical History  Significant for  1. Hypertension  2. Hyperlipidemia  3. Diabetes mellitus4. COPD  5. Osteoarthritis  6. Obesity    Surgical History  Significant for  1. Cyst removal from his tailbone area. Allergies  No known medication allergies    Social History  He is a former smoker and he quit smoking on 11/2020. He started smoking since he was 14, until 11/2020. He used to smoke 2 pack/day. He denies alcohol drinking and illicit drug abuse. He is currently living in [facility]. Family History  Grandmother - Maternal (2nd Degree): Breast cancer    Oncology History    No history exists. Review of Systems: \"Per interval history; otherwise 10 point ROS is negative. \"  His energy level is stable and appetite is good. His sleep is good. He denies fever, chills, night sweats, cough, shortness of breath, chest pain, hemoptysis or palpitations. His bowel and bladder functions are normal. He doesn't have nausea, vomiting, diarrhea, constipation, dysuria, loss of appetite or weight loss. He denies neuropathy and he doesn't have bleeding or clotting issues. He denies any pain on today visit. Denies anxiety or depression. The rest of the systems are unremarkable. Vital Signs: There were no vitals taken for this visit.     Physical Exam:  CONSTITUTIONAL: awake, no apparent distress, alert, cooperative,   EYES: pupils equal, round and reactive to light, sclera clear and conjunctiva normal  ENT: Normocephalic, without obvious abnormality, atraumatic  NECK: supple, symmetrical, no jugular venous distension and no carotid bruits   HEMATOLOGIC/LYMPHATIC: no cervical, supraclavicular or axillary lymphadenopathy   LUNGS: VBS, no wheezes, clear to auscultation, no rhonchi, no increased work of breathing, no crackles,  CARDIOVASCULAR: regular rate and rhythm, normal S1 and S2, no murmur noted  ABDOMEN: normal bowel sounds x 4, non-tender, no masses palpated, no hepatosplenomegaly, soft, non-distended,    MUSCULOSKELETAL: full range of motion noted, tone is normal  NEUROLOGIC: awake, alert, oriented to name, place and time. Motor skills grossly intact. SKIN: Normal skin color, texture, turgor and no jaundice.  appears intact   EXTREMITIES: no cyanosis, no leg swelling, no clubbing, no LE edema,     Labs:  Hematology:  Lab Results   Component Value Date    WBC 10.3 01/11/2022    RBC 5.92 01/11/2022    HGB 16.3 01/11/2022    HCT 50.6 01/11/2022    MCV 85.5 01/11/2022    MCH 27.5 01/11/2022    MCHC 32.2 01/11/2022    RDW 16.0 (H) 01/11/2022     01/11/2022    MPV 9.3 01/11/2022    SEGSPCT 67.2 (H) 01/11/2022    EOSRELPCT 4.5 (H) 01/11/2022    BASOPCT 0.7 01/11/2022    LYMPHOPCT 15.1 (L) 01/11/2022    MONOPCT 12.5 (H) 01/11/2022    SEGSABS 7.0 01/11/2022    EOSABS 0.5 01/11/2022    BASOSABS 0.1 01/11/2022    LYMPHSABS 1.6 01/11/2022    MONOSABS 1.3 01/11/2022    DIFFTYPE AUTOMATED DIFFERENTIAL 01/11/2022     Lab Results   Component Value Date    ESR 2 01/11/2022     Chemistry:  Lab Results   Component Value Date     01/11/2022    K 4.9 01/11/2022    CL 94 (L) 01/11/2022    CO2 31 01/11/2022    BUN 13 01/11/2022    CREATININE 0.7 (L) 01/11/2022    GLUCOSE 339 (H) 01/11/2022    CALCIUM 9.2 01/11/2022    PROT 6.9 01/11/2022    LABALBU 4.0 01/11/2022    BILITOT 0.4 01/11/2022    ALKPHOS 86 01/11/2022    AST 12 (L) 01/11/2022    ALT 10 01/11/2022    LABGLOM >60 01/11/2022    GFRAA >60 01/11/2022    MG 2.1 07/03/2018    POCGLU 173 (H) 07/06/2018     Lab Results   Component Value Date     01/11/2022     No components found for: LD  Lab Results   Component Value Date    TSHHS 0.749 07/03/2018     Immunology:  Lab Results   Component Value Date    PROT 6.9 01/11/2022     No results found for: Elizabeth Cox, KLFLCR  No results found for: B2M  Coagulation Panel:  No results found for: PROTIME, INR, APTT, DDIMER  Anemia Panel:  No results found for: [id number], FOLATE  Tumor Markers:  No results found for: , CEA, , LABCA2, PSA  Observations:  No data recorded        Assessment & Plan:   Erythrocytosis    PLAN  Mr. Nu Reynolds is a 71-year-old very pleasant patient who was found to have persistent erythrocytosis on routine blood tests done on November 8, 2018. Laboratory work ups done on 12/3/18 showed normal erythropoietin level, normal ESR, LDH, iron study. JAK2 V617F mutation was negative. Abdominal sonogram is also negative for erythropoietin producing tumor. Since all the work ups were negative and I believe his erythrocytosis is due to hypoxemia from chronic cigarette smoking. CT lung screening on May 27, 2020 showed stable appearance of chronic bronchitis with nonobstructive right mainstream bronchus dependent secretions. Stable chronic right hemidiaphragm elevation resulting in chronic lung base atelectasis. No acute thoracic abnormality or findings concerning for malignancy. CT lung screening done on May 28, 2021 showed a stable exam.  No acute cardiopulmonary findings or new suspicious pulmonary nodules. Dilated main pulmonary artery which can be seen with pulmonary hypertension. PET CT scan done on 6/20/2022 showed no focal activity in the right lower lobe. There was question of a nodule on the recent CT scan. Findings are favored to be benign and related to resolving consolidation with probable atelectasis or scarring. Attention on follow-up imaging. On June 28, 2022, he presented to me for follow up. 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 He stated that his symptoms has improved some with phlebotomy.  He has been under observation since then. CT lung screening done on May 31, 2022 was reviewed with him. It showed nodular consolidation in RLL. I reviewed with him findings on PET/CT scan done on 6/20/22. If PET/CT scan findings are favored to be benign process, I recommend him to have repeat CT lung screening in 5/2023. I recognized that his hematocrit on 1/11/22 was 50.6% and I recommend for observation. He doesn't need phlebotomy this time. If his symptoms improve with phlebotomy, I will recommend to keep his hematocrit less than 52%. He stopped smoking since 1/1/2021. Otherwise, I will see him back in 6 months. I answered all his questions and concerns for today. I asked him to follow-up with primary care physician on regular basis. Recent imaging and labs were reviewed and discussed with the patient.

## 2022-06-28 NOTE — PROGRESS NOTES
MA Rooming Questions  Patient: Simin Braxton  MRN: [id number]    Date: 6/28/2022        1. Do you have any new issues?   no         2. Do you need any refills on medications?    no    3. Have you had any imaging done since your last visit? Yes - PET scan    4. Have you been hospitalized or seen in the emergency room since your last visit here?   no    5. Did the patient have a depression screening completed today?  No    No data recorded     PHQ-9 Given to (if applicable):               PHQ-9 Score (if applicable):                     [] Positive     []  Negative              Does question #9 need addressed (if applicable)                     [] Yes    []  No               Carmelita Jenkins CMA

## 2022-06-30 ENCOUNTER — OFFICE VISIT (OUTPATIENT)
Dept: PULMONOLOGY | Age: 62
End: 2022-06-30
Payer: MEDICARE

## 2022-06-30 VITALS
BODY MASS INDEX: 31.5 KG/M2 | HEART RATE: 94 BPM | WEIGHT: 220 LBS | OXYGEN SATURATION: 94 % | HEIGHT: 70 IN | DIASTOLIC BLOOD PRESSURE: 74 MMHG | SYSTOLIC BLOOD PRESSURE: 122 MMHG

## 2022-06-30 DIAGNOSIS — F17.210 CONTINUOUS DEPENDENCE ON CIGARETTE SMOKING: ICD-10-CM

## 2022-06-30 DIAGNOSIS — J96.11 CHRONIC HYPOXEMIC RESPIRATORY FAILURE (HCC): ICD-10-CM

## 2022-06-30 DIAGNOSIS — J44.9 COPD, SEVERE (HCC): Primary | ICD-10-CM

## 2022-06-30 DIAGNOSIS — R06.02 SHORTNESS OF BREATH: ICD-10-CM

## 2022-06-30 PROBLEM — Z87.891 FORMER SMOKER: Status: RESOLVED | Noted: 2022-01-17 | Resolved: 2022-06-30

## 2022-06-30 PROCEDURE — 1036F TOBACCO NON-USER: CPT | Performed by: INTERNAL MEDICINE

## 2022-06-30 PROCEDURE — G8427 DOCREV CUR MEDS BY ELIG CLIN: HCPCS | Performed by: INTERNAL MEDICINE

## 2022-06-30 PROCEDURE — 3023F SPIROM DOC REV: CPT | Performed by: INTERNAL MEDICINE

## 2022-06-30 PROCEDURE — 99213 OFFICE O/P EST LOW 20 MIN: CPT | Performed by: INTERNAL MEDICINE

## 2022-06-30 PROCEDURE — 3017F COLORECTAL CA SCREEN DOC REV: CPT | Performed by: INTERNAL MEDICINE

## 2022-06-30 PROCEDURE — G8417 CALC BMI ABV UP PARAM F/U: HCPCS | Performed by: INTERNAL MEDICINE

## 2022-06-30 NOTE — PROGRESS NOTES
SUBJECTIVE:  Chief Complaint: Severe COPD, chronic hypoxemic respiratory failure, shortness of breath, tobacco abuse, history of erythrocytosis  Mr. Jared Wong states that his insurance would not pay for Symbicort or other LABA/LAMA, LABA/ICS combination medications so he is simply using an albuterol rescue inhaler as needed. He is on oxygen 24 hours a day. He continues to smoke less than a pack of cigarettes per day and has been advised to stop smoking on multiple occasions. He has had no recent COVID-19 infection and is received all 3 Pfizer COVID-19 vaccinations. He also denies any recent episodes of bronchitis and denies worsening shortness of breath or chest discomfort. ROS:  Constitution:  HEENT: Negative for ear, throat pain  Cardiovascular: Negative for chest pain, syncope, edema  Pulmonary: See HPI  Musculoskeletal: Negative for DVT, myalgias, arthralgias    OBJECTIVE:  /74   Pulse 94   Ht 5' 10\" (1.778 m)   Wt 220 lb (99.8 kg)   SpO2 94% Comment: 2 liters  BMI 31.57 kg/m²      Physical Exam:  Constitutional:  He appears well developed and well-nourished. Wearing nasal oxygen. In no respiratory distress at rest  Neck:  Supple, No palpable lymphadenopathy, No JVD  Cardiovascular:  S1, S2 Normal, Regular rhythm, no murmurs or gallops, No pericardial  rubs. Pulmonary: Diminished but otherwise clear breath sounds throughout all lung fields without wheezing or rhonchi  Abdomen: Not examined  Extremities: no edema, No DVT  Neurologic: Oriented x3, No focal deficits    Radiology: Low-dose CT lung screening 5/31/2022  1. Improving consolidation in the right lower lobe since May 2021.  The   nodular opacity measuring 14 mm is seen in the right lower lobe which may   represent persistent consolidation/atelectasis versus a pulmonary nodule. Further evaluation with PET/CT is recommended. 2. Dilated main pulmonary artery which can be seen in pulmonary hypertension.        PFT: PET/CT 6/20/2022 No focal activity in the right lower lobe where there was question of a   nodule on the recent CT scan.  Findings are favored to be benign related to   resolving consolidation with probable atelectasis or scarring.  Attention on   follow-up imaging. ASSESSMENT:    1. COPD, severe ([address]    2. Chronic hypoxemic respiratory failure (HCC)    3. Former smoker    4. Shortness of breath          PLAN:   I did urged him to stop smoking completely. I gave him a list of LABA/ICS combination drugs along with a list of LABA/LAMA inhaled medications. He will check with his new insurance to see if any of these are affordable and hopefully he can get on maintenance bronchodilator therapy. Because of my anticipated prison later this summer I will ask Dr. Jewels Martinez to continue to follow his pulmonary issues and renew his inhaled bronchodilators as needed. If necessary she can refer him back to [facility] pulmonary at a later date. We have discussed the need to maintain yearly flu immunization, pneumococcal vaccination. We have discussed Coronavirus precaution including handwashing practice, wiping items touched in public such as gas pumps, door handles, shopping carts, etc. Self monitoring for infection - fever, chills, cough, SOB. Should they develop symptoms they should call office for further instructions. No follow-ups on file. This dictation was performed with a verbal recognition program and it was checked for errors. It is possible that there are still dictated errors within this office note. Any errors should be brought immediately to my attention for correction. All efforts were made to ensure that this office note is accurate.

## 2022-07-14 ENCOUNTER — TELEPHONE (OUTPATIENT)
Dept: PULMONOLOGY | Age: 62
End: 2022-07-14

## 2022-07-14 NOTE — TELEPHONE ENCOUNTER
Pt stated that these 2 medications, Trelegy and Anoro, were approved by his insurance company when he called to check. Pt also stated that Dr. Cristiano Cabello gave him a list of medications to call  and verify with. Please Advise.

## 2022-07-15 ENCOUNTER — TELEPHONE (OUTPATIENT)
Dept: PULMONOLOGY | Age: 62
End: 2022-07-15

## 2022-07-18 ENCOUNTER — OFFICE VISIT (OUTPATIENT)
Dept: FAMILY MEDICINE CLINIC | Age: 62
End: 2022-07-18
Payer: COMMERCIAL

## 2022-07-18 ENCOUNTER — TELEPHONE (OUTPATIENT)
Dept: PULMONOLOGY | Age: 62
End: 2022-07-18

## 2022-07-18 ENCOUNTER — SCHEDULED TELEPHONE ENCOUNTER (OUTPATIENT)
Dept: PULMONOLOGY | Age: 62
End: 2022-07-18
Payer: COMMERCIAL

## 2022-07-18 VITALS
OXYGEN SATURATION: 91 % | SYSTOLIC BLOOD PRESSURE: 124 MMHG | DIASTOLIC BLOOD PRESSURE: 80 MMHG | BODY MASS INDEX: 31.02 KG/M2 | WEIGHT: 216.2 LBS | HEART RATE: 93 BPM

## 2022-07-18 DIAGNOSIS — J44.9 COPD, SEVERE (HCC): ICD-10-CM

## 2022-07-18 DIAGNOSIS — J96.11 CHRONIC HYPOXEMIC RESPIRATORY FAILURE (HCC): ICD-10-CM

## 2022-07-18 DIAGNOSIS — J44.9 COPD, SEVERE (HCC): Primary | ICD-10-CM

## 2022-07-18 DIAGNOSIS — E11.65 UNCONTROLLED TYPE 2 DIABETES MELLITUS WITH HYPERGLYCEMIA (HCC): Primary | ICD-10-CM

## 2022-07-18 DIAGNOSIS — R06.02 SHORTNESS OF BREATH: ICD-10-CM

## 2022-07-18 DIAGNOSIS — F17.210 CONTINUOUS DEPENDENCE ON CIGARETTE SMOKING: ICD-10-CM

## 2022-07-18 PROCEDURE — 3023F SPIROM DOC REV: CPT | Performed by: FAMILY MEDICINE

## 2022-07-18 PROCEDURE — 99214 OFFICE O/P EST MOD 30 MIN: CPT | Performed by: FAMILY MEDICINE

## 2022-07-18 PROCEDURE — 3017F COLORECTAL CA SCREEN DOC REV: CPT | Performed by: FAMILY MEDICINE

## 2022-07-18 PROCEDURE — 83036 HEMOGLOBIN GLYCOSYLATED A1C: CPT | Performed by: FAMILY MEDICINE

## 2022-07-18 PROCEDURE — 99442 PR PHYS/QHP TELEPHONE EVALUATION 11-20 MIN: CPT | Performed by: INTERNAL MEDICINE

## 2022-07-18 PROCEDURE — G8427 DOCREV CUR MEDS BY ELIG CLIN: HCPCS | Performed by: FAMILY MEDICINE

## 2022-07-18 PROCEDURE — G8417 CALC BMI ABV UP PARAM F/U: HCPCS | Performed by: FAMILY MEDICINE

## 2022-07-18 PROCEDURE — 3052F HG A1C>EQUAL 8.0%<EQUAL 9.0%: CPT | Performed by: FAMILY MEDICINE

## 2022-07-18 PROCEDURE — 1036F TOBACCO NON-USER: CPT | Performed by: FAMILY MEDICINE

## 2022-07-18 PROCEDURE — 2022F DILAT RTA XM EVC RTNOPTHY: CPT | Performed by: FAMILY MEDICINE

## 2022-07-18 RX ORDER — GLIMEPIRIDE 2 MG/1
TABLET ORAL
Qty: 180 TABLET | Refills: 3 | Status: SHIPPED | OUTPATIENT
Start: 2022-07-18

## 2022-07-18 RX ORDER — UMECLIDINIUM BROMIDE AND VILANTEROL TRIFENATATE 62.5; 25 UG/1; UG/1
1 POWDER RESPIRATORY (INHALATION) DAILY
Qty: 1 EACH | Refills: 11 | Status: SHIPPED | OUTPATIENT
Start: 2022-07-18

## 2022-07-18 RX ORDER — GLUCOSAMINE HCL/CHONDROITIN SU 500-400 MG
CAPSULE ORAL
Qty: 100 STRIP | Refills: 5 | Status: SHIPPED | OUTPATIENT
Start: 2022-07-18

## 2022-07-18 RX ORDER — LANCETS 30 GAUGE
EACH MISCELLANEOUS
Qty: 100 EACH | Refills: 5 | Status: SHIPPED | OUTPATIENT
Start: 2022-07-18

## 2022-07-18 ASSESSMENT — ENCOUNTER SYMPTOMS
COUGH: 0
BACK PAIN: 0
SHORTNESS OF BREATH: 0

## 2022-07-18 NOTE — PROGRESS NOTES
Michael Dumont Maryellen  23    Chief Complaint   Patient presents with    Diabetes     Patient here for diabetes F/U           Here for a 6-month follow-up regarding his diabetes, is taking metformin 1000 mg twice a day and he does take the glimepiride 1 mg daily, patient does not watch his diet and he does not check his blood sugar every day. He does eat a lot of sweets and candy. Past Medical History:   Diagnosis Date    Acute on chronic respiratory failure with hypoxemia ([facility] 2022    Arthritis     Cataract     Chronic hypoxemic respiratory failure ([facility] [address] 2022    COPD (chronic obstructive pulmonary disease) ([facility]     COPD, severe ([facility] [address] 2022    Diabetes mellitus ([facility]     Enlarged prostate     Former smoker 2022    Hyperlipidemia     Hypertension      Past Surgical History:   Procedure Laterality Date    COLONOSCOPY      SKIN BIOPSY      TONSILLECTOMY       No family history on file.   Social History     Socioeconomic History    Marital status: Single     Spouse name: Not on file    Number of children: Not on file    Years of education: Not on file    Highest education level: Not on file   Occupational History    Not on file   Tobacco Use    Smoking status: Former     Packs/day: 0.50     Years: 47.00     Pack years: 23.50     Types: Cigarettes     Start date: 1973     Quit date: 2021     Years since quittin.5    Smokeless tobacco: Never   Substance and Sexual Activity    Alcohol use: No    Drug use: No    Sexual activity: Not on file     Comment: deferred   Other Topics Concern    Not on file   Social History Narrative    Not on file     Social Determinants of Health     Financial Resource Strain: Not on file   Food Insecurity: Not on file   Transportation Needs: Not on file   Physical Activity: Not on file   Stress: Not on file   Social Connections: Not on file   Intimate Partner Violence: Not on file   Housing Stability: Not on file       No Known Allergies  Current Outpatient Medications   Medication Sig Dispense Refill    umeclidinium-vilanterol (ANORO ELLIPTA) 62.5-25 MCG/INH AEPB inhaler Inhale 1 puff into the lungs in the morning. 1 each 11    blood glucose monitor strips Test 3 times a day & as needed for symptoms of irregular blood glucose. Please fill with what is coverd by insurance 100 strip 5    Lancets MISC Use one lancet 4 times daily 100 each 5    glimepiride (AMARYL) 2 MG tablet TAKE 1 TABLET BY MOUTH TWICE A  tablet 3    metFORMIN (GLUCOPHAGE-XR) 500 MG extended release tablet TAKE 2 TABLETS BY MOUTH TWICE A  tablet 1    albuterol sulfate HFA (VENTOLIN HFA) 108 (90 Base) MCG/ACT inhaler Inhale 2 puffs into the lungs every 6 hours as needed for Wheezing 1 each 11    blood glucose monitor strips Test 3-4 times a day & as needed for symptoms of irregular blood glucose. 100 strip 5    blood glucose monitor kit and supplies Use 3-4 times daily 1 kit 0    albuterol (PROVENTIL) (2.5 MG/3ML) 0.083% nebulizer solution Take 3 mLs by nebulization every 6 hours as needed for Wheezing 120 each 3     No current facility-administered medications for this visit. Review of Systems   Constitutional:  Negative for activity change, appetite change, chills, fatigue and fever. HENT:  Negative for congestion. Respiratory:  Negative for cough and shortness of breath. Cardiovascular:  Negative for chest pain and leg swelling. Musculoskeletal:  Negative for back pain. Skin:  Negative for rash. Neurological:  Negative for dizziness and headaches. Psychiatric/Behavioral:  Negative for agitation, behavioral problems and sleep disturbance. The patient is not nervous/anxious.       Lab Results   Component Value Date    WBC 10.3 01/11/2022    HGB 16.3 01/11/2022    HCT 50.6 01/11/2022    MCV 85.5 01/11/2022     01/11/2022     Lab Results   Component Value Date     01/11/2022    K 4.9 01/11/2022    CL 94 (L) 01/11/2022    CO2 31 01/11/2022    BUN 13 01/11/2022    CREATININE 0.7 (L) 01/11/2022    GLUCOSE 339 (H) 01/11/2022    CALCIUM 9.2 01/11/2022    PROT 6.9 01/11/2022    LABALBU 4.0 01/11/2022    BILITOT 0.4 01/11/2022    ALKPHOS 86 01/11/2022    AST 12 (L) 01/11/2022    ALT 10 01/11/2022    LABGLOM >60 01/11/2022    GFRAA >60 01/11/2022     Lab Results   Component Value Date    CHOL 151 01/17/2022    CHOL 110 07/03/2018     Lab Results   Component Value Date    TRIG 107 01/17/2022    TRIG 62 07/03/2018     Lab Results   Component Value Date    HDL 43 01/17/2022    HDL 38 (L) 07/03/2018     Lab Results   Component Value Date    LDLCALC 87 01/17/2022     Lab Results   Component Value Date    LABA1C 8.1 01/17/2022     Lab Results   Component Value Date    TSHHS 0.749 07/03/2018         /80 (Site: Left Upper Arm, Position: Sitting, Cuff Size: Large Adult)   Pulse 93   Wt 216 lb 3.2 oz (98.1 kg)   SpO2 91%   BMI 31.02 kg/m²     BP Readings from Last 3 Encounters:   07/18/22 124/80   06/30/22 122/74   06/28/22 (!) 148/65       Wt Readings from Last 3 Encounters:   07/18/22 216 lb 3.2 oz (98.1 kg)   06/30/22 220 lb (99.8 kg)   06/28/22 219 lb 3.2 oz (99.4 kg)         Physical Exam  Constitutional:       General: He is not in acute distress. Appearance: Normal appearance. He is well-developed. He is not ill-appearing or diaphoretic. HENT:      Head: Normocephalic and atraumatic. Eyes:      General: No scleral icterus. Pupils: Pupils are equal, round, and reactive to light. Cardiovascular:      Rate and Rhythm: Normal rate and regular rhythm. Heart sounds: Normal heart sounds. No murmur heard. Pulmonary:      Effort: Pulmonary effort is normal.      Breath sounds: Normal breath sounds. No wheezing. Musculoskeletal:         General: Normal range of motion. Cervical back: Normal range of motion and neck supple. No rigidity. Right lower leg: No edema. Left lower leg: No edema.    Neurological: General: No focal deficit present. Mental Status: He is alert and oriented to person, place, and time. Psychiatric:         Mood and Affect: Mood normal.         Behavior: Behavior normal.       ASSESSMENT/ PLAN:    1. Uncontrolled type 2 diabetes mellitus with hyperglycemia (HCC)  -A1c went up from 8.1-10.1, patient does not watching his diet and he does take metformin 1000 twice daily and he does take the glimepiride 1 mg once a day so we will increase his glimepiride to 2 mg a twice a day and he will start watching his diet patient refused the insulin  - blood glucose monitor strips; Test 3 times a day & as needed for symptoms of irregular blood glucose. Please fill with what is coverd by insurance  Dispense: 100 strip; Refill: 5  - Lancets MISC; Use one lancet 4 times daily  Dispense: 100 each; Refill: 5  - POCT glycosylated hemoglobin (Hb A1C)  - glimepiride (AMARYL) 2 MG tablet; TAKE 1 TABLET BY MOUTH TWICE A DAY  Dispense: 180 tablet; Refill: 3    2. COPD, severe ([address]  -He does follow-up with the pulmonologist and he is on nasal cannula oxygen 2 L. Patient going to have the colonoscopy with Dr. Jayla Melton        - All old blood work reviewed with the patient  - Appropriate prescription are addressed. - After visit gretay provided. - Questions answered and patient verbalizes understanding.  - Call for any problem, questions, or concerns. Return in about 3 months (around 10/18/2022).

## 2022-07-18 NOTE — PROGRESS NOTES
[facility])     Enlarged prostate     Former smoker 1/17/2022    Hyperlipidemia     Hypertension        Medication and allergies have been reviewed    Social and family history are unchanged since last visit    ROS:   Constitutional: No fever, no chills   Cardiovascular: No chest pain, no palpitations. Pulmonary: Mild nonproductive cough, mild to mod SOB, no wheeze    Physical exam not complete due to telephone visit  Alert and oriented  No conversational dyspnea, no cough, no audible wheeze  Normal mentation       Diagnosis:    ICD-10-CM    1. COPD, severe ([facility] [address]  J44.9       2. Chronic hypoxemic respiratory failure (HCC)  J96.11       3. Shortness of breath  R06.02       4. Continuous dependence on cigarette smoking  F17.210              Plan:  I discussed with Herminio Alonso about starting on Anoro elliptica 1 inhalation daily. Most insurance companies will not pay for Trelegy until he is failed on a long-acting bronchodilator combination drug like a LABA/ICS or LAMA/LABA. I will have him use Anoro for the next month and if he is doing well we can continue that. If he still is having issues with worsening shortness of breath and chest congestion we can consider starting him on Trelegy at that point. I will continue to follow him.         ---While he has been  vaccinated at this time for COVID-19 I strongly recommend that he continue Coronavirus precaution including: Wearing mask when in public and when in contact with persons who have not been immunized, social distancing when needing to be in public, handwashing practice, wiping items touched in public such as gas pumps, door handles, shopping carts, etc.  --Recommend yearly flu vaccination  --Recommend Covid 19 booster when available  --Recommend Pneumovax vaccination  --Have discussed signs and symptoms of acute exacerbation and why it is important to monitor for bronchial infections.   To call office should he develop signs of acute exacerbation/bronchial infection  --I will continue to follow in pulmonary clinic      I affirm this is a Patient initiated episode with an established patient who has not had a related appointment within my department in the past 7 days or scheduled within the next 24 hours. I have spent 15 minutes reviewing previous notes, test results and communicating with patient discussing the diagnosis and importance of treatment plan.     Note: not billable if this call serves to triage the patient into an appointment for the relevant concern      Cresencio Baldwin MD

## 2022-07-28 ENCOUNTER — TELEPHONE (OUTPATIENT)
Dept: FAMILY MEDICINE CLINIC | Age: 62
End: 2022-07-28

## 2022-07-28 NOTE — TELEPHONE ENCOUNTER
Patient needing specific brand of lancet and test strips sent to pharmacy to work with his machine. Needing the One Touch Ultra Strips and Lancet sent to [address].

## 2022-07-29 NOTE — TELEPHONE ENCOUNTER
The pharmacy did not. They filled the script for a completely different brand then what meter he uses. That's why he asked for the script to be resent with the specific brand previously mentioned.

## 2022-10-05 ENCOUNTER — COMMUNITY OUTREACH (OUTPATIENT)
Dept: FAMILY MEDICINE CLINIC | Age: 62
End: 2022-10-05

## 2022-10-14 DIAGNOSIS — E11.65 UNCONTROLLED TYPE 2 DIABETES MELLITUS WITH HYPERGLYCEMIA (HCC): ICD-10-CM

## 2022-10-17 RX ORDER — METFORMIN HYDROCHLORIDE 500 MG/1
TABLET, EXTENDED RELEASE ORAL
Qty: 360 TABLET | Refills: 1 | Status: SHIPPED | OUTPATIENT
Start: 2022-10-17

## 2022-12-16 ENCOUNTER — HOSPITAL ENCOUNTER (EMERGENCY)
Age: 62
Discharge: HOME | End: 2022-12-16
Attending: EMERGENCY MEDICINE
Payer: COMMERCIAL

## 2022-12-16 ENCOUNTER — APPOINTMENT (OUTPATIENT)
Dept: GENERAL RADIOLOGY | Age: 62
End: 2022-12-16
Payer: COMMERCIAL

## 2022-12-16 VITALS
TEMPERATURE: 98 F | OXYGEN SATURATION: 90 % | DIASTOLIC BLOOD PRESSURE: 87 MMHG | HEART RATE: 99 BPM | RESPIRATION RATE: 20 BRPM | SYSTOLIC BLOOD PRESSURE: 140 MMHG

## 2022-12-16 DIAGNOSIS — T20.20XA PARTIAL THICKNESS BURN OF FACE, INITIAL ENCOUNTER: Primary | ICD-10-CM

## 2022-12-16 PROCEDURE — 71045 X-RAY EXAM CHEST 1 VIEW: CPT

## 2022-12-16 PROCEDURE — 99283 EMERGENCY DEPT VISIT LOW MDM: CPT

## 2022-12-16 RX ORDER — AMOXICILLIN AND CLAVULANATE POTASSIUM 875; 125 MG/1; MG/1
1 TABLET, FILM COATED ORAL 2 TIMES DAILY
Qty: 14 TABLET | Refills: 0 | Status: SHIPPED | OUTPATIENT
Start: 2022-12-16 | End: 2022-12-23
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
## 2022-12-19 ENCOUNTER — OFFICE VISIT (OUTPATIENT)
Dept: FAMILY MEDICINE CLINIC | Age: 62
End: 2022-12-19
Payer: COMMERCIAL

## 2022-12-19 ENCOUNTER — CARE COORDINATION (OUTPATIENT)
Dept: CARE COORDINATION | Age: 62
End: 2022-12-19

## 2022-12-19 VITALS
OXYGEN SATURATION: 90 % | DIASTOLIC BLOOD PRESSURE: 80 MMHG | HEART RATE: 80 BPM | HEIGHT: 69 IN | BODY MASS INDEX: 31.84 KG/M2 | WEIGHT: 215 LBS | SYSTOLIC BLOOD PRESSURE: 140 MMHG

## 2022-12-19 DIAGNOSIS — T20.10XD SUPERFICIAL BURN OF FACE, SUBSEQUENT ENCOUNTER: Primary | ICD-10-CM

## 2022-12-19 PROCEDURE — G8427 DOCREV CUR MEDS BY ELIG CLIN: HCPCS | Performed by: FAMILY MEDICINE

## 2022-12-19 PROCEDURE — G8417 CALC BMI ABV UP PARAM F/U: HCPCS | Performed by: FAMILY MEDICINE

## 2022-12-19 PROCEDURE — 99213 OFFICE O/P EST LOW 20 MIN: CPT | Performed by: FAMILY MEDICINE

## 2022-12-19 PROCEDURE — 3017F COLORECTAL CA SCREEN DOC REV: CPT | Performed by: FAMILY MEDICINE

## 2022-12-19 PROCEDURE — G8484 FLU IMMUNIZE NO ADMIN: HCPCS | Performed by: FAMILY MEDICINE

## 2022-12-19 PROCEDURE — 1036F TOBACCO NON-USER: CPT | Performed by: FAMILY MEDICINE

## 2022-12-19 SDOH — ECONOMIC STABILITY: FOOD INSECURITY: WITHIN THE PAST 12 MONTHS, THE FOOD YOU BOUGHT JUST DIDN'T LAST AND YOU DIDN'T HAVE MONEY TO GET MORE.: PATIENT DECLINED

## 2022-12-19 SDOH — ECONOMIC STABILITY: FOOD INSECURITY: WITHIN THE PAST 12 MONTHS, YOU WORRIED THAT YOUR FOOD WOULD RUN OUT BEFORE YOU GOT MONEY TO BUY MORE.: PATIENT DECLINED

## 2022-12-19 ASSESSMENT — SOCIAL DETERMINANTS OF HEALTH (SDOH): HOW HARD IS IT FOR YOU TO PAY FOR THE VERY BASICS LIKE FOOD, HOUSING, MEDICAL CARE, AND HEATING?: PATIENT DECLINED
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
## 2022-12-19 NOTE — CARE COORDINATION
Attempted to reach patient for ACM follow up; potential enrollment. No answer to phone. Message left with ACM contact information and request for call back. Introduction letter sent via My Chart.
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
## 2022-12-20 ENCOUNTER — CARE COORDINATION (OUTPATIENT)
Dept: CARE COORDINATION | Age: 62
End: 2022-12-20

## 2022-12-20 ASSESSMENT — ENCOUNTER SYMPTOMS
COUGH: 0
SHORTNESS OF BREATH: 0

## 2022-12-21 ENCOUNTER — CARE COORDINATION (OUTPATIENT)
Dept: CARE COORDINATION | Age: 62
End: 2022-12-21

## 2022-12-21 DIAGNOSIS — E11.65 UNCONTROLLED TYPE 2 DIABETES MELLITUS WITH HYPERGLYCEMIA (HCC): ICD-10-CM

## 2022-12-21 DIAGNOSIS — J44.9 COPD, SEVERE (HCC): Primary | ICD-10-CM

## 2022-12-21 SDOH — HEALTH STABILITY: MENTAL HEALTH: HOW OFTEN DO YOU HAVE A DRINK CONTAINING ALCOHOL?: PATIENT DECLINED

## 2022-12-21 SDOH — SOCIAL STABILITY: SOCIAL NETWORK: IN A TYPICAL WEEK, HOW MANY TIMES DO YOU TALK ON THE PHONE WITH FAMILY, FRIENDS, OR NEIGHBORS?: TWICE A WEEK

## 2022-12-21 SDOH — SOCIAL STABILITY: SOCIAL NETWORK: HOW OFTEN DO YOU GET TOGETHER WITH FRIENDS OR RELATIVES?: ONCE A WEEK

## 2022-12-21 SDOH — SOCIAL STABILITY: SOCIAL NETWORK: HOW OFTEN DO YOU ATTEND CHURCH OR RELIGIOUS SERVICES?: NEVER

## 2022-12-21 SDOH — ECONOMIC STABILITY: FOOD INSECURITY: WITHIN THE PAST 12 MONTHS, THE FOOD YOU BOUGHT JUST DIDN'T LAST AND YOU DIDN'T HAVE MONEY TO GET MORE.: SOMETIMES TRUE

## 2022-12-21 SDOH — ECONOMIC STABILITY: INCOME INSECURITY: HOW HARD IS IT FOR YOU TO PAY FOR THE VERY BASICS LIKE FOOD, HOUSING, MEDICAL CARE, AND HEATING?: SOMEWHAT HARD

## 2022-12-21 SDOH — ECONOMIC STABILITY: INCOME INSECURITY: IN THE LAST 12 MONTHS, WAS THERE A TIME WHEN YOU WERE NOT ABLE TO PAY THE MORTGAGE OR RENT ON TIME?: NO

## 2022-12-21 SDOH — ECONOMIC STABILITY: TRANSPORTATION INSECURITY
IN THE PAST 12 MONTHS, HAS THE LACK OF TRANSPORTATION KEPT YOU FROM MEDICAL APPOINTMENTS OR FROM GETTING MEDICATIONS?: NO

## 2022-12-21 SDOH — SOCIAL STABILITY: SOCIAL NETWORK
DO YOU BELONG TO ANY CLUBS OR ORGANIZATIONS SUCH AS CHURCH GROUPS UNIONS, FRATERNAL OR ATHLETIC GROUPS, OR SCHOOL GROUPS?: NO

## 2022-12-21 SDOH — ECONOMIC STABILITY: HOUSING INSECURITY: IN THE LAST 12 MONTHS, HOW MANY PLACES HAVE YOU LIVED?: 1

## 2022-12-21 SDOH — ECONOMIC STABILITY: HOUSING INSECURITY
IN THE LAST 12 MONTHS, WAS THERE A TIME WHEN YOU DID NOT HAVE A STEADY PLACE TO SLEEP OR SLEPT IN A SHELTER (INCLUDING NOW)?: NO

## 2022-12-21 SDOH — ECONOMIC STABILITY: TRANSPORTATION INSECURITY
IN THE PAST 12 MONTHS, HAS LACK OF TRANSPORTATION KEPT YOU FROM MEETINGS, WORK, OR FROM GETTING THINGS NEEDED FOR DAILY LIVING?: NO

## 2022-12-21 SDOH — ECONOMIC STABILITY: FOOD INSECURITY: WITHIN THE PAST 12 MONTHS, YOU WORRIED THAT YOUR FOOD WOULD RUN OUT BEFORE YOU GOT MONEY TO BUY MORE.: SOMETIMES TRUE

## 2022-12-21 SDOH — SOCIAL STABILITY: SOCIAL NETWORK: HOW OFTEN DO YOU ATTENT MEETINGS OF THE CLUB OR ORGANIZATION YOU BELONG TO?: NEVER

## 2022-12-21 SDOH — HEALTH STABILITY: MENTAL HEALTH
STRESS IS WHEN SOMEONE FEELS TENSE, NERVOUS, ANXIOUS, OR CAN'T SLEEP AT NIGHT BECAUSE THEIR MIND IS TROUBLED. HOW STRESSED ARE YOU?: ONLY A LITTLE

## 2022-12-21 SDOH — HEALTH STABILITY: MENTAL HEALTH: HOW MANY STANDARD DRINKS CONTAINING ALCOHOL DO YOU HAVE ON A TYPICAL DAY?: PATIENT DECLINED

## 2022-12-21 NOTE — CARE COORDINATION
Ambulatory Care Coordination Note  12/21/2022    ACC: Gil Gardienr, RN      Spoke with patient to complete ACM enrollment. Patient reports that he is feeling better. Reports congestion has improved. Denies increased SOB, CP or wheezing. Reviewed COPD zone management tool and s/s to report to MD.    DM:  Patient reports that his BS has been running high as he has been eating [place] candy. Encouraged exercise; instructed patient to drink lots of water. Instructed on the importance of compliance with diet as directed. Discussed RD referral to support compliance;  patient is agreeable. Offered patient enrollment in the Remote Patient Monitoring (RPM) program for in-home monitoring: Yes, patient enrolled. Remote Patient Monitoring Enrollment Note      Date/Time:  12/21/2022 2:56 PM    Offered patient enrollment in the [employer] Remote Patient Monitoring (RPM) program for in home monitoring for COPD/ DM. Patient accepted RPM services. Patient will be monitoring the following daily:  blood pressure reading  blood pressure heart rate  glucose reading  pulse ox reading  pulse ox heart rate  survey response  weight    ACM reviewed the information below with patient:    Emergency Contact (name and contact number): Alondra Aguilar  ( sister) [phone]    [x] A member from the care coordination team will reach out to notify the patient once the RPM kit is ordered. [x] Once the kit is delivered, the [facility] team will contact the patient after UPS deliver to assist with set up. [x] Determined BP cuff size: regular (9.05\"-15.74\")      [] Determined weight scale: regular (<330lbs)                                                 [] Hours of ACM monitoring - Monday-Friday [id number]                         All questions about RPM program answered at this time. Patient denies any questions or concerns at this time. ACM contact information confirmed should questions arise.     Plan for next outreach:  zone review, confirm RPM follow up    Lab Results       None            Care Coordination Interventions    Referral from Primary Care Provider: No  Suggested Interventions and Community Resources  Medication Assistance Program: Declined  Registered Dietician: In Process  Social Work: Declined  Zone Management Tools: In Process          Goals Addressed                   This Visit's Progress     Conditions and Symptoms   No change     I will schedule office visits, as directed by my provider. I will keep my appointment or reschedule if I have to cancel. I will notify my provider of any barriers to my plan of care. I will follow my Zone Management tool to seek urgent or emergent care. I will notify my provider of any symptoms that indicate a worsening of my condition. Barriers: overwhelmed by complexity of regimen  Plan for overcoming my barriers:  Patient will refer to COPD zone tool to support disease management. Patient will demonstrate safety with home O2 as directed. Confidence:  8/10  Anticipated Goal Completion Date: 3/12/23                Prior to Admission medications    Medication Sig Start Date End Date Taking? Authorizing Provider   mupirocin (BACTROBAN) 2 % ointment Apply topically 2 times daily. 12/19/22 12/26/22  Jennifer Cosby MD   amoxicillin-clavulanate (AUGMENTIN) 875-125 MG per tablet Take 1 tablet by mouth 2 times daily for 7 days 12/16/22 12/23/22  MIGUEL Dan - CNP   metFORMIN (GLUCOPHAGE-XR) 500 MG extended release tablet TAKE 2 TABLETS BY MOUTH TWICE A DAY 10/17/22   Jennifer Cosby MD   umeclidinium-vilanterol (ANORO ELLIPTA) 62.5-25 MCG/INH AEPB inhaler Inhale 1 puff into the lungs in the morning. 7/18/22   Gatito Fuentes MD   blood glucose monitor strips Test 3 times a day & as needed for symptoms of irregular blood glucose.  Please fill with what is coverd by insurance 7/18/22   Jennifer Cosby MD   Lancets MISC Use one lancet 4 times daily 7/18/22 Maryann Reyes MD   glimepiride (AMARYL) 2 MG tablet TAKE 1 TABLET BY MOUTH TWICE A DAY 7/18/22   Maryann Reyes MD   albuterol sulfate HFA (VENTOLIN HFA) 108 (90 Base) MCG/ACT inhaler Inhale 2 puffs into the lungs every 6 hours as needed for Wheezing 2/17/22   Mahendra Camacho MD   blood glucose monitor strips Test 3-4 times a day & as needed for symptoms of irregular blood glucose. 1/17/22   Maryann Reyes MD   blood glucose monitor kit and supplies Use 3-4 times daily 1/17/22   Maryann Reyes MD   albuterol (PROVENTIL) (2.5 MG/3ML) 0.083% nebulizer solution Take 3 mLs by nebulization every 6 hours as needed for Wheezing  Patient not taking: Reported on 12/19/2022 1/17/22   Maryann Reyes MD       Future Appointments   Date Time Provider Kay Weaver   12/27/2022  1:45 PM [facility] MED ON[facility] NURSE [facility] MED ONC [place]   12/27/2022  2:00 PM Ezekiel Cat MD [facility]   1/23/2023 10:30 AM Maryann Reyes MD [facility]   ,   Diabetes Assessment    Medic Alert ID: No  Meal Planning: Carb counting, Avoidance of concentrated sweets   How often do you test your blood sugar?: Meals   Do you have barriers with adherence to non-pharmacologic self-management interventions?  (Nutrition/Exercise/Self-Monitoring): No   Have you ever had to go to the ED for symptoms of low blood sugar?: No            ,   COPD Assessment    Does the patient understand envrionmental exposure?: Yes  Is the patient able to verbalize Rescue vs. Long Acting medications?: Yes  Does the patient have a nebulizer?: Yes  Does the patient use a space with inhaled medications?: No            Symptoms:     Symptom course: stable  Have you had a recent diagnosis of pneumonia either by PCP or at a hospital?: No     , and   General Assessment    Do you have any symptoms that are causing concern?: No

## 2022-12-21 NOTE — CARE COORDINATION
Remote Patient Kit Ordering Note      Date/Time:  12/21/2022 3:32 PM      [x] [employer] confirmed patient shipping address  [x] Patient will receive package over the next 2-4 business days. Someone 21 years or older must be present to sign for UPS delivery. [x] Patient to contact virtual installation-specific phone number listed in the patient instructions. [x] If the patient does not contact HRS within 24 hours, an [employer] [facility] [place] will call the patient directly: If the patient does not answer, HRS will follow up with the clinical team notifying them about the unsuccessful attempt to contact the patient. HRS will make three call attempts to the patient. [x] LPN will contact patient once equipment is active to welcome them to the program.                                                         [x] Hours of RPM monitoring - Monday-Friday [id number]                     All questions answered at this time. LPN made aware the RPM kit has been ordered. EMTP notified patient of RPM equipment order.

## 2022-12-21 NOTE — PROGRESS NOTES
12/21/22 3:43 PM       Remote Patient Monitoring Treatment Plan    Received request from ACM/CTSANTO Ibanez RN to order remote patient monitoring for in home monitoring of COPD and Diabetes and order completed. Patient will be monitoring blood pressure   glucose  pulse ox   weight  survey questions. Patient will engage in Remote Patient Monitoring each day to develop the skills necessary for self management. RPM Care Team Responsibilities:   Alerts will be reviewed daily and addressed within 2-4 hours during operational hours (Monday -Friday 9 am-4 pm)  Alert response and intervention documented in patient medical record  Alert response escalated to PCP per protocol and documented in patient medical record  Patient monitored over approximately  days  Discharge from program based on self-management readiness    See care coordination encounters for additional details.       Abilio Main DNP, FNP-C, Remote Patient Monitoring NP, () [phone]

## 2022-12-25 NOTE — PROGRESS NOTES
Patient Name: Peyton Cortés  Patient : 1960  Patient MRN: [id number]     Primary Oncologist: Katie Rogers MD  Referring Provider: Toy Bueno MD     Date of Service: 2022      Chief Complaint:   Chief Complaint   Patient presents with    Follow-up     Patient Active Problem List:     Shortness of breath     Continuous dependence on cigarette smoking     Secondary polycythemia    HPI:   Mr. Chio Irene is a 29-year-old very pleasant gentleman with medical history significant for hypertension, hyperlipidemia, diabetes mellitus, COPD, osteoarthritis, obesity, initially referred to me on December 3, 2018 for evaluation of erythrocytosis. Mr. Chio Irene was found to have erythrocytosis since . He recently established care with primary care physician, Dr. Mira Vieira on 2018. Routine blood tests done on 2018 showed persistent erythrocytosis. He is a chronic smoker and he started smoking since he was 16. He used to smoke ~ 2 PPD before and he is currently smoking about 10 - 15 cigarettes per day. Since he is found to have persistent erythrocytosis, he was subsequently referred to me for further evaluation. He denied high altitude living. He does not have any family history of erythrocytosis. Laboratory work ups done on 12/3/18 showed normal erythropoietin level, normal ESR, LDH, iron study. JAK2 V617F mutation was negative. Abdominal sonogram done on 18 showed no splenomegaly or splenic mass. Limited evaluation liver, though grossly unremarkable. Focal area of gallbladder wall thickening measuring 5.9 mm, though limited with regards to scanning technique per the technologist. The remainder the gallbladder appears unremarkable. Likely clinically insignificant. If clinically concerned, evaluation for cystic duct obstruction could be performed with nuclear medicine imaging.     Low dose screening CT scan of chest done on 3/4/19 showed 0.4 cm solid nodule in the right lower lobe. Sequelae of granulomatous disease. Minimal coronary atherosclerotic calcifications. Continue annual lung screening with LDCT in 12 months. (probability of malignancy <1%). CT lung screening on May 27, 2020 showed stable appearance of chronic bronchitis with nonobstructive right mainstream bronchus dependent secretions. Stable chronic right hemidiaphragm elevation resulting in chronic lung base atelectasis. No acute thoracic abnormality or findings concerning for malignancy. CT lung screening done on May 28, 2021 showed a stable exam.  No acute cardiopulmonary findings or new suspicious pulmonary nodules. Dilated main pulmonary artery which can be seen with pulmonary hypertension. CT lung screening on 5/31/2022 showed improving consolidation in the right lower lobe since May 2021. The nodular opacity measuring 14 mm is seen in the right lower lobe which may represent persistent consolidation/atelectasis versus a pulmonary nodule. Radiologist recommend further evaluation with PET/CT scan. PET CT scan done on 6/20/2022 showed no focal activity in the right lower lobe. There was question of a nodule on the recent CT scan. Findings are favored to be benign and related to resolving consolidation with probable atelectasis or scarring. Attention on follow-up imaging. On December 27, 2022, he presented to me for follow up. 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 He stated that his symptoms has improved some with phlebotomy. He has been under observation since then. CT lung screening done on May 31, 2022 was reviewed with him. It showed nodular consolidation in RLL. I reviewed with him findings on PET/CT scan done on 6/20/22. If PET/CT scan findings are favored to be benign process, I recommend him to have repeat CT lung screening in 5/2023.       I recognized that his hematocrit today was 47.6% and I recommend for observation. He doesn't need phlebotomy this time. Since his symptoms improve with phlebotomy, I will recommend to keep his hematocrit less than 52%. He stopped smoking since 1/1/2021. Otherwise, I will see him back in 4 months. He doesn't have any significant symptoms at today visit. Past Medical History  Significant for  1. Hypertension  2. Hyperlipidemia  3. Diabetes mellitus4. COPD  5. Osteoarthritis  6. Obesity    Surgical History  Significant for  1. Cyst removal from his tailbone area. Allergies  No known medication allergies    Social History  He is a former smoker and he quit smoking on 11/2020. He started smoking since he was 14, until 11/2020. He used to smoke 2 pack/day. He denies alcohol drinking and illicit drug abuse. He is currently living in [facility]. Family History  Grandmother - Maternal (2nd Degree): Breast cancer    Oncology History    No history exists. Review of Systems: \"Per interval history; otherwise 10 point ROS is negative. \"  His energy level is pretty good and his sleep is good. He denies fever, chills, night sweats, cough, shortness of breath, chest pain, hemoptysis or palpitations. His bowel and bladder functions are normal. He doesn't have nausea, vomiting, diarrhea, constipation, dysuria, loss of appetite or weight loss. He denies neuropathy and he doesn't have bleeding or clotting issues. He denies any pain on today visit. No anxiety or depression. The rest of the systems are unremarkable.      Vital Signs:  BP (!) 161/78 (Site: Left Upper Arm, Position: Sitting, Cuff Size: Medium Adult)   Pulse 98   Temp 98 °F (36.7 °C) (Infrared)   Ht 5' 9\" (1.753 m)   Wt 207 lb 9.6 oz (94.2 kg)   SpO2 90%   BMI 30.66 kg/m²     Physical Exam:  CONSTITUTIONAL: awake, no apparent distress, alert, cooperative,   EYES: pupils equal, round and reactive to light, sclera clear and conjunctiva normal  ENT: Normocephalic, without obvious abnormality, atraumatic  NECK: supple, symmetrical, no jugular venous distension and no carotid bruits   HEMATOLOGIC/LYMPHATIC: no cervical, supraclavicular or axillary lymphadenopathy   LUNGS: VBS, no wheezes, no increased work of breathing, no crackles, clear to auscultation, no rhonchi,   CARDIOVASCULAR: regular rate and rhythm, normal S1 and S2, no murmur noted  ABDOMEN: normal bowel sounds x 4, non-tender, no masses palpated, no hepatosplenomegaly, soft, non-distended,    MUSCULOSKELETAL: full range of motion noted, tone is normal  NEUROLOGIC: awake, alert, oriented to name, place and time. Motor skills grossly intact. SKIN: Normal skin color, texture, turgor and no jaundice.  appears intact   EXTREMITIES: no leg swelling, no clubbing, no cyanosis, no LE edema,     Labs:  Hematology:  Lab Results   Component Value Date    WBC 8.7 12/27/2022    RBC 5.85 12/27/2022    HGB 15.1 12/27/2022    HCT 47.6 12/27/2022    MCV 81.4 12/27/2022    MCH 25.8 (L) 12/27/2022    MCHC 31.7 (L) 12/27/2022    RDW 20.2 (H) 12/27/2022     12/27/2022    MPV 9.2 12/27/2022    SEGSPCT 56.8 12/27/2022    EOSRELPCT 1.9 12/27/2022    BASOPCT 1.1 (H) 12/27/2022    LYMPHOPCT 17.5 (L) 12/27/2022    MONOPCT 22.7 (H) 12/27/2022    SEGSABS 4.9 12/27/2022    EOSABS 0.2 12/27/2022    BASOSABS 0.1 12/27/2022    LYMPHSABS 1.5 12/27/2022    MONOSABS 2.0 12/27/2022    DIFFTYPE AUTOMATED DIFFERENTIAL 12/27/2022     Lab Results   Component Value Date    ESR 2 01/11/2022     Chemistry:  Lab Results   Component Value Date     01/11/2022    K 4.9 01/11/2022    CL 94 (L) 01/11/2022    CO2 31 01/11/2022    BUN 13 01/11/2022    CREATININE 0.7 (L) 01/11/2022    GLUCOSE 339 (H) 01/11/2022    CALCIUM 9.2 01/11/2022    PROT 6.9 01/11/2022    LABALBU 4.0 01/11/2022    BILITOT 0.4 01/11/2022    ALKPHOS 86 01/11/2022    AST 12 (L) 01/11/2022    ALT 10 01/11/2022    LABGLOM >60 01/11/2022    GFRAA >60 01/11/2022    MG 2.1 07/03/2018    POCGLU 173 (H) 07/06/2018 Lab Results   Component Value Date     01/11/2022     No components found for: LD  Lab Results   Component Value Date    TSHHS 0.749 07/03/2018     Immunology:  Lab Results   Component Value Date    PROT 6.9 01/11/2022     No results found for: MAYA Marquez  No results found for: B2M  Coagulation Panel:  No results found for: PROTIME, INR, APTT, DDIMER  Anemia Panel:  No results found for: [id number], FOLATE  Tumor Markers:  No results found for: , CEA, , LABCA2, PSA  Observations:  PHQ-9 Total Score: 0 (12/27/2022  2:35 PM)        Assessment:   Erythrocytosis    Plan:  Mr. Araceli Resendiz is a 71-year-old very pleasant patient who was found to have persistent erythrocytosis on routine blood tests done on November 8, 2018. Laboratory work ups done on 12/3/18 showed normal erythropoietin level, normal ESR, LDH, iron study. JAK2 V617F mutation was negative. Abdominal sonogram is also negative for erythropoietin producing tumor. Since all the work ups were negative and I believe his erythrocytosis is due to hypoxemia from chronic cigarette smoking. CT lung screening on May 27, 2020 showed stable appearance of chronic bronchitis with nonobstructive right mainstream bronchus dependent secretions. Stable chronic right hemidiaphragm elevation resulting in chronic lung base atelectasis. No acute thoracic abnormality or findings concerning for malignancy. CT lung screening done on May 28, 2021 showed a stable exam.  No acute cardiopulmonary findings or new suspicious pulmonary nodules. Dilated main pulmonary artery which can be seen with pulmonary hypertension. PET CT scan done on 6/20/2022 showed no focal activity in the right lower lobe. There was question of a nodule on the recent CT scan. Findings are favored to be benign and related to resolving consolidation with probable atelectasis or scarring. Attention on follow-up imaging.     On December 27, 2022, he presented to me for follow up. 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 He stated that his symptoms has improved some with phlebotomy. He has been under observation since then. CT lung screening done on May 31, 2022 was reviewed with him. It showed nodular consolidation in RLL. I reviewed with him findings on PET/CT scan done on 6/20/22. If PET/CT scan findings are favored to be benign process, I recommend him to have repeat CT lung screening in 5/2023. I recognized that his hematocrit today was 47.6% and I recommend for observation. He doesn't need phlebotomy this time. Since his symptoms improve with phlebotomy, I will recommend to keep his hematocrit less than 52%. He stopped smoking since 1/1/2021. Otherwise, I will see him back in 4 months. I answered all his questions and concerns for today. Recent imaging and labs were reviewed and discussed with the patient.

## 2022-12-27 ENCOUNTER — OFFICE VISIT (OUTPATIENT)
Dept: ONCOLOGY | Age: 62
End: 2022-12-27
Payer: COMMERCIAL

## 2022-12-27 ENCOUNTER — HOSPITAL ENCOUNTER (OUTPATIENT)
Dept: INFUSION THERAPY | Age: 62
Discharge: HOME | End: 2022-12-27
Payer: COMMERCIAL

## 2022-12-27 ENCOUNTER — CARE COORDINATION (OUTPATIENT)
Dept: CARE COORDINATION | Age: 62
End: 2022-12-27

## 2022-12-27 VITALS
BODY MASS INDEX: 30.75 KG/M2 | TEMPERATURE: 98 F | SYSTOLIC BLOOD PRESSURE: 161 MMHG | HEART RATE: 98 BPM | WEIGHT: 207.6 LBS | OXYGEN SATURATION: 90 % | HEIGHT: 69 IN | DIASTOLIC BLOOD PRESSURE: 78 MMHG

## 2022-12-27 DIAGNOSIS — D75.1 SECONDARY POLYCYTHEMIA: Primary | ICD-10-CM

## 2022-12-27 DIAGNOSIS — D75.1 SECONDARY POLYCYTHEMIA: ICD-10-CM

## 2022-12-27 LAB
ALBUMIN SERPL-MCNC: 4.3 GM/DL (ref 3.4–5)
ALP BLD-CCNC: 77 IU/L (ref 40–129)
ALT SERPL-CCNC: 8 U/L (ref 10–40)
ANION GAP SERPL CALCULATED.3IONS-SCNC: 9 MMOL/L (ref 4–16)
AST SERPL-CCNC: 13 IU/L (ref 15–37)
BASOPHILS ABSOLUTE: 0.1 K/CU MM
BASOPHILS RELATIVE PERCENT: 1.1 % (ref 0–1)
BILIRUB SERPL-MCNC: 0.4 MG/DL (ref 0–1)
BUN BLDV-MCNC: 11 MG/DL (ref 6–23)
CALCIUM SERPL-MCNC: 8.9 MG/DL (ref 8.3–10.6)
CHLORIDE BLD-SCNC: 96 MMOL/L (ref 99–110)
CO2: 31 MMOL/L (ref 21–32)
CREAT SERPL-MCNC: 0.6 MG/DL (ref 0.9–1.3)
DIFFERENTIAL TYPE: ABNORMAL
EOSINOPHILS ABSOLUTE: 0.2 K/CU MM
EOSINOPHILS RELATIVE PERCENT: 1.9 % (ref 0–3)
ERYTHROCYTE SEDIMENTATION RATE: 28 MM/HR (ref 0–20)
GFR SERPL CREATININE-BSD FRML MDRD: >60 ML/MIN/1.73M2
GLUCOSE BLD-MCNC: 120 MG/DL (ref 70–99)
HCT VFR BLD CALC: 47.6 % (ref 42–52)
HEMOGLOBIN: 15.1 GM/DL (ref 13.5–18)
LACTATE DEHYDROGENASE: 161 IU/L (ref 120–246)
LYMPHOCYTES ABSOLUTE: 1.5 K/CU MM
LYMPHOCYTES RELATIVE PERCENT: 17.5 % (ref 24–44)
MCH RBC QN AUTO: 25.8 PG (ref 27–31)
MCHC RBC AUTO-ENTMCNC: 31.7 % (ref 32–36)
MCV RBC AUTO: 81.4 FL (ref 78–100)
MONOCYTES ABSOLUTE: 2 K/CU MM
MONOCYTES RELATIVE PERCENT: 22.7 % (ref 0–4)
PDW BLD-RTO: 20.2 % (ref 11.7–14.9)
PLATELET # BLD: 234 K/CU MM (ref 140–440)
PMV BLD AUTO: 9.2 FL (ref 7.5–11.1)
POTASSIUM SERPL-SCNC: 4.7 MMOL/L (ref 3.5–5.1)
RBC # BLD: 5.85 M/CU MM (ref 4.6–6.2)
SEGMENTED NEUTROPHILS ABSOLUTE COUNT: 4.9 K/CU MM
SEGMENTED NEUTROPHILS RELATIVE PERCENT: 56.8 % (ref 36–66)
SODIUM BLD-SCNC: 136 MMOL/L (ref 135–145)
TOTAL PROTEIN: 7.4 GM/DL (ref 6.4–8.2)
WBC # BLD: 8.7 K/CU MM (ref 4–10.5)

## 2022-12-27 PROCEDURE — 83615 LACTATE (LD) (LDH) ENZYME: CPT

## 2022-12-27 PROCEDURE — 85025 COMPLETE CBC W/AUTO DIFF WBC: CPT

## 2022-12-27 PROCEDURE — 99211 OFF/OP EST MAY X REQ PHY/QHP: CPT

## 2022-12-27 PROCEDURE — G8427 DOCREV CUR MEDS BY ELIG CLIN: HCPCS | Performed by: INTERNAL MEDICINE

## 2022-12-27 PROCEDURE — 3017F COLORECTAL CA SCREEN DOC REV: CPT | Performed by: INTERNAL MEDICINE

## 2022-12-27 PROCEDURE — G8484 FLU IMMUNIZE NO ADMIN: HCPCS | Performed by: INTERNAL MEDICINE

## 2022-12-27 PROCEDURE — G8417 CALC BMI ABV UP PARAM F/U: HCPCS | Performed by: INTERNAL MEDICINE

## 2022-12-27 PROCEDURE — 85652 RBC SED RATE AUTOMATED: CPT

## 2022-12-27 PROCEDURE — 36415 COLL VENOUS BLD VENIPUNCTURE: CPT

## 2022-12-27 PROCEDURE — 1036F TOBACCO NON-USER: CPT | Performed by: INTERNAL MEDICINE

## 2022-12-27 PROCEDURE — 99213 OFFICE O/P EST LOW 20 MIN: CPT | Performed by: INTERNAL MEDICINE

## 2022-12-27 PROCEDURE — 80053 COMPREHEN METABOLIC PANEL: CPT

## 2022-12-27 ASSESSMENT — PATIENT HEALTH QUESTIONNAIRE - PHQ9
SUM OF ALL RESPONSES TO PHQ9 QUESTIONS 1 & 2: 0
1. LITTLE INTEREST OR PLEASURE IN DOING THINGS: 0
SUM OF ALL RESPONSES TO PHQ QUESTIONS 1-9: 0
SUM OF ALL RESPONSES TO PHQ QUESTIONS 1-9: 0
2. FEELING DOWN, DEPRESSED OR HOPELESS: 0
SUM OF ALL RESPONSES TO PHQ QUESTIONS 1-9: 0
SUM OF ALL RESPONSES TO PHQ QUESTIONS 1-9: 0

## 2022-12-27 NOTE — PROGRESS NOTES
MA Rooming Questions  Patient: Cristal Dominique  MRN: [id number]    Date: 12/27/2022        1. Do you have any new issues?   no         2. Do you need any refills on medications?    no    3. Have you had any imaging done since your last visit? yes - Chest Xray     4. Have you been hospitalized or seen in the emergency room since your last visit here?   no    5. Did the patient have a depression screening completed today?  Yes    No data recorded     PHQ-9 Given to (if applicable):               PHQ-9 Score (if applicable):                     [] Positive     [x]  Negative              Does question #9 need addressed (if applicable)                     [] Yes    [x]  No               Carmella Weaver MA

## 2022-12-29 ENCOUNTER — CARE COORDINATION (OUTPATIENT)
Dept: CASE MANAGEMENT | Age: 62
End: 2022-12-29

## 2022-12-30 ENCOUNTER — CARE COORDINATION (OUTPATIENT)
Dept: CASE MANAGEMENT | Age: 62
End: 2022-12-30

## 2022-12-30 NOTE — CARE COORDINATION
Remote Alert Monitoring Note      Date/Time:  2022 12:29 PM    LPN contacted patient by telephone regarding red alert received for pulse ox reading (91). Verified patients name and  as identifiers. Background: Dm, COPD  Refer to 911 immediately if:  Patient unresponsive or unable to provide history  Change in cognition or sudden confusion  Patient unable to respond in complete sentences  Intense chest pain/tightness  Any concern for any clinical emergency  Red Alert: Provider response time of 1 hr required for any red alert requiring intervention  Yellow Alert: Provider response time of 3hr required for any escalated yellow alert    O2 Triage  Are you having any Chest Pain? no   Are you having any Shortness of Breath? no   Swelling in your hands or feet? no     Are you having any other health concerns or issues? no       Clinical Interventions: Reviewed and followed up on alerts and treatments-Called patient for lower SpO2 level of 90%. Patient rechecked it and it was 92% with pulse 94. Denies SOB, CP and swelling. Taking all medications as directed. Plan/Follow Up: Will continue to review, monitor and address alerts with follow up based on severity of symptoms and risk factors.     Current Patient Metrics ---- Blood Pressure: 144/90, 89bpm Glucose: 212mg/dl Pulseox: 92%, 94bpm Survey: C Weight: 216.0lbs Note Created at: 2022 12:38 PM ET ---- Time-Spent: 6 minutes 0 seconds

## 2023-01-03 ENCOUNTER — CARE COORDINATION (OUTPATIENT)
Dept: CASE MANAGEMENT | Age: 63
End: 2023-01-03

## 2023-01-03 NOTE — CARE COORDINATION
Remote Patient Monitoring Note      Date/Time:  1/3/2023 4:07 PM    LPN} attempted to contacted patient by telephone regarding yellow alert received for NO VS in past few days. Left HIPAA Compliant message reminding patient to record VS in RPM Program.   Will continue to follow. Plan/Follow Up: Will continue to review, monitor and address alerts with follow up based on severity of symptoms and risk factors.        Current Patient Metrics ---- Blood Pressure: -/-, -bpm Glucose: -mg/dl Pulseox: -%, -bpm Survey: - Weight: -lbs Note Created at: 01/03/2023 04:09 PM ET ---- Time-Spent: 4 minutes 0 seconds

## 2023-01-04 ENCOUNTER — CARE COORDINATION (OUTPATIENT)
Dept: CARE COORDINATION | Age: 63
End: 2023-01-04

## 2023-01-04 NOTE — CARE COORDINATION
Attempted patient contact for RPM alert follow up education. No answer to phone. Message left with ACM contact information and request for return call.

## 2023-01-05 ENCOUNTER — CARE COORDINATION (OUTPATIENT)
Dept: CARE COORDINATION | Age: 63
End: 2023-01-05

## 2023-01-05 NOTE — CARE COORDINATION
Remote Patient Monitoring Note      Date/Time:  1/5/2023 1:57 PM    Time-Spent: 5 minutes 0 seconds    LPN attempted to contact patient by telephone regarding yellow alert received for no metrics for >2 days. Background:  COPD, Diabetes  Clinical Interventions: HIPAA compliant message left on  requesting a return call. Plan/Follow Up: Will continue to review, monitor and address alerts with follow up based on severity of symptoms and risk factors.        Chelsie Al LPN  [facility]/ RPM  [phone]

## 2023-01-06 ENCOUNTER — CARE COORDINATION (OUTPATIENT)
Dept: CARE COORDINATION | Age: 63
End: 2023-01-06

## 2023-01-06 ENCOUNTER — CARE COORDINATION (OUTPATIENT)
Dept: CASE MANAGEMENT | Age: 63
End: 2023-01-06

## 2023-01-06 NOTE — CARE COORDINATION
Attempted to reach patient for ACM/ RPM follow up. No answer to phone. Message left with ACM contact information and request for call back.

## 2023-01-06 NOTE — CARE COORDINATION
Remote Patient Monitoring Note      Date/Time:  1/6/2023 3:24 PM  LPN} attempted to contacted patient by telephone regarding yellow alert received for 7 days NO Vitals. Attempted to reach patient for RPM yellow Alert. Unable to reach patient. Left HIPAA Compliant message on Voice Mail to REMIND patient to put metrics in. Phone number left on Voice Mail to call back. Will continue to follow. Yoko Stovall, RA    [phone]  [facility] Coordinator      Plan/Follow Up: Will continue to review, monitor and address alerts with follow up based on severity of symptoms and risk factors.       Current Patient Metrics ---- Blood Pressure: -/-, -bpm Glucose: -mg/dl Pulseox: -%, -bpm Survey: - Weight: -lbs Note Created at: 01/06/2023 03:25 PM ET ---- Time-Spent: 4 minutes 0 seconds

## 2023-01-09 ENCOUNTER — CARE COORDINATION (OUTPATIENT)
Dept: CASE MANAGEMENT | Age: 63
End: 2023-01-09
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
## 2023-01-10 ENCOUNTER — CARE COORDINATION (OUTPATIENT)
Dept: CASE MANAGEMENT | Age: 63
End: 2023-01-10
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
## 2023-01-11 ENCOUNTER — CARE COORDINATION (OUTPATIENT)
Dept: CASE MANAGEMENT | Age: 63
End: 2023-01-11

## 2023-01-11 NOTE — CARE COORDINATION
Remote Alert Monitoring Note      Date/Time:  2023 9:45 AM    LPN contacted patient by telephone regarding red alert received for pulse ox reading (90%). Verified patients name and  as identifiers. Background: COPD, DM  Refer to 911 immediately if:  Patient unresponsive or unable to provide history  Change in cognition or sudden confusion  Patient unable to respond in complete sentences  Intense chest pain/tightness  Any concern for any clinical emergency  Red Alert: Provider response time of 1 hr required for any red alert requiring intervention  Yellow Alert: Provider response time of 3hr required for any escalated yellow alert    O2 Triage  Are you having any Chest Pain? no   Are you having any Shortness of Breath? no   Swelling in your hands or feet? no     Are you having any other health concerns or issues? no       Clinical Interventions: Reviewed and followed up on alerts and treatments-Called patient for SpO2 level of 90%. Denies, O2 use, swelling, SOB, CP, dizziness. Hands cold at this time. Instructed to warm them up Recheck 92%. Continue with medications as directed. Education of patient/family/caregiver/guardian to support self-management-Instructed from now on to have warmer hand and hold still before testing. Take deep breaths before testing. Plan/Follow Up: Will continue to review, monitor and address alerts with follow up based on severity of symptoms and risk factors.     Current Patient Metrics ---- Blood Pressure: 132/81, 98bpm Glucose: 207mg/dl Pulseox: 92%, 92bpm Survey: C Weight: 208.0lbs Note Created at: 2023 09:50 AM ET ---- Time-Spent: 6 minutes 0 seconds

## 2023-01-12 ENCOUNTER — CARE COORDINATION (OUTPATIENT)
Dept: CARE COORDINATION | Age: 63
End: 2023-01-12
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
## 2023-01-12 NOTE — CARE COORDINATION
Remote Patient Monitoring Note      Date/Time:  1/12/2023 1:22 PM    EMTP reviewed patients reported daily Remote Patient Monitoring metrics. All reported metrics are within alert parameters. Plan/Follow Up:  Will continue to review, monitor and address alerts with follow up based on severity of symptoms and risk factors-- Current Patient Metrics ---- Blood Pressure: 125/75, 89bpm Glucose: 209mg/dl Pulseox: 92%, 87bpm Survey: C Weight: 208.0lbs Note Created at: 01/12/2023 01:22 PM ET ---- Time-Spent: 2 minutes 0 seconds

## 2023-01-13 ENCOUNTER — CARE COORDINATION (OUTPATIENT)
Dept: CARE COORDINATION | Age: 63
End: 2023-01-13

## 2023-01-13 NOTE — CARE COORDINATION
Remote Patient Monitoring Note      Date/Time:  1/13/2023 1:44 PM    EMTP reviewed patients reported daily Remote Patient Monitoring metrics. All reported metrics are within alert parameters. Plan/Follow Up:  Will continue to review, monitor and address alerts with follow up based on severity of symptoms and risk factors--- Current Patient Metrics ---- Blood Pressure: 116/72, 90bpm Glucose: 256mg/dl Pulseox: 93%, 82bpm Survey: C Weight: 208.6lbs Note Created at: 01/13/2023 01:44 PM ET ---- Time-Spent: 2 minutes 0 seconds

## 2023-01-16 ENCOUNTER — CARE COORDINATION (OUTPATIENT)
Dept: CARE COORDINATION | Age: 63
End: 2023-01-16

## 2023-01-16 NOTE — CARE COORDINATION
Remote Patient Monitoring Note      Date/Time:  1/16/2023 3:14 PM    EMTP reviewed patients reported daily Remote Patient Monitoring metrics. All reported metrics are within alert parameters. Plan/Follow Up:  Will continue to review, monitor and address alerts with follow up based on severity of symptoms and risk factors-- Current Patient Metrics ---- Blood Pressure: 118/83, 91bpm Glucose: 190mg/dl Pulseox: 98%, 81bpm Survey: C Weight: 209.0lbs Note Created at: 01/16/2023 03:15 PM ET ---- Time-Spent: 2 minutes 0 seconds

## 2023-01-17 ENCOUNTER — CARE COORDINATION (OUTPATIENT)
Dept: CARE COORDINATION | Age: 63
End: 2023-01-17

## 2023-01-17 NOTE — CARE COORDINATION
Remote Patient Monitoring Note      Date/Time:  1/17/2023 2:56 PM    EMTP reviewed patients reported daily Remote Patient Monitoring metrics. All reported metrics are within alert parameters. Plan/Follow Up:  Will continue to review, monitor and address alerts with follow up based on severity of symptoms and risk factors-- Current Patient Metrics ---- Blood Pressure: 145/88, 84bpm Glucose: 243mg/dl Pulseox: 93%, 85bpm Survey: C Weight: 208.4lbs Note Created at: 01/17/2023 02:56 PM ET ---- Time-Spent: 2 minutes 0 seconds

## 2023-01-18 ENCOUNTER — CARE COORDINATION (OUTPATIENT)
Dept: CASE MANAGEMENT | Age: 63
End: 2023-01-18

## 2023-01-18 NOTE — CARE COORDINATION
Remote Alert Monitoring Note      Date/Time:  2023 11:33 AM    LPN contacted patient by telephone regarding red alert received for pulse ox reading (91%). Verified patients name and  as identifiers. Background: COPD, DM  Refer to 911 immediately if:  Patient unresponsive or unable to provide history  Change in cognition or sudden confusion  Patient unable to respond in complete sentences  Intense chest pain/tightness  Any concern for any clinical emergency  Red Alert: Provider response time of 1 hr required for any red alert requiring intervention  Yellow Alert: Provider response time of 3hr required for any escalated yellow alert    O2 Triage  Are you having any Chest Pain? no   Are you having any Shortness of Breath? no   Swelling in your hands or feet? no     Are you having any other health concerns or issues? no       Clinical Interventions: Reviewed and followed up on alerts and treatments-Patient has SpO2 of 91%. Denies SOB, Swelling and CP. Rechecked and was 93/84. Education of patient/family/caregiver/guardian to support self-management-Make sure hands are warm, Hold still when testing and take a few deep breaths before testing. Patient is aware of when to contact MD with any new or worsening symptoms. Advised to contact PCP  with any health concerns for early outpatient intervention in an effort to avoid hospitalization. Report any worsening symptoms to PCP and/or Call 911 and/or GO TO  EMERGENCY ROOM if symptoms are severe. Expresses understanding. Plan/Follow Up: Will continue to review, monitor and address alerts with follow up based on severity of symptoms and risk factors.   Current Patient Metrics ---- Blood Pressure: 139/85, 92bpm Glucose: 205mg/dl Pulseox: 93%, 84bpm Survey: C Weight: 210.7lbs Note Created at: 2023 11:43 AM ET ---- Time-Spent: 10 minutes 0 seconds

## 2023-01-19 ENCOUNTER — CARE COORDINATION (OUTPATIENT)
Dept: CASE MANAGEMENT | Age: 63
End: 2023-01-19

## 2023-01-19 NOTE — CARE COORDINATION
Remote Alert Monitoring Note      Date/Time:  1/19/2023 12:03 PM    LPN attempted to contacted patient by telephone regarding red alert received for blood pressure reading (150/108). Background: DM, COPD  Refer to 911 immediately if:  Patient unresponsive or unable to provide history  Change in cognition or sudden confusion  Patient unable to respond in complete sentences  Intense chest pain/tightness  Any concern for any clinical emergency  Red Alert: Provider response time of 1 hr required for any red alert requiring intervention  Yellow Alert: Provider response time of 3hr required for any escalated yellow alert    Clinical Interventions: Reviewed and followed up on alerts and treatments-       Attempted to reach patient for RPM Red Alert Call. Unable to reach patient. X 2  Left HIPAA Compliant message on Voice Mail to call. Phone number left on Voice Mail to call back. Will continue to follow. Kain Quezada LPN    [phone]  [facility] Coordinator      Plan/Follow Up: Will continue to review, monitor and address alerts with follow up based on severity of symptoms and risk factors.   Current Patient Metrics ---- Blood Pressure: 150/108, 100bpm Glucose: 164mg/dl Pulseox: 93%, 93bpm Survey: C Weight: 208.6lbs Note Created at: 01/19/2023 03:59 PM ET ---- Time-Spent: 5 minutes 0 seconds

## 2023-01-20 ENCOUNTER — CARE COORDINATION (OUTPATIENT)
Dept: CARE COORDINATION | Age: 63
End: 2023-01-20

## 2023-01-20 NOTE — CARE COORDINATION
Remote Patient Monitoring Note      Date/Time:  1/20/2023 3:39 PM    EMTP reviewed patients reported daily Remote Patient Monitoring metrics. All reported metrics are within alert parameters. Plan/Follow Up:  Will continue to review, monitor and address alerts with follow up based on severity of symptoms and risk factors-- Current Patient Metrics ---- Blood Pressure: 119/76, 96bpm Glucose: 102mg/dl Pulseox: 92%, 92bpm Survey: C Weight: 209.6lbs Note Created at: 01/20/2023 03:39 PM ET ---- Time-Spent: 2 minutes 0 seconds

## 2023-01-23 ENCOUNTER — OFFICE VISIT (OUTPATIENT)
Dept: FAMILY MEDICINE CLINIC | Age: 63
End: 2023-01-23
Payer: COMMERCIAL

## 2023-01-23 ENCOUNTER — CARE COORDINATION (OUTPATIENT)
Dept: CASE MANAGEMENT | Age: 63
End: 2023-01-23

## 2023-01-23 VITALS
DIASTOLIC BLOOD PRESSURE: 82 MMHG | SYSTOLIC BLOOD PRESSURE: 145 MMHG | BODY MASS INDEX: 31.52 KG/M2 | HEART RATE: 84 BPM | WEIGHT: 212.8 LBS | OXYGEN SATURATION: 93 % | HEIGHT: 69 IN

## 2023-01-23 DIAGNOSIS — E11.65 UNCONTROLLED TYPE 2 DIABETES MELLITUS WITH HYPERGLYCEMIA (HCC): ICD-10-CM

## 2023-01-23 DIAGNOSIS — Z12.11 COLON CANCER SCREENING: ICD-10-CM

## 2023-01-23 DIAGNOSIS — Z00.00 MEDICARE ANNUAL WELLNESS VISIT, SUBSEQUENT: Primary | ICD-10-CM

## 2023-01-23 LAB — HBA1C MFR BLD: 6 %

## 2023-01-23 PROCEDURE — 3017F COLORECTAL CA SCREEN DOC REV: CPT | Performed by: FAMILY MEDICINE

## 2023-01-23 PROCEDURE — G8484 FLU IMMUNIZE NO ADMIN: HCPCS | Performed by: FAMILY MEDICINE

## 2023-01-23 PROCEDURE — 83036 HEMOGLOBIN GLYCOSYLATED A1C: CPT | Performed by: FAMILY MEDICINE

## 2023-01-23 PROCEDURE — 3044F HG A1C LEVEL LT 7.0%: CPT | Performed by: FAMILY MEDICINE

## 2023-01-23 PROCEDURE — G0439 PPPS, SUBSEQ VISIT: HCPCS | Performed by: FAMILY MEDICINE

## 2023-01-23 RX ORDER — LOSARTAN POTASSIUM 25 MG/1
25 TABLET ORAL DAILY
Qty: 30 TABLET | Refills: 5 | Status: SHIPPED | OUTPATIENT
Start: 2023-01-23

## 2023-01-23 RX ORDER — GLIMEPIRIDE 2 MG/1
TABLET ORAL
Qty: 180 TABLET | Refills: 3 | Status: CANCELLED | OUTPATIENT
Start: 2023-01-23

## 2023-01-23 ASSESSMENT — PATIENT HEALTH QUESTIONNAIRE - PHQ9
3. TROUBLE FALLING OR STAYING ASLEEP: 0
1. LITTLE INTEREST OR PLEASURE IN DOING THINGS: 1
4. FEELING TIRED OR HAVING LITTLE ENERGY: 1
7. TROUBLE CONCENTRATING ON THINGS, SUCH AS READING THE NEWSPAPER OR WATCHING TELEVISION: 0
5. POOR APPETITE OR OVEREATING: 0
6. FEELING BAD ABOUT YOURSELF - OR THAT YOU ARE A FAILURE OR HAVE LET YOURSELF OR YOUR FAMILY DOWN: 0
SUM OF ALL RESPONSES TO PHQ QUESTIONS 1-9: 3
10. IF YOU CHECKED OFF ANY PROBLEMS, HOW DIFFICULT HAVE THESE PROBLEMS MADE IT FOR YOU TO DO YOUR WORK, TAKE CARE OF THINGS AT HOME, OR GET ALONG WITH OTHER PEOPLE: 0
DEPRESSION UNABLE TO ASSESS: FUNCTIONAL CAPACITY MOTIVATION LIMITS ACCURACY
SUM OF ALL RESPONSES TO PHQ9 QUESTIONS 1 & 2: 1
2. FEELING DOWN, DEPRESSED OR HOPELESS: 0
SUM OF ALL RESPONSES TO PHQ QUESTIONS 1-9: 3
9. THOUGHTS THAT YOU WOULD BE BETTER OFF DEAD, OR OF HURTING YOURSELF: 0
8. MOVING OR SPEAKING SO SLOWLY THAT OTHER PEOPLE COULD HAVE NOTICED. OR THE OPPOSITE, BEING SO FIGETY OR RESTLESS THAT YOU HAVE BEEN MOVING AROUND A LOT MORE THAN USUAL: 1

## 2023-01-23 ASSESSMENT — LIFESTYLE VARIABLES
HOW OFTEN DO YOU HAVE A DRINK CONTAINING ALCOHOL: NEVER
HOW MANY STANDARD DRINKS CONTAINING ALCOHOL DO YOU HAVE ON A TYPICAL DAY: PATIENT DOES NOT DRINK

## 2023-01-23 NOTE — CARE COORDINATION
Remote Alert Monitoring Note      Date/Time:  2023 11:25 AM    LPN contacted patient by telephone regarding red alert received for pulse ox reading (91%). Verified patients name and  as identifiers. Attempted to reach patient for RPM Red Alert Call. Unable to reach patient. Left HIPAA Compliant message on Voice Mail to call. Phone number left on Voice Mail to call back. Will continue to follow. Yayo Lee RA    [phone]  [facility] Coordinator      Background: COPD, DM  Refer to 911 immediately if:  Patient unresponsive or unable to provide history  Change in cognition or sudden confusion  Patient unable to respond in complete sentences  Intense chest pain/tightness  Any concern for any clinical emergency  Red Alert: Provider response time of 1 hr required for any red alert requiring intervention  Yellow Alert: Provider response time of 3hr required for any escalated yellow alert    O2 Triage  Are you having any Chest Pain? no   Are you having any Shortness of Breath? no   Swelling in your hands or feet? no     Are you having any other health concerns or issues? no       Clinical Interventions: Reviewed and followed up on alerts and treatments-Patient called back and states he SpO2 was 93% at the doctors office. Was 91% earlier. Denies CP, SOB, Swelling NA intake. Taking medications as directed daily. Doctors appointment went well per patient. Education of patient/family/caregiver/guardian to support self-management-Instructed to make sure hands are warm and hold them still when testing. Take a few deep breaths before testing SpO2 level. Advised Patient to contact PCP  regarding any health concerns for early outpatient intervention in an effort to avoid hospitalization. Report any worsening symptoms to PCP and/or Call 911 and/or GO TO  EMERGENCY ROOM if symptoms are severe. Expresses understanding. Plan/Follow Up:  Will continue to review, monitor and address alerts with follow up based on severity of symptoms and risk factors.   Current Patient Metrics ---- Blood Pressure: 123/69, 90bpm Glucose: 105mg/dl Pulseox: 93%, 84bpm Survey: C Weight: 209.2lbs Note Created at: 01/23/2023 12:04 PM ET ---- Time-Spent: 12 minutes 0 seconds

## 2023-01-23 NOTE — PROGRESS NOTES
Medicare Annual Wellness Visit    Beth Roy is here for Medicare AWV    Assessment & Plan   Medicare annual wellness visit, subsequent  Doing fine  Use oxygen per NC     Uncontrolled type 2 diabetes mellitus with hyperglycemia (HCC)  -     POCT glycosylated hemoglobin (Hb A1C)  -     Getting much better  Colon cancer screening  -     Fecal DNA Colorectal cancer screening (Cologuard)    Recommendations for Preventive Services Due: see orders and patient instructions/AVS.  Recommended screening schedule for the next 5-10 years is provided to the patient in written form: see Patient Instructions/AVS.     Return for Medicare Annual Wellness Visit in 1 year. Subjective   The following acute and/or chronic problems were also addressed today:  Doing fine and has no complaints. Patient's complete Health Risk Assessment and screening values have been reviewed and are found in Flowsheets. The following problems were reviewed today and where indicated follow up appointments were made and/or referrals ordered. Positive Risk Factor Screenings with Interventions:        Depression:  Depression Unable to Assess: Functional capacity motivation limits accuracy  PHQ-2 Score: 1  PHQ-9 Total Score: 3    Interpretation:   1-4 = minimal  5-9 = mild  10-14 = moderate  15-19 = moderately severe  20-27 = severe          General HRA Questions:  Select all that apply: (!) New or Increased Fatigue    Fatigue Interventions:  Doing fine       Weight and Activity:  Physical Activity: Unknown    Days of Exercise per Week: 4 days    Minutes of Exercise per Session: Not on file     On average, how many days per week do you engage in moderate to strenuous exercise (like a brisk walk)?: 4 days  Have you lost any weight without trying in the past 3 months?: No  Body mass index: (!) 31.42  Obesity Interventions:  Encourage walking and loss wt.         Dentist Screen:  Have you seen the dentist within the past year?: (!) No    Intervention:  Advised to schedule with their dentist    Hearing Screen:  Do you or your family notice any trouble with your hearing that hasn't been managed with hearing aids?: (!) Yes    Interventions:  Referred to Audiology    Vision Screen:  Do you have difficulty driving, watching TV, or doing any of your daily activities because of your eyesight?: No  Have you had an eye exam within the past year?: (!) No  No results found. Interventions:   Patient encouraged to make appointment with their eye specialist    Safety:  Do all of your stairways have a railing or banister?: (!) No  Interventions:  Doing fine     Advanced Directives:  Do you have a Living Will?: (!) No    Intervention:                         Objective   Vitals:    01/23/23 1030 01/23/23 1052   BP: (!) 140/82 (!) 145/82   Site: Left Upper Arm    Position: Sitting    Cuff Size: Medium Adult    Pulse: 84    SpO2: 93%    Weight: 212 lb 12.8 oz (96.5 kg)    Height: 5' 9\" (1.753 m)       Body mass index is 31.43 kg/m². No Known Allergies  Prior to Visit Medications    Medication Sig Taking? Authorizing Provider   losartan (COZAAR) 25 MG tablet Take 1 tablet by mouth daily Yes Dioni Torres MD   metFORMIN (GLUCOPHAGE-XR) 500 MG extended release tablet TAKE 2 TABLETS BY MOUTH TWICE A DAY Yes Dioni Torres MD   umeclidinium-vilanterol (ANORO ELLIPTA) 62.5-25 MCG/INH AEPB inhaler Inhale 1 puff into the lungs in the morning. Yes Reinaldo Bo MD   blood glucose monitor strips Test 3 times a day & as needed for symptoms of irregular blood glucose. Please fill with what is coverd by insurance Yes Dioni Torres MD   Lancets MISC Use one lancet 4 times daily Yes Dioni Torres MD   glimepiride (AMARYL) 2 MG tablet TAKE 1 TABLET BY MOUTH TWICE A DAY Yes Dioni Torres MD   blood glucose monitor strips Test 3-4 times a day & as needed for symptoms of irregular blood glucose.  Yes Dioni Torres MD   blood glucose monitor kit and supplies Use 3-4 times daily Yes Bairon Toussaint MD   albuterol sulfate HFA (VENTOLIN HFA) 108 (90 Base) MCG/ACT inhaler Inhale 2 puffs into the lungs every 6 hours as needed for Wheezing  Patient not taking: Reported on 1/23/2023  Yanet Leiva MD   albuterol (PROVENTIL) (2.5 MG/3ML) 0.083% nebulizer solution Take 3 mLs by nebulization every 6 hours as needed for Wheezing  Patient not taking: Reported on 1/23/2023  Bairon Toussaint MD       [facility] (Including outside providers/suppliers regularly involved in providing care):   Patient Care Team:  Bairon Toussaint MD as PCP - General (Family Medicine)  Bairon Toussaint MD as PCP - [facility]  Silvia Patino RN as Ambulatory Care Manager     Reviewed and updated this visit:  Tobacco  Allergies  Meds  Med Hx  Surg Hx  Soc Hx  Fam Hx

## 2023-01-23 NOTE — PATIENT INSTRUCTIONS
Fatigue: Care Instructions  Your Care Instructions     Fatigue is a feeling of tiredness, exhaustion, or lack of energy. You may feel fatigue because of too much or not enough activity. It can also come from stress, lack of sleep, boredom, and poor diet. Many medical problems, such as viral infections, can cause fatigue. Emotional problems, especially depression, are often the cause of fatigue. Fatigue is most often a symptom of another problem. Treatment for fatigue depends on the cause. For example, if you have fatigue because you have a certain health problem, treating this problem also treats your fatigue. If depression or anxiety is the cause, treatment may help. Follow-up care is a key part of your treatment and safety. Be sure to make and go to all appointments, and call your doctor if you are having problems. It's also a good idea to know your test results and keep a list of the medicines you take. How can you care for yourself at home? Get regular exercise. But don't overdo it. Go back and forth between rest and exercise. Get plenty of rest.  Eat a healthy diet. Do not skip meals, especially breakfast.  Reduce your use of caffeine, tobacco, and alcohol. Caffeine is most often found in coffee, tea, cola drinks, and chocolate. Limit medicines that can cause fatigue. This includes tranquilizers and cold and allergy medicines. When should you call for help? Watch closely for changes in your health, and be sure to contact your doctor if:    You have new symptoms such as fever or a rash.     Your fatigue gets worse.     You have been feeling down, depressed, or hopeless. Or you may have lost interest in things that you usually enjoy.     You are not getting better as expected. Where can you learn more? Go to http://www.woods.com/ and enter Z824 to learn more about \"Fatigue: Care Instructions. \"  Current as of: February 9, 2022               Content Version: 13.5  © 4620-2116 Healthwise, Incorporated. Care instructions adapted under license by [facility] [facility]). If you have questions about a medical condition or this instruction, always ask your healthcare professional. [address] any warranty or liability for your use of this information. Learning About Vision Tests  What are vision tests? The four most common vision tests are visual acuity tests, refraction, visual field tests, and color vision tests. Visual acuity (sharpness) tests  These tests are used: To see if you need glasses or contact lenses. To monitor an eye problem. To check an eye injury. Visual acuity tests are done as part of routine exams. You may also have this test when you get your 's license or apply for some types of jobs. Visual field tests  These tests are used: To check for vision loss in any area of your range of vision. To screen for certain eye diseases. To look for nerve damage after a stroke, head injury, or other problem that could reduce blood flow to the brain. Refraction and color tests  A refraction test is done to find the right prescription for glasses and contact lenses. A color vision test is done to check for color blindness. Color vision is often tested as part of a routine exam. You may also have this test when you apply for a job where recognizing different colors is important, such as , electronics, or the [employer]. How are vision tests done? Visual acuity test   You cover one eye at a time. You read aloud from a wall chart across the room. You read aloud from a small card that you hold in your hand. Refraction   You look into a special device. The device puts lenses of different strengths in front of each eye to see how strong your glasses or contact lenses need to be. Visual field tests   Your doctor may have you look through special machines.   Or your doctor may simply have you stare straight ahead while they move a finger into and out of your field of vision. Color vision test   You look at pieces of printed test patterns in various colors. You say what number or symbol you see. Your doctor may have you trace the number or symbol using a pointer. How do these tests feel? There is very little chance of having a problem from this test. If dilating drops are used for a vision test, they may make the eyes sting and cause a medicine taste in the mouth. Follow-up care is a key part of your treatment and safety. Be sure to make and go to all appointments, and call your doctor if you are having problems. It's also a good idea to know your test results and keep a list of the medicines you take. Where can you learn more? Go to http://www.rose.com/ and enter G551 to learn more about \"Learning About Vision Tests. \"  Current as of: October 12, 2022               Content Version: 13.5  © 2006-2022 [employer]. Care instructions adapted under license by [facility] ([facility]). If you have questions about a medical condition or this instruction, always ask your healthcare professional. [place] [address] any warranty or liability for your use of this information. Advance Directives: Care Instructions  Overview  An advance directive is a legal way to state your wishes at the end of your life. It tells your family and your doctor what to do if you can't say what you want. There are two main types of advance directives. You can change them any time your wishes change. Living will. This form tells your family and your doctor your wishes about life support and other treatment. The form is also called a declaration. Medical power of . This form lets you name a person to make treatment decisions for you when you can't speak for yourself. This person is called a health care agent (health care proxy, health care surrogate).  The form is also called a durable power of  for health care.  If you do not have an advance directive, decisions about your medical care may be made by a family member, or by a doctor or a  who doesn't know you. It may help to think of an advance directive as a gift to the people who care for you. If you have one, they won't have to make tough decisions by themselves. For more information, including forms for your state, see the [address] website (www.caringinfo.org/planning/advance-directives/). Follow-up care is a key part of your treatment and safety. Be sure to make and go to all appointments, and call your doctor if you are having problems. It's also a good idea to know your test results and keep a list of the medicines you take. What should you include in an advance directive? Many states have a unique advance directive form. (It may ask you to address specific issues.) Or you might use a universal form that's approved by many states. If your form doesn't tell you what to address, it may be hard to know what to include in your advance directive. Use the questions below to help you get started. Who do you want to make decisions about your medical care if you are not able to? What life-support measures do you want if you have a serious illness that gets worse over time or can't be cured? What are you most afraid of that might happen? (Maybe you're afraid of having pain, losing your independence, or being kept alive by machines.)  Where would you prefer to die? (Your home? A hospital? A nursing home?)  Do you want to donate your organs when you die? Do you want certain Christianity practices performed before you die? When should you call for help? Be sure to contact your doctor if you have any questions. Where can you learn more? Go to http://www.Battlefy.com/ and enter R264 to learn more about \"Advance Directives: Care Instructions. \"  Current as of: June 16, 2022               Content Version: 13.5  © 9708-6345 Healthwise, Incorporated. Care instructions adapted under license by [facility] ([facility]). If you have questions about a medical condition or this instruction, always ask your healthcare professional. [place] [address] any warranty or liability for your use of this information. Personalized Preventive Plan for Sara Bojorquez - 1/23/2023  Medicare offers a range of preventive health benefits. Some of the tests and screenings are paid in full while other may be subject to a deductible, co-insurance, and/or copay. Some of these benefits include a comprehensive review of your medical history including lifestyle, illnesses that may run in your family, and various assessments and screenings as appropriate. After reviewing your medical record and screening and assessments performed today your provider may have ordered immunizations, labs, imaging, and/or referrals for you. A list of these orders (if applicable) as well as your Preventive Care list are included within your After Visit Summary for your review. Other Preventive Recommendations:    A preventive eye exam performed by an eye specialist is recommended every 1-2 years to screen for glaucoma; cataracts, macular degeneration, and other eye disorders. A preventive dental visit is recommended every 6 months. Try to get at least 150 minutes of exercise per week or 10,000 steps per day on a pedometer . Order or download the FREE \"Exercise & Physical Activity: Your Everyday Guide\" from [employer]. Call 4-290.940.2925 or search [employer] online. You need 1985-6584 mg of calcium and 8448-7363 IU of vitamin D per day. It is possible to meet your calcium requirement with diet alone, but a vitamin D supplement is usually necessary to meet this goal.  When exposed to the sun, use a sunscreen that protects against both UVA and UVB radiation with an SPF of 30 or greater.  Reapply every 2 to 3 hours or after sweating, drying off with a towel, or swimming. Always wear a seat belt when traveling in a car. Always wear a helmet when riding a bicycle or motorcycle.

## 2023-01-24 ENCOUNTER — CARE COORDINATION (OUTPATIENT)
Dept: CASE MANAGEMENT | Age: 63
End: 2023-01-24

## 2023-01-24 NOTE — CARE COORDINATION
Remote Patient Monitoring Note      Date/Time:  1/24/2023 12:03 PM    LPN reviewed patients reported daily Remote Patient Monitoring metrics. All reported metrics are within alert parameters. Plan/Follow Up:  Will continue to review, monitor and address alerts with follow up based on severity of symptoms and risk factors  Current Patient Metrics ---- Blood Pressure: 127/71, 88bpm Glucose: 112mg/dl Pulseox: 92%, 90bpm Survey: C Weight: 209.8lbs Note Created at: 01/24/2023 12:04 PM ET ---- Time-Spent: 3 minutes 0 seconds

## 2023-01-25 ENCOUNTER — CARE COORDINATION (OUTPATIENT)
Dept: CASE MANAGEMENT | Age: 63
End: 2023-01-25

## 2023-01-25 NOTE — CARE COORDINATION
Remote Alert Monitoring Note      Date/Time:  2023 11:25 AM    LPN contacted patient by telephone regarding red alert received for pulse ox reading (87%). Verified patients name and  as identifiers. Background: COPD, DM  Refer to 911 immediately if:  Patient unresponsive or unable to provide history  Change in cognition or sudden confusion  Patient unable to respond in complete sentences  Intense chest pain/ tightness 87%  Any concern for any clinical emergency  Red Alert: Provider response time of 1 hr required for any red alert requiring intervention  Yellow Alert: Provider response time of 3hr required for any escalated yellow alert    O2 Triage  Are you having any Chest Pain? no   Are you having any Shortness of Breath? no   Swelling in your hands or feet? no     Are you having any other health concerns or issues? no       Clinical Interventions: Reviewed and followed up on alerts and treatments-Patient has SpO2 level at 91%. Denies CP, SOB, Swelling hands and feet. Hands cold. Warmed up and recheck SpO2 level was 92%. No further action needed at this time. Education of patient/family/caregiver/guardian to support self-management-Warm hands up and keep fingers still while testing SpO2 level. Take a few deep breaths before testing SpO2 levels. Advised Patient to contact PCP  regarding any health concerns for early outpatient intervention in an effort to avoid hospitalization. Report any worsening symptoms to PCP and/or Call 911 and/or GO TO  EMERGENCY ROOM if symptoms are severe. Expresses understanding. Plan/Follow Up: Will continue to review, monitor and address alerts with follow up based on severity of symptoms and risk factors.   Current Patient Metrics ---- Blood Pressure: 112/88, 107bpm Glucose: 134mg/dl Pulseox: 92%, 93bpm Survey: C Weight: 210.4lbs Note Created at: 2023 11:37 AM ET ---- Time-Spent: 11 minutes 0 seconds

## 2023-01-26 ENCOUNTER — CARE COORDINATION (OUTPATIENT)
Dept: CARE COORDINATION | Age: 63
End: 2023-01-26

## 2023-01-26 NOTE — CARE COORDINATION
Remote Patient Monitoring Note      Date/Time:  1/26/2023 1:07 PM    EMTP reviewed patients reported daily Remote Patient Monitoring metrics. All reported metrics are within alert parameters. Plan/Follow Up:  Will continue to review, monitor and address alerts with follow up based on severity of symptoms and risk factors--- Current Patient Metrics ---- Blood Pressure: 126/81, 91bpm Glucose: 113mg/dl Pulseox: 94%, 95bpm Survey: C Weight: 208.6lbs Note Created at: 01/26/2023 01:07 PM ET ---- Time-Spent: 2 minutes 0 seconds

## 2023-01-26 NOTE — CARE COORDINATION
Attempted to reach patient for ACM follow up. No answer to phone. Message left with ACM contact information and request for call back. RPM metrics noted to be WNL.

## 2023-01-27 ENCOUNTER — CARE COORDINATION (OUTPATIENT)
Dept: CASE MANAGEMENT | Age: 63
End: 2023-01-27

## 2023-01-27 NOTE — CARE COORDINATION
Remote Alert Monitoring Note      Date/Time:  2023 11:37 AM    LPN contacted patient by telephone regarding red alert received for pulse ox reading (91%). Verified patients name and  as identifiers. Attempted to reach patient for RPM Red Alert Call. Unable to reach patient. Left HIPAA Compliant message on Voice Mail to call. Phone number left on Voice Mail to call back. Will continue to follow. Yoseph Rdz LPN    [phone]  [facility] Coordinator      Background: DM, COPD  Refer to 911 immediately if:  Patient unresponsive or unable to provide history  Change in cognition or sudden confusion  Patient unable to respond in complete sentences  Intense chest pain/tightness  Any concern for any clinical emergency  Red Alert: Provider response time of 1 hr required for any red alert requiring intervention  Yellow Alert: Provider response time of 3hr required for any escalated yellow alert    O2 Triage  Are you having any Chest Pain? no   Are you having any Shortness of Breath? no   Swelling in your hands or feet? no     Are you having any other health concerns or issues? no       Clinical Interventions: Reviewed and followed up on alerts and treatments-Patient has low SpO2 level 90%. Rechecked and was 94%. Denies CP, SOB, Swelling. No further action Needed at this time. Advised Patient to contact PCP  regarding any health concerns for early outpatient intervention in an effort to avoid hospitalization. Report any worsening symptoms to PCP and/or Call 911 and/or GO TO  EMERGENCY ROOM if symptoms are severe. Expresses understanding. Plan/Follow Up: Will continue to review, monitor and address alerts with follow up based on severity of symptoms and risk factors.   Current Patient Metrics ---- Blood Pressure: 107/66, 98bpm Glucose: 151mg/dl Pulseox: 94%, 93bpm Survey: C Weight: 211.4lbs Note Created at: 2023 01:12 PM ET ---- Time-Spent: 5 minutes 0 seconds

## 2023-01-30 ENCOUNTER — CARE COORDINATION (OUTPATIENT)
Dept: CARE COORDINATION | Age: 63
End: 2023-01-30

## 2023-01-30 NOTE — CARE COORDINATION
Remote Patient Monitoring Note      Date/Time:  1/30/2023 1:23 PM    EMTP reviewed patients reported daily Remote Patient Monitoring metrics. All reported metrics are within alert parameters. Plan/Follow Up:  Will continue to review, monitor and address alerts with follow up based on severity of symptoms and risk factors--- Current Patient Metrics ---- Blood Pressure: 136/77, 94bpm Glucose: 153mg/dl Pulseox: 92%, 87bpm Survey: C Weight: 209.8lbs Note Created at: 01/30/2023 01:24 PM ET ---- Time-Spent: 2 minutes 0 seconds

## 2023-02-01 ENCOUNTER — CARE COORDINATION (OUTPATIENT)
Dept: CARE COORDINATION | Age: 63
End: 2023-02-01

## 2023-02-01 NOTE — CARE COORDINATION
Remote Patient Monitoring Note      Date/Time:  2/1/2023 2:15 PM    EMTP reviewed patients reported daily Remote Patient Monitoring metrics. All reported metrics are within alert parameters. Plan/Follow Up:  Will continue to review, monitor and address alerts with follow up based on severity of symptoms and risk factors--- Current Patient Metrics ---- Blood Pressure: 128/79, 82bpm Glucose: 143mg/dl Pulseox: 93%, 86bpm Survey: C Weight: 209.4lbs Note Created at: 02/01/2023 02:15 PM ET ---- Time-Spent: 2 minutes 0 seconds

## 2023-02-02 ENCOUNTER — CARE COORDINATION (OUTPATIENT)
Dept: CARE COORDINATION | Age: 63
End: 2023-02-02

## 2023-02-02 NOTE — CARE COORDINATION
Remote Patient Monitoring Note      Date/Time:  2/2/2023 2:57 PM    CCSS reviewed patients reported daily Remote Patient Monitoring metrics. All reported metrics are within alert parameters. Plan/Follow Up:  Will continue to review, monitor and address alerts with follow up based on severity of symptoms and risk factors  Current Patient Metrics ---- Blood Pressure: 124/76, 91bpm Glucose: 139mg/dl Pulseox: 94%, 84bpm Survey: C Weight: 210.2lbs Note Created at: 02/02/2023 02:57 PM ET ---- Time-Spent: 2 minutes 0 seconds

## 2023-02-02 NOTE — CARE COORDINATION
Ambulatory Care Coordination Note  2/2/2023    ACC: Karri Baez, RN        Spoke with patient for ACM/ RPM monitoring. Metrics noted to be WNL. Discussed plan for graduation next outreach if no changes arise. DM:  Encouraged compliance with medications as directed. Instructed on routine BS monitoring; log for Provider review. COPD:  breathing is at baseline. Patient denies increased SOB, CP or wheezing. Reviewed COPD zone tpool and s    Offered patient enrollment in the Remote Patient Monitoring (RPM) program for in-home monitoring: Yes, patient enrolled. Discussed care gaps:  Encouraged routine diabetic eye/ foot exam. Instructed on the recommendation for routine Provider follow up; tele-health/ virtual visit options. Discussed ACP :  Patient is agreeable to have paperwork mailed out to him. Reports that he is not certain that he will need support for completion and prefers to let ACM know if needs arise. Patient denies any questions or concerns at this time. ACM contact information provided should questions arise. Plan for next outreach; progress toward graduation. Referral made with request for LSW support. Lab Results       None            Care Coordination Interventions    Referral from Primary Care Provider: No  Suggested Interventions and Community Resources  Medication Assistance Program: Declined  Registered Dietician: In Process  Social Work: Declined  Zone Management Tools: In Process          Goals Addressed                   This Visit's Progress     Conditions and Symptoms   No change     I will schedule office visits, as directed by my provider. I will keep my appointment or reschedule if I have to cancel. I will notify my provider of any barriers to my plan of care. I will follow my Zone Management tool to seek urgent or emergent care. I will notify my provider of any symptoms that indicate a worsening of my condition.     Barriers: overwhelmed by complexity of regimen  Plan for overcoming my barriers:  Patient will refer to COPD zone tool to support disease management. Patient will demonstrate safety with home O2 as directed. Confidence:  8/10  Anticipated Goal Completion Date: 3/12/23                Prior to Admission medications    Medication Sig Start Date End Date Taking? Authorizing Provider   losartan (COZAAR) 25 MG tablet Take 1 tablet by mouth daily 1/23/23   Sourav Arevalo MD   metFORMIN (GLUCOPHAGE-XR) 500 MG extended release tablet TAKE 2 TABLETS BY MOUTH TWICE A DAY 10/17/22   Sourav Arevalo MD   umeclidinium-vilanterol (ANORO ELLIPTA) 62.5-25 MCG/INH AEPB inhaler Inhale 1 puff into the lungs in the morning. 7/18/22   Lazaro Nichols MD   blood glucose monitor strips Test 3 times a day & as needed for symptoms of irregular blood glucose. Please fill with what is coverd by insurance 7/18/22   Sourav Arevalo MD   Lancets MISC Use one lancet 4 times daily 7/18/22   Sourav Arevalo MD   glimepiride (AMARYL) 2 MG tablet TAKE 1 TABLET BY MOUTH TWICE A DAY 7/18/22   Sourav Arevalo MD   albuterol sulfate HFA (VENTOLIN HFA) 108 (90 Base) MCG/ACT inhaler Inhale 2 puffs into the lungs every 6 hours as needed for Wheezing  Patient not taking: Reported on 1/23/2023 2/17/22   Lazaro Nichols MD   blood glucose monitor strips Test 3-4 times a day & as needed for symptoms of irregular blood glucose.  1/17/22   Sourav Arevalo MD   blood glucose monitor kit and supplies Use 3-4 times daily 1/17/22   Sourav Arevalo MD   albuterol (PROVENTIL) (2.5 MG/3ML) 0.083% nebulizer solution Take 3 mLs by nebulization every 6 hours as needed for Wheezing  Patient not taking: Reported on 1/23/2023 1/17/22   Sourav Arevalo MD       Future Appointments   Date Time Provider Kay Weaver   4/27/2023  9:45 AM YANIRA, MED ONC NURSE [address]   4/27/2023 10:00 AM Lizeth Le MD [address] [facility]   7[facility]26[facility]2023  [facility]   1/24/2024  8:30 AM MD Italia Womack [facility]   ,   Diabetes Assessment    Medic Alert ID: No  Meal Planning: Carb counting, Avoidance of concentrated sweets   How often do you test your blood sugar?: Meals   Do you have barriers with adherence to non-pharmacologic self-management interventions?  (Nutrition/Exercise/Self-Monitoring): No   Have you ever had to go to the ED for symptoms of low blood sugar?: No       Do you have hyperglycemia symptoms?: No   Do you have hypoglycemia symptoms?: No        ,   COPD Assessment    Does the patient understand envrionmental exposure?: Yes  Is the patient able to verbalize Rescue vs. Long Acting medications?: Yes  Does the patient have a nebulizer?: Yes  Does the patient use a space with inhaled medications?: No            Symptoms:     Change in chronic cough?: No/At Baseline     , and   General Assessment    Do you have any symptoms that are causing concern?: No

## 2023-02-03 ENCOUNTER — CARE COORDINATION (OUTPATIENT)
Dept: CARE COORDINATION | Age: 63
End: 2023-02-03

## 2023-02-03 NOTE — CARE COORDINATION
Remote Patient Monitoring Note      Date/Time:  2/3/2023 12:56 PM    CCSS reviewed patients reported daily Remote Patient Monitoring metrics. All reported metrics are within alert parameters. Plan/Follow Up:  Will continue to review, monitor and address alerts with follow up based on severity of symptoms and risk factors  Current Patient Metrics ---- Blood Pressure: 114/79, 93bpm Glucose: 198mg/dl Pulseox: 95%, 93bpm Survey: C Weight: 209.6lbs Note Created at: 02/03/2023 12:57 PM ET ---- Time-Spent: 2 minutes 0 seconds

## 2023-02-07 ENCOUNTER — CARE COORDINATION (OUTPATIENT)
Dept: CARE COORDINATION | Age: 63
End: 2023-02-07

## 2023-02-07 NOTE — CARE COORDINATION
Remote Patient Monitoring Note      Date/Time:  2/7/2023 1:08 PM    EMTP reviewed patients reported daily Remote Patient Monitoring metrics. All reported metrics are within alert parameters. Plan/Follow Up:  Will continue to review, monitor and address alerts with follow up based on severity of symptoms and risk factors--- Current Patient Metrics ---- Blood Pressure: 115/78, 91bpm Glucose: 151mg/dl Pulseox: 92%, 90bpm Survey: C Weight: 210.5lbs Note Created at: 02/07/2023 01:09 PM ET ---- Time-Spent: 2 minutes 0 seconds

## 2023-02-08 ENCOUNTER — CARE COORDINATION (OUTPATIENT)
Dept: CARE COORDINATION | Age: 63
End: 2023-02-08

## 2023-02-08 NOTE — CARE COORDINATION
Remote Patient Monitoring Note      Date/Time:  2/8/2023 2:40 PM    CCSS reviewed patients reported daily Remote Patient Monitoring metrics. All reported metrics are within alert parameters. Plan/Follow Up:  Will continue to review, monitor and address alerts with follow up based on severity of symptoms and risk factors  Current Patient Metrics ---- Blood Pressure: 120/72, 81bpm Glucose: 151mg/dl Pulseox: 92%, 72bpm Survey: C Weight: 210.2lbs Note Created at: 02/08/2023 02:40 PM ET ---- Time-Spent: 2 minutes 0 seconds

## 2023-02-09 ENCOUNTER — CARE COORDINATION (OUTPATIENT)
Dept: CARE COORDINATION | Age: 63
End: 2023-02-09

## 2023-02-09 ENCOUNTER — CARE COORDINATION (OUTPATIENT)
Dept: CASE MANAGEMENT | Age: 63
End: 2023-02-09

## 2023-02-09 NOTE — CARE COORDINATION
Ambulatory Care Coordination Note  2023    Patient Current Location:  Home: [address] [facility] [address]     ACM contacted the patient by telephone. Verified name and  with patient as identifiers. Provided introduction to self, and explanation of the ACM role. Challenges to be reviewed by the provider   Additional needs identified to be addressed with provider: No  none               Method of communication with provider: none. ACM: Kalani Tolliver RN        Spoke with patient for ACM / RPM follow up. Noted red alert; pulse ox. 91%. Re-check 94%; most recent check: 95%. COPD: Patient denies increased SOB, CP, congestion, wheezing, cough or worsening symptoms. Reviewed COPD zone management tool and s/s to report to MD.    Patient reports PCP recommendation with Pulmonology. Patient reports that his Pulmonologist has retired. Instructed on the recommendation for routine follow up. Offered multiple times to schedule; connect with new Provider and patient declined at this time. Encouraged patient to update ACM if needs arise. Offered patient enrollment in the Remote Patient Monitoring (RPM) program for in-home monitoring: Yes, patient enrolled:     DM: Most recent BS was 141; patient reports that BS is fluctuating a bit. Instructed on the importance of compliance with diet, medication as directed. Encouraged low carb/ low concentrated sugar diet. Discussed RD support and patient is agreeable. Discussed care gaps:  patient denies any questions. No needs reported:  ACM contact information provided should needs arise. Plan for next outreach:  COPD/ DM zone, RPM    Referral made with request for RD support. Lab Results       None            Care Coordination Interventions    Referral from Primary Care Provider: No  Suggested Interventions and Community Resources  Medication Assistance Program: Declined  Registered Dietician: In Process  Social Work:  In Process  Other Services: In Process (Comment: RPM)  Zone Management Tools: In Process          Goals Addressed                   This Visit's Progress     Conditions and Symptoms   No change     I will schedule office visits, as directed by my provider. I will keep my appointment or reschedule if I have to cancel. I will notify my provider of any barriers to my plan of care. I will follow my Zone Management tool to seek urgent or emergent care. I will notify my provider of any symptoms that indicate a worsening of my condition. Barriers: overwhelmed by complexity of regimen  Plan for overcoming my barriers:  Patient will refer to COPD zone tool to support disease management. Patient will demonstrate safety with home O2 as directed. Confidence:  8/10  Anticipated Goal Completion Date: 3/12/23                Future Appointments   Date Time Provider Kay Weaver   4/27/2023  9:45 AM YANIRA, MED ONC NURSE YANIRA MED ONC [place]   4/27/2023 10:00  S Isaiah Ortiz MD [address] [facility]   7/26/2023  9:30 AM MD Italia Polanco [facility]   1/24/2024  8:30 AM MD Italia Polanco [facility]   ,   Diabetes Assessment    Medic Alert ID: No  Meal Planning: Carb counting, Avoidance of concentrated sweets   How often do you test your blood sugar?: Meals   Do you have barriers with adherence to non-pharmacologic self-management interventions?  (Nutrition/Exercise/Self-Monitoring): No   Have you ever had to go to the ED for symptoms of low blood sugar?: No            ,   COPD Assessment    Does the patient understand envrionmental exposure?: Yes  Is the patient able to verbalize Rescue vs. Long Acting medications?: Yes  Does the patient have a nebulizer?: Yes  Does the patient use a space with inhaled medications?: No            Symptoms:          , and   General Assessment    Do you have any symptoms that are causing concern?: No

## 2023-02-10 ENCOUNTER — CARE COORDINATION (OUTPATIENT)
Dept: CARE COORDINATION | Age: 63
End: 2023-02-10

## 2023-02-10 NOTE — CARE COORDINATION
Remote Patient Monitoring Note          Date/Time:  2/10/2023 3:58 PM    Update: 2nd attempt to contact patient unsuccessful. Date/Time:  2/10/2023 1:27 PM    -- Current Patient Metrics ---- Blood Pressure: 81/62, 92bpm Glucose: -mg/dl Pulseox: 93%, 72bpm Survey: C Weight: 210.8lbs Note Created at: 02/10/2023 01:28 PM ET ---- Time-Spent: 5 minutes 0 seconds      LPN attempted to contact patient by telephone regarding red alert received for blood pressure reading (81/62) . Background: COPD, Diabetes    Clinical Interventions:   HIPAA compliant message left on  requesting a return call. Plan/Follow Up: Will continue to review, monitor and address alerts with follow up based on severity of symptoms and risk factors.        RA [facility] Nurse/ RPM  [phone]

## 2023-02-10 NOTE — CARE COORDINATION
RD received referral from Faizan BRANCH:  Patient is being referred to central dietitian for the following reason(s) and diagnoses :  DM     Preferred time to reach patient (if specified): any     Information provided (if any):  BS have been fluctuating. Needs further direction to support improved compliance with diet. RD contacted Ella Tello regarding Dietitian referral. Pt answered, RD explained reason for call and role in care. Pt prefers handouts in the mail first with a follow up call. RD verified address. RD noted patient's current A1C is 6.0% as of 1/23/23. RD will mail Meal Planner Diabetes, Power of Protein, Carbohydrates versus Noncarbohydrates, Sample Menu DM, Hypoglycemia and Hyperglycemia. RD will outreach in 2-3 weeks to follow up and answer any nutrition related questions at this time.     [address] [facility], [address] [place] [address]

## 2023-02-13 ENCOUNTER — CARE COORDINATION (OUTPATIENT)
Dept: CASE MANAGEMENT | Age: 63
End: 2023-02-13

## 2023-02-13 NOTE — CARE COORDINATION
Remote Patient Monitoring Note      Date/Time:  2/13/2023 10:59 AM    LPN reviewed patients reported daily Remote Patient Monitoring metrics. All reported metrics are within alert parameters. Plan/Follow Up:  Will continue to review, monitor and address alerts with follow up based on severity of symptoms and risk factors  Current Patient Metrics ---- Blood Pressure: 103/70, 97bpm Glucose: 229mg/dl Pulseox: 92%, 98bpm Survey: C Weight: 211.7lbs Note Created at: 02/13/2023 11:00 AM ET ---- Time-Spent: 3 minutes 0 seconds

## 2023-02-14 ENCOUNTER — CARE COORDINATION (OUTPATIENT)
Dept: CARE COORDINATION | Age: 63
End: 2023-02-14

## 2023-02-14 RX ORDER — BLOOD SUGAR DIAGNOSTIC
STRIP MISCELLANEOUS
Qty: 100 STRIP | Refills: 5 | Status: SHIPPED | OUTPATIENT
Start: 2023-02-14

## 2023-02-14 NOTE — CARE COORDINATION
Remote Patient Monitoring Note      Date/Time:  2/14/2023 1:45 PM    EMTP reviewed patients reported daily Remote Patient Monitoring metrics. All reported metrics are within alert parameters. Plan/Follow Up:  Will continue to review, monitor and address alerts with follow up based on severity of symptoms and risk factors--- Current Patient Metrics ---- Blood Pressure: 124/79, 88bpm Glucose: 171mg/dl Pulseox: 95%, 93bpm Survey: C Weight: 212.7lbs Note Created at: 02/14/2023 01:46 PM ET ---- Time-Spent: 2 minutes 0 seconds

## 2023-02-15 ENCOUNTER — CARE COORDINATION (OUTPATIENT)
Dept: CARE COORDINATION | Age: 63
End: 2023-02-15

## 2023-02-15 RX ORDER — LOSARTAN POTASSIUM 25 MG/1
TABLET ORAL
Qty: 30 TABLET | Refills: 5 | OUTPATIENT
Start: 2023-02-15

## 2023-02-15 NOTE — CARE COORDINATION
CCSS reviewed patients reported daily Remote Patient Monitoring metrics. All reported metrics are within alert parameters. Plan/Follow Up:  Will continue to review, monitor and address alerts with follow up based on severity of symptoms and risk factors     Current Patient Metrics ---- Blood Pressure: 128/98, 90bpm Glucose: 169mg/dl Pulseox: 93%, 89bpm Survey: C Weight: 211.8lbs Note Created at: 02/15/2023 01:19 PM CT ---- Time-Spent: 2 minutes 0 seconds       [facility]   Cell: [phone]

## 2023-02-16 ENCOUNTER — CARE COORDINATION (OUTPATIENT)
Dept: CARE COORDINATION | Age: 63
End: 2023-02-16

## 2023-02-16 NOTE — CARE COORDINATION
Remote Patient Monitoring Note      Date/Time:  2/16/2023 1:18 PM    CCSS reviewed patients reported daily Remote Patient Monitoring metrics. All reported metrics are within alert parameters. Plan/Follow Up:  Will continue to review, monitor and address alerts with follow up based on severity of symptoms and risk factors Current Patient Metrics ---- Blood Pressure: 145/86, 87bpm Glucose: 125mg/dl Pulseox: 93%, 88bpm Survey: C Weight: 212.6lbs Note Created at: 02/16/2023 01:19 PM ET ---- Time-Spent: 2 minutes 0 seconds

## 2023-02-17 ENCOUNTER — CARE COORDINATION (OUTPATIENT)
Dept: CARE COORDINATION | Age: 63
End: 2023-02-17

## 2023-02-17 NOTE — CARE COORDINATION
Remote Patient Monitoring Note      Date/Time:  2/17/2023 12:32 PM    CCSS reviewed patients reported daily Remote Patient Monitoring metrics. All reported metrics are within alert parameters. Plan/Follow Up:  Will continue to review, monitor and address alerts with follow up based on severity of symptoms and risk factors   Current Patient Metrics ---- Blood Pressure: 96/48, 84bpm Glucose: 168mg/dl Pulseox: 93%, 94bpm Survey: C Weight: 212.8lbs Note Created at: 02/17/2023 12:32 PM ET ---- Time-Spent: 2 minutes 0 seconds

## 2023-02-20 ENCOUNTER — CARE COORDINATION (OUTPATIENT)
Dept: CARE COORDINATION | Age: 63
End: 2023-02-20

## 2023-02-20 NOTE — CARE COORDINATION
Remote Patient Monitoring Note      Date/Time:  2/20/2023 12:31 PM    CCSS reviewed patients reported daily Remote Patient Monitoring metrics. All reported metrics are within alert parameters. Plan/Follow Up:  Will continue to review, monitor and address alerts with follow up based on severity of symptoms and risk factors  Current Patient Metrics ---- Blood Pressure: 120/59, 87bpm Glucose: 149mg/dl Pulseox: 94%, 78bpm Survey: C Weight: 214.3lbs Note Created at: 02/20/2023 12:32 PM ET ---- Time-Spent: 2 minutes 0 seconds

## 2023-02-21 ENCOUNTER — CARE COORDINATION (OUTPATIENT)
Dept: CARE COORDINATION | Age: 63
End: 2023-02-21

## 2023-02-21 NOTE — CARE COORDINATION
Remote Patient Monitoring Note      Date/Time:  2/21/2023 10:31 AM    CCSS reviewed patients reported daily Remote Patient Monitoring metrics. All reported metrics are within alert parameters. Plan/Follow Up:  Will continue to review, monitor and address alerts with follow up based on severity of symptoms and risk factors   Current Patient Metrics ---- Blood Pressure: 128/69, 76bpm Glucose: 107mg/dl Pulseox: 93%, 78bpm Survey: C Weight: 213.6lbs Note Created at: 02/21/2023 10:31 AM ET ---- Time-Spent: 2 minutes 0 seconds

## 2023-02-22 ENCOUNTER — CARE COORDINATION (OUTPATIENT)
Dept: CARE COORDINATION | Age: 63
End: 2023-02-22

## 2023-02-22 NOTE — CARE COORDINATION
Remote Patient Monitoring Note      Date/Time:  2/22/2023 11:56 AM    CCSS reviewed patients reported daily Remote Patient Monitoring metrics. All reported metrics are within alert parameters.     Plan/Follow Up: Will continue to review, monitor and address alerts with follow up based on severity of symptoms and risk factors  Current Patient Metrics ---- Blood Pressure: 140/75, 95bpm Glucose: 154mg/dl Pulseox: 92%, 88bpm Survey: C Weight: 210.4lbs Note Created at: 02/22/2023 11:57 AM ET ---- Time-Spent: 2 minutes 0 seconds

## 2023-02-23 ENCOUNTER — CARE COORDINATION (OUTPATIENT)
Dept: CASE MANAGEMENT | Age: 63
End: 2023-02-23

## 2023-02-23 ENCOUNTER — CARE COORDINATION (OUTPATIENT)
Dept: CARE COORDINATION | Age: 63
End: 2023-02-23

## 2023-02-23 NOTE — CARE COORDINATION
Remote Alert Monitoring Note      Date/Time:  2023 11:18 AM  Patient Current Location: [facility]    LPN contacted patient by telephone regarding red alert received for pulse ox reading (90%). Verified patients name and  as identifiers. Background: COPD, DM  Refer to 911 immediately if:  Patient unresponsive or unable to provide history  Change in cognition or sudden confusion  Patient unable to respond in complete sentences  Intense chest pain/tightness  Any concern for any clinical emergency  Red Alert: Provider response time of 1 hr required for any red alert requiring intervention  Yellow Alert: Provider response time of 3hr required for any escalated yellow alert    O2 Triage  Are you having any Chest Pain? no   Are you having any Shortness of Breath? no   Swelling in your hands or feet? no     Are you having any other health concerns or issues? no       Clinical Interventions: Reviewed and followed up on alerts and treatments-Patient has low SpO2 level of 90%. Denies CP, SOB, Swelling. Patient states has very cold hands at this time. Will continue trying to get hands warmer and recheck later on today. Rechecked SpO2 level and was 93%. No further action needed at this time. Education of patient/family/caregiver/guardian to support self-management-   Have warm hands, hold hands and fingers very still while testing SpO2 level. Take a few deep breaths before testing. Plan/Follow Up: Will continue to review, monitor and address alerts with follow up based on severity of symptoms and risk factors.     Current Patient Metrics ---- Blood Pressure: 120/55, 84bpm Glucose: 151mg/dl Pulseox: 93%, 81bpm Survey: C Weight: 210.3lbs Note Created at: 2023 11:49 AM ET ---- Time-Spent: 8 minutes 0 seconds

## 2023-02-24 ENCOUNTER — CARE COORDINATION (OUTPATIENT)
Dept: CARE COORDINATION | Age: 63
End: 2023-02-24

## 2023-02-24 NOTE — CARE COORDINATION
Remote Patient Monitoring Note      Date/Time:  2/24/2023 12:04 PM    CCSS reviewed patients reported daily Remote Patient Monitoring metrics. All reported metrics are within alert parameters. Plan/Follow Up:  Will continue to review, monitor and address alerts with follow up based on severity of symptoms and risk factors  Current Patient Metrics ---- Blood Pressure: 125/77, 78bpm Glucose: 125mg/dl Pulseox: 93%, 78bpm Survey: C Weight: 209.9lbs Note Created at: 02/24/2023 12:04 PM ET ---- Time-Spent: 2 minutes 0 seconds

## 2023-02-27 ENCOUNTER — CARE COORDINATION (OUTPATIENT)
Dept: CARE COORDINATION | Age: 63
End: 2023-02-27

## 2023-02-27 NOTE — CARE COORDINATION
Remote Patient Monitoring Note      Date/Time:  2/27/2023 2:07 PM    EMTP reviewed patients reported daily Remote Patient Monitoring metrics. All reported metrics are within alert parameters. Plan/Follow Up:  Will continue to review, monitor and address alerts with follow up based on severity of symptoms and risk factors---- Current Patient Metrics ---- Blood Pressure: 108/73, 90bpm Glucose: 199mg/dl Pulseox: 93%, 89bpm Survey: C Weight: 210.4lbs Note Created at: 02/27/2023 02:08 PM ET ---- Time-Spent: 2 minutes 0 seconds

## 2023-02-28 ENCOUNTER — CARE COORDINATION (OUTPATIENT)
Dept: CARE COORDINATION | Age: 63
End: 2023-02-28

## 2023-02-28 NOTE — CARE COORDINATION
Remote Patient Monitoring Note      Date/Time:  2/28/2023 11:39 AM    EMTP reviewed patients reported daily Remote Patient Monitoring metrics. All reported metrics are within alert parameters. Plan/Follow Up:  Will continue to review, monitor and address alerts with follow up based on severity of symptoms and risk factors--- Current Patient Metrics ---- Blood Pressure: 126/81, 87bpm Glucose: 144mg/dl Pulseox: 92%, 88bpm Survey: C Weight: 209.8lbs Note Created at: 02/28/2023 11:39 AM ET ---- Time-Spent: 2 minutes 0 seconds

## 2023-03-01 ENCOUNTER — CARE COORDINATION (OUTPATIENT)
Dept: CASE MANAGEMENT | Age: 63
End: 2023-03-01

## 2023-03-01 ENCOUNTER — CARE COORDINATION (OUTPATIENT)
Dept: CARE COORDINATION | Age: 63
End: 2023-03-01

## 2023-03-01 NOTE — CARE COORDINATION
Remote Patient Monitoring Note      Date/Time:  3/1/2023 2:01 PM    EMTP reviewed patients reported daily Remote Patient Monitoring metrics. Patient has not updated daily metrics at this time. Plan/Follow Up:  Will continue to review, monitor and address alerts with follow up based on severity of symptoms and risk factors--- Current Patient Metrics ---- Blood Pressure: -/-, -bpm Glucose: -mg/dl Pulseox: -%, -bpm Survey: C Weight: 210.5lbs Note Created at: 03/01/2023 02:01 PM ET ---- Time-Spent: 2 minutes 0 seconds

## 2023-03-01 NOTE — CARE COORDINATION
Remote Patient Monitoring Note      Date/Time:  3/1/2023 3:50 PM    LPN reviewed patients reported daily Remote Patient Monitoring metrics. All reported metrics are within alert parameters. Plan/Follow Up: Will continue to review, monitor and address alerts with follow up based on severity of symptoms and risk factors.     Blood Pressure: 123/77, 93bpm Glucose: -mg/dl Pulseox: -%, -bpm Survey: C Weight: 210.5lbs Note Created at: 03/01/2023 03:51 PM ET ---- Time-Spent: 2 minutes 0 seconds

## 2023-03-01 NOTE — CARE COORDINATION
Contacted Jeromy Bojorquez and left voicemail regarding Dietitian follow up. Left call back number and will follow up as appropriate.          [address] [place] [address],   [phone]

## 2023-03-02 ENCOUNTER — CARE COORDINATION (OUTPATIENT)
Dept: CASE MANAGEMENT | Age: 63
End: 2023-03-02

## 2023-03-02 NOTE — CARE COORDINATION
Remote Patient Monitoring Note      Date/Time:  3/2/2023 3:52 PM    LPN reviewed patients reported daily Remote Patient Monitoring metrics. All reported metrics are within alert parameters. Plan/Follow Up: Will continue to review, monitor and address alerts with follow up based on severity of symptoms and risk factors.      Current Patient Metrics ---- Blood Pressure: -/-, -bpm Glucose: -mg/dl Pulseox: -%, -bpm Survey: C Weight: 210.8lbs Note Created at: 03/02/2023 03:53 PM ET ---- Time-Spent: 2 minutes 0 seconds

## 2023-03-03 ENCOUNTER — CARE COORDINATION (OUTPATIENT)
Dept: CARE COORDINATION | Age: 63
End: 2023-03-03

## 2023-03-03 NOTE — CARE COORDINATION
Remote Patient Monitoring Note      Date/Time:  3/3/2023 3:41 PM    EMTP reviewed patients reported daily Remote Patient Monitoring metrics. All reported metrics are within alert parameters. Plan/Follow Up:  Will continue to review, monitor and address alerts with follow up based on severity of symptoms and risk factors--- Current Patient Metrics ---- Blood Pressure: 109/69, 95bpm Glucose: -mg/dl Pulseox: 94%, 87bpm Survey: C Weight: 210.8lbs Note Created at: 03/03/2023 03:41 PM ET ---- Time-Spent: 2 minutes 0 seconds

## 2023-03-03 NOTE — CARE COORDINATION
Attempted to reach patient for ACM follow up. No answer to phone. Message left with ACM contact information and request for call back. RPM noted to be WNL.

## 2023-03-06 ENCOUNTER — CARE COORDINATION (OUTPATIENT)
Dept: CARE COORDINATION | Age: 63
End: 2023-03-06

## 2023-03-06 ENCOUNTER — CARE COORDINATION (OUTPATIENT)
Dept: CASE MANAGEMENT | Age: 63
End: 2023-03-06

## 2023-03-06 NOTE — CARE COORDINATION
[address] [facility] regarding Dietitian follow up. Pt answered, RD explained reason for call and role in care. Patient states he has not received the handouts or coupons in the mail- RD verified address and will resend information. Reviewed the importance of eating balanced meals consistently throughout the day, taking medicine as directed and checking BS daily. Patient states he is eating 2 meals/day- per pt he eats lunch and dinner. Reviewed the components of a balanced meal using MyPlate. No BS reading provided per pt. Patient has no nutrition related questions or concerns at this time. RD will outreach in 2-3 weeks to follow up, ensure handouts/coupons were received in the mail and answer any nutrition related questions at this time.        [address] [place] [address], [place]  [phone]

## 2023-03-06 NOTE — CARE COORDINATION
Remote Patient Monitoring Note      Date/Time:  3/6/2023 2:11 PM  Patient Current Location: [facility]  LPN attempted to contacted patient by telephone regarding yellow alert received for NO BP or PO for 3 days. Background: DM, COPD  Clinical Interventions: Reviewed and followed up on alerts and treatments-       Attempted to reach patient for RPM yellow Alert. Unable to reach patient. Left HIPAA Compliant message on Voice Mail to REMIND patient to put metrics in. Phone number left on Voice Mail to call back. Will continue to follow. Claus Hardin LPN    [phone]  [facility] [place] [address] Coordinator      Plan/Follow Up: Will continue to review, monitor and address alerts with follow up based on severity of symptoms and risk factors.       Current Patient Metrics ---- Blood Pressure: -/-, -bpm Glucose: -mg/dl Pulseox: -%, -bpm Survey: - Weight: -lbs Note Created at: 03/06/2023 02:13 PM ET ---- Time-Spent: 3 minutes 0 seconds

## 2023-03-07 ENCOUNTER — CARE COORDINATION (OUTPATIENT)
Dept: CASE MANAGEMENT | Age: 63
End: 2023-03-07

## 2023-03-07 NOTE — CARE COORDINATION
Remote Patient Monitoring Note      Date/Time:  3/7/2023 1:22 PM    RN  reviewed patients reported daily Remote Patient Monitoring metrics. All reported metrics are within alert parameters. Plan/Follow Up:  Will continue to review, monitor and address alerts with follow up based on severity of symptoms and risk factors  Current Patient Metrics ---- Blood Pressure: 108/68, 99bpm Glucose: 240mg/dl Pulseox: 93%, 93bpm Survey: C Weight: 210.9lbs Note Created at: 03/07/2023 01:23 PM ET ---- Time-Spent: 3 minutes 0 seconds

## 2023-03-08 ENCOUNTER — CARE COORDINATION (OUTPATIENT)
Dept: CARE COORDINATION | Age: 63
End: 2023-03-08

## 2023-03-08 NOTE — CARE COORDINATION
Remote Patient Monitoring Note      Date/Time:  3/8/2023 11:46 AM    CCSS reviewed patients reported daily Remote Patient Monitoring metrics. All reported metrics are within alert parameters. Plan/Follow Up:  Will continue to review, monitor and address alerts with follow up based on severity of symptoms and risk factors  Current Patient Metrics ---- Blood Pressure: 123/67, 93bpm Glucose: 142mg/dl Pulseox: 92%, 94bpm Survey: C Weight: 211.4lbs Note Created at: 03/08/2023 11:47 AM ET ---- Time-Spent: 2 minutes 0 seconds

## 2023-03-09 ENCOUNTER — CARE COORDINATION (OUTPATIENT)
Dept: CARE COORDINATION | Age: 63
End: 2023-03-09

## 2023-03-09 NOTE — CARE COORDINATION
Remote Patient Monitoring Note      Date/Time:  3/9/2023 10:56 AM    EMTP reviewed patients reported daily Remote Patient Monitoring metrics. All reported metrics are within alert parameters. Plan/Follow Up:  Will continue to review, monitor and address alerts with follow up based on severity of symptoms and risk factors--- Current Patient Metrics ---- Blood Pressure: 92/65, 98bpm Glucose: 148mg/dl Pulseox: 92%, 91bpm Survey: C Weight: 205.0lbs Note Created at: 03/09/2023 10:57 AM ET ---- Time-Spent: 2 minutes 0 seconds

## 2023-03-10 ENCOUNTER — CARE COORDINATION (OUTPATIENT)
Dept: CASE MANAGEMENT | Age: 63
End: 2023-03-10

## 2023-03-10 ENCOUNTER — CARE COORDINATION (OUTPATIENT)
Dept: CARE COORDINATION | Age: 63
End: 2023-03-10

## 2023-03-10 NOTE — CARE COORDINATION
Remote Alert Monitoring Note      Date/Time:  3/10/2023 12:02 PM  Patient Current Location:  unknown    RN attemtped to   contact patient by telephone regarding red alert received for pulse ox reading (91) and weight increase (208.5 up 3# in 24 hours). Background: PRM for COPD  Refer to 911 immediately if:  Patient unresponsive or unable to provide history  Change in cognition or sudden confusion  Patient unable to respond in complete sentences  Intense chest pain/tightness  Any concern for any clinical emergency  Red Alert: Provider response time of 1 hr required for any red alert requiring intervention  Yellow Alert: Provider response time of 3hr required for any escalated yellow alert      Clinical Interventions:  Left HIPPA compliant message to return call, contact information provided. Will make another attempt today. Plan/Follow Up: Will continue to review, monitor and address alerts with follow up based on severity of symptoms and risk factors.    Current Patient Metrics ---- Blood Pressure: 106/71, 89bpm Glucose: 124mg/dl Pulseox: 91%, 84bpm Survey: C Weight: 208.5lbs Note Created at: 03/10/2023 12:07 PM ET ---- Time-Spent: 5 minutes 0 seconds

## 2023-03-10 NOTE — CARE COORDINATION
Ambulatory Care Coordination Note  3/10/2023    Patient Current Location:  Home: [address] [place] [address] [address] [address]     ACM contacted the patient by telephone. Verified name and  with patient as identifiers. Provided introduction to self, and explanation of the ACM role. Challenges to be reviewed by the provider   Additional needs identified to be addressed with provider: No  none               Method of communication with provider: none. ACM: Power Gloria RN    {Insert Care Summary Note PASU:[id number]     Patient reports that he is feeling ok. RPM red alert noted; pulse ox 91%. Reports that he has been having trouble on and off d/t pulse ox dropping in the AM.  Curent reading is 95%. Patient reports that he is using home oxygen as directed. Reports that he has been experiencing unexplained nose bleeds with increased frequency. Denies HA, dizziness or CP. Recommended PCP follow up. Patient is agreeable to ACM assistance to schedule appt. Scheduled PCP appt in 3/17/23 with notes updated to address drops in pulse ox. , weight gain, unexplained nose bleeds. Offered patient enrollment in the Remote Patient Monitoring (RPM) program for in-home monitoring: Yes, patient already enrolled. DM:  Encouraged continued BS monitoring. Instructed on the importance of compliance with medications; diet as directed. Reviewed DM zone tool and s/s to report to MD.     Patient denies any questions or concerns at this time. ACM contact information provided should needs arise. Plan for next outreach:  Confirm PCP follow up    Lab Results       None            Care Coordination Interventions    Referral from Primary Care Provider: No  Suggested Interventions and Community Resources  Medication Assistance Program: Declined  Registered Dietician: In Process  Social Work: In Process  Other Services: In Process (Comment: RPM)  Zone Management Tools:  In Process          Goals Addressed    This Visit's Progress     Conditions and Symptoms   No change     I will schedule office visits, as directed by my provider.  I will keep my appointment or reschedule if I have to cancel.  I will notify my provider of any barriers to my plan of care.  I will follow my Zone Management tool to seek urgent or emergent care.  I will notify my provider of any symptoms that indicate a worsening of my condition.    Barriers: overwhelmed by complexity of regimen  Plan for overcoming my barriers:  Patient will refer to COPD zone tool to support disease management.  Patient will demonstrate safety with home O2 as directed.   Confidence:  8/10  Anticipated Goal Completion Date: 3/12/23                Future Appointments   Date Time Provider Department Center   3/17/2023 12:45 PM MD Italia Spear [facility]   4/27/2023  9:45 AM YANIRA, MED ONC NURSE YANIR[facility] MED ONC [place]   4/27/2023 10:00 AM Brady Bermeo MD [facility]   7/26/2023  9:30 AM MD Italia Spear [facility]   1/24/2024  8:30 AM MD Italia Spear [facility]   ,   Diabetes Assessment    Medic Alert ID: No  Meal Planning: Carb counting, Avoidance of concentrated sweets   How often do you test your blood sugar?: Meals   Do you have barriers with adherence to non-pharmacologic self-management interventions? (Nutrition/Exercise/Self-Monitoring): No   Have you ever had to go to the ED for symptoms of low blood sugar?: No            ,   COPD Assessment    Does the patient understand envrionmental exposure?: Yes  Is the patient able to verbalize Rescue vs. Long Acting medications?: Yes  Does the patient have a nebulizer?: Yes  Does the patient use a space with inhaled medications?: No            Symptoms:          , and   General Assessment    Do you have any symptoms that are causing concern?: No

## 2023-03-13 ENCOUNTER — CARE COORDINATION (OUTPATIENT)
Dept: CASE MANAGEMENT | Age: 63
End: 2023-03-13

## 2023-03-13 NOTE — CARE COORDINATION
Remote Alert Monitoring Note  Date/Time:  3/13/2023 12:30 PM  Patient Current Location: [facility]  LPN contacted patient by telephone regarding red alert received for no metrics x 3 days. Left HIPPA compliant message to return call to this writer. Background: enrolled in RPM FOR COPD DM  Plan/Follow Up: Will continue to review, monitor and address alerts with follow up based on severity of symptoms and risk factors.      --- Current Patient Metrics ---- Blood Pressure: -/-, -bpm Glucose: -mg/dl Pulseox: -%, -bpm Survey: - Weight: -lbs Note Created at: 03/13/2023 12:28 PM ET ---- Time-Spent: 5 minutes 0 seconds

## 2023-03-13 NOTE — CARE COORDINATION
Remote Alert Monitoring Note  Date/Time:  3/13/2023 1:59 PM  Patient Current Location: [facility]  LPN contacted patient by telephone regarding red alert received for weight increase (211.1). increase of 5 pounds in 7 days no answer. Left HIPPA compliant message to return call to this writer. RETURN call from [place]. Background: COPD DM  Refer to 911 immediately if:  Patient unresponsive or unable to provide history  Change in cognition or sudden confusion  Patient unable to respond in complete sentences  Intense chest pain/tightness  Any concern for any clinical emergency  Red Alert: Provider response time of 1 hr required for any red alert requiring intervention  Yellow Alert: Provider response time of 3hr required for any escalated yellow alert  Weight Scale Triage  Was your weight obtained upon rising/waking today? no   Was your weight obtained after voiding and/or use of the bathroom today? yes   Did you weigh yourself in the same amount of clothing today, compared to how you typically do? no   Was the scale bumped or moved prior to today's weight? no   Is your scale on a flat/hard surface? yes   Did you obtain your weight with shoes on? yes   If yes, is this something you normally do during your daily weights? no   Were you standing up straight on the scale today? yes   Were you leaning on anything while obtaining your weight today? no     Clinical Interventions: Reviewed and followed up on alerts and treatments-pt returned call. [place] states his scale does not work. He does not weigh himself at the same time every day. Today he had house shoes on. Notes increased edema in left leg. Takes all medications as prescribed. Educated on low salt diet and elevting legs for edema. Educated on weighing self at the same time every day with same amount of clothes on. Verbalized understanding. Sees his PCP on 3/17/23. EMAILED HRS regarding scale not working properly. Plan/Follow Up:  Will continue to review, monitor and address alerts with follow up based on severity of symptoms and risk factors. ---- Current Patient Metrics ---- Blood Pressure: 123/73, 84bpm Glucose: -mg/dl Pulseox: 93%, 86bpm Survey: C Weight: 211.1lbs Note Created at: 03/13/2023 02:10 PM ET ---- Time-Spent: 10 minutes 0 seconds

## 2023-03-14 ENCOUNTER — CARE COORDINATION (OUTPATIENT)
Dept: CARE COORDINATION | Age: 63
End: 2023-03-14

## 2023-03-14 ENCOUNTER — TELEPHONE (OUTPATIENT)
Dept: PRIMARY CARE CLINIC | Age: 63
End: 2023-03-14

## 2023-03-14 ENCOUNTER — CARE COORDINATION (OUTPATIENT)
Dept: CASE MANAGEMENT | Age: 63
End: 2023-03-14

## 2023-03-14 NOTE — TELEPHONE ENCOUNTER
03/14/23 4:23 PM     REMOTE PATIENT MONITORING HIGH ALERT RESPONSE       ASSESSMENT AND PLAN   Weight gain  --did not order diuretics today but if responding to alert again for weight gain, will consider doing so at that time   --pt plans to attend F/U appt with PCP on 3/17    CHIEF COMPLAINT    Responding to high RPM alert for weight gain > 5# in 7 days (7.1#)    KEREN Knight is a 65 y.o. male who presents with left ankle edema and abdominal bloating. States he has a boil on left leg that has erupted. Denies erythema or warmth in this leg; denies fever/chills. States abd bloating started two days ago. Denies having edema any where else at this time. States he had similar situation several years ago and was admitted to hospital with heart failure episode. States he has not been on diuretics for a long time--unsure why they were D/C'd. Asked if he would be willing to take diuretic again and he is but states his pharmacy is getting ready to close. He has appt with PCP on Friday and states he intends to go. REVIEW OF SYSTEMS    CONSTITUTIONAL: stable fatigue and Denies fever and chills, RESPIRATORY: stable shortness of breath and GIRALDO, and CARDIOVASCULAR: swelling of left ankle and Denies chest pain, chest pressure/tightness, and palpitations, GI: abd bloating x 2 days     CURRENT MEDICATIONS    Current Outpatient Rx   Medication Sig Dispense Refill    ONETOUCH ULTRA strip TEST 3 TIMES A DAY AND AS NEEDED FOR SYMPTOMS OF IRREGULAR BLOOD SUAGR 100 strip 5    metFORMIN (GLUCOPHAGE-XR) 500 MG extended release tablet TAKE 2 TABLETS BY MOUTH TWICE A  tablet 3    losartan (COZAAR) 25 MG tablet Take 1 tablet by mouth daily 30 tablet 5    umeclidinium-vilanterol (ANORO ELLIPTA) 62.5-25 MCG/INH AEPB inhaler Inhale 1 puff into the lungs in the morning. 1 each 11    blood glucose monitor strips Test 3 times a day & as needed for symptoms of irregular blood glucose.  Please fill with what is coverd by insurance 100 strip 5    Lancets MISC Use one lancet 4 times daily 100 each 5    glimepiride (AMARYL) 2 MG tablet TAKE 1 TABLET BY MOUTH TWICE A  tablet 3    albuterol sulfate HFA (VENTOLIN HFA) 108 (90 Base) MCG/ACT inhaler Inhale 2 puffs into the lungs every 6 hours as needed for Wheezing (Patient not taking: Reported on 1/23/2023) 1 each 11    blood glucose monitor strips Test 3-4 times a day & as needed for symptoms of irregular blood glucose.  100 strip 5    blood glucose monitor kit and supplies Use 3-4 times daily 1 kit 0    albuterol (PROVENTIL) (2.5 MG/3ML) 0.083% nebulizer solution Take 3 mLs by nebulization every 6 hours as needed for Wheezing (Patient not taking: Reported on 1/23/2023) 120 each 3       ALLERGIES    No Known Allergies    RECENT LABS  Lab Results   Component Value Date/Time    PROBNP 2,515 07/02/2018 07:53 PM      Lab Results   Component Value Date/Time     12/27/2022 02:47 PM    K 4.7 12/27/2022 02:47 PM    CL 96 12/27/2022 02:47 PM    CO2 31 12/27/2022 02:47 PM    BUN 11 12/27/2022 02:47 PM    CREATININE 0.6 12/27/2022 02:47 PM    GLUCOSE 120 12/27/2022 02:47 PM    CALCIUM 8.9 12/27/2022 02:47 PM        Sheron Metzger DNP, FNP-C, Remote Patient Monitoring NP, (Ph) [phone]

## 2023-03-14 NOTE — CARE COORDINATION
Remote Alert Monitoring Note      Date/Time:  3/14/2023 1:01 PM  Patient Current Location: [facility]    LPN contacted patient by telephone regarding red alert received for weight increase (7# in 7 days). Verified patients name and  as identifiers. Background: COPD, DM  Refer to 911 immediately if:  Patient unresponsive or unable to provide history  Change in cognition or sudden confusion  Patient unable to respond in complete sentences  Intense chest pain/tightness  Any concern for any clinical emergency  Red Alert: Provider response time of 1 hr required for any red alert requiring intervention  Yellow Alert: Provider response time of 3hr required for any escalated yellow alert    Weight Scale Triage  USING SISTER'S SCALE  Was your weight obtained upon rising/waking today? yes   Was your weight obtained after voiding and/or use of the bathroom today? yes   Did you weigh yourself in the same amount of clothing today, compared to how you typically do? yes   Was the scale bumped or moved prior to today's weight? no   Is your scale on a flat/hard surface? yes   Did you obtain your weight with shoes on? no   If yes, is this something you normally do during your daily weights? no   Were you standing up straight on the scale today? yes   Were you leaning on anything while obtaining your weight today? no       Clinical Interventions: Reviewed and followed up on alerts and treatments-Patient has a weight gain of 7# in 7 days. Denies CP, Increased NA intake. Is SOB as baseline - non worsening. Uses Oxygen 2/L continuously. Has left leg swelling. Has PCP appointment 3/17/2023. Is using Sister's scale at this time due to HRS scale not working. Is having nose bleeds. Will escalate to PCP for weight gain and will send VS to PCP. Please see VS in chart note. No response from PCP will escalate to Olivia Galvez CNP.     Education of patient/family/caregiver/guardian to support self-management-Continue using sister's scale for the time being. Instructed patient on the importance of weighing daily, in the morning after urinating, Same Clothing on, NO shoes, Scale on Flat/hard surface, and that if the patient has weight gain of 3# over night or 5# weight gain in a week to call their physician immediately and report. Plan/Follow Up: Will continue to review, monitor and address alerts with follow up based on severity of symptoms and risk factors.     Current Patient Metrics ---- Blood Pressure: 122/78, 76bpm Glucose: 104mg/dl Pulseox: 92%, 80bpm Survey: C Weight: 212.1lbs Note Created at: 03/14/2023 02:52 PM ET ---- Time-Spent: 1 hours 20 minutes 0 seconds

## 2023-03-14 NOTE — CARE COORDINATION
Ambulatory Care Coordination Note  3/14/2023    Patient Current Location:  Home: [address] [place] [id number]     ACM contacted the patient by telephone. Verified name and  with patient as identifiers. Provided introduction to self, and explanation of the ACM role. Challenges to be reviewed by the provider   Additional needs identified to be addressed with provider: No  Weight gain               Method of communication with provider: chart routing. ACM: Miguelito Ramirez, RN      Spoke with patient for ACM/RPM follow up;red alert weight gain. Patient reports that he is feeling ok. Reports SOB on exertion as per his baseline. Denies increased cough,fatigue, CP or worsening SOB. I&O at baseline. Instructed on low sodium diet. Reviewed worsening s/s to report to MD/Wanda. In[facility] needs. Offered to assist to schedule appt. Patient declined. Confirmed follow up appt as scheduled previously per ACM. Of note:  patient reports increased swelling in left leg and abdomen. B&B status is WNL. Offered patient enrollment in the Remote Patient Monitoring (RPM) program for in-home monitoring: Yes, patient enrolled:     Patient denies any questions or concerns at this time. ACM contact information provided should needs arise. Message left on MA line in r/t patient status change. Plan for next outreach: RPM monitoring      Lab Results       None            Care Coordination Interventions    Referral from Primary Care Provider: No  Suggested Interventions and Community Resources  Medication Assistance Program: Declined  Registered Dietician: In Process  Social Work: In Process  Other Services: In Process (Comment: RPM)  Zone Management Tools:  In Process          Goals Addressed    None         Future Appointments   Date Time Provider Kay Weaver   3/17/2023 12:45 [facility]   2023  9:45 AM [address] [facility] ONC NURSE [facility] MED ONC [place]   4/27/2023 10:00  S Isaiah Ortiz MD [address] [facility] [place] [facility]   7/26/2023  9:30 AM MD Italia Curtis   1/24/2024  8:30 AM MD Italia Curtis   ,   Diabetes Assessment    Medic Alert ID: No  Meal Planning: Carb counting, Avoidance of concentrated sweets   How often do you test your blood sugar?: Meals   Do you have barriers with adherence to non-pharmacologic self-management interventions?  (Nutrition/Exercise/Self-Monitoring): No   Have you ever had to go to the ED for symptoms of low blood sugar?: No            ,   COPD Assessment    Does the patient understand envrionmental exposure?: Yes  Is the patient able to verbalize Rescue vs. Long Acting medications?: Yes  Does the patient have a nebulizer?: Yes  Does the patient use a space with inhaled medications?: No            Symptoms:          , and   General Assessment    Do you have any symptoms that are causing concern?: Yes  Progression since Onset: Unchanged  Reported Symptoms: Weight Gain

## 2023-03-16 ENCOUNTER — CARE COORDINATION (OUTPATIENT)
Dept: CARE COORDINATION | Age: 63
End: 2023-03-16

## 2023-03-16 NOTE — CARE COORDINATION
Spoke with patient for ACM follow up. Patient reports that he is doing about the same. Weight: 212 lb 3.2 oz (96.3 kg), BP: 131/73     No change in weight. BP WNL. Patient denies increased SOB, CP, or increased fatigue. No change in swelling to leg/ abdomen. Denies any change in elimination pattern. Denies any change in condition or need for intervention. Reminded of same day sick, tele-health and WIC options should needs arise. Reviewed worsening s/s to report to MD/Jose. Confirmed plan for patient to follow with PCP tomorrow. Patient denies any questions or concerns at this time. ACM contact information provided should questions arise.       Plan for next outreach:  RPM review

## 2023-03-17 ENCOUNTER — OFFICE VISIT (OUTPATIENT)
Dept: FAMILY MEDICINE CLINIC | Age: 63
End: 2023-03-17
Payer: COMMERCIAL

## 2023-03-17 VITALS
OXYGEN SATURATION: 93 % | SYSTOLIC BLOOD PRESSURE: 138 MMHG | BODY MASS INDEX: 31.16 KG/M2 | HEART RATE: 95 BPM | DIASTOLIC BLOOD PRESSURE: 70 MMHG | WEIGHT: 211 LBS

## 2023-03-17 DIAGNOSIS — J44.9 CHRONIC OBSTRUCTIVE PULMONARY DISEASE, UNSPECIFIED COPD TYPE (HCC): ICD-10-CM

## 2023-03-17 DIAGNOSIS — E11.9 CONTROLLED TYPE 2 DIABETES MELLITUS WITHOUT COMPLICATION, WITHOUT LONG-TERM CURRENT USE OF INSULIN (HCC): Primary | ICD-10-CM

## 2023-03-17 DIAGNOSIS — L84 CALLUS: ICD-10-CM

## 2023-03-17 DIAGNOSIS — R04.0 EPISTAXIS: ICD-10-CM

## 2023-03-17 DIAGNOSIS — I10 PRIMARY HYPERTENSION: ICD-10-CM

## 2023-03-17 DIAGNOSIS — R09.02 HYPOXIA: ICD-10-CM

## 2023-03-17 PROCEDURE — 3044F HG A1C LEVEL LT 7.0%: CPT | Performed by: FAMILY MEDICINE

## 2023-03-17 PROCEDURE — G8417 CALC BMI ABV UP PARAM F/U: HCPCS | Performed by: FAMILY MEDICINE

## 2023-03-17 PROCEDURE — G8484 FLU IMMUNIZE NO ADMIN: HCPCS | Performed by: FAMILY MEDICINE

## 2023-03-17 PROCEDURE — G8427 DOCREV CUR MEDS BY ELIG CLIN: HCPCS | Performed by: FAMILY MEDICINE

## 2023-03-17 PROCEDURE — 99214 OFFICE O/P EST MOD 30 MIN: CPT | Performed by: FAMILY MEDICINE

## 2023-03-17 PROCEDURE — 3078F DIAST BP <80 MM HG: CPT | Performed by: FAMILY MEDICINE

## 2023-03-17 PROCEDURE — 3074F SYST BP LT 130 MM HG: CPT | Performed by: FAMILY MEDICINE

## 2023-03-17 PROCEDURE — 1036F TOBACCO NON-USER: CPT | Performed by: FAMILY MEDICINE

## 2023-03-17 PROCEDURE — 3023F SPIROM DOC REV: CPT | Performed by: FAMILY MEDICINE

## 2023-03-17 PROCEDURE — 3017F COLORECTAL CA SCREEN DOC REV: CPT | Performed by: FAMILY MEDICINE

## 2023-03-17 PROCEDURE — 2022F DILAT RTA XM EVC RTNOPTHY: CPT | Performed by: FAMILY MEDICINE

## 2023-03-17 NOTE — PROGRESS NOTES
Atul Sullivan Maryellen  24    Chief Complaint   Patient presents with    Epistaxis     Pt states nosebleeds happen every day    6 Month Follow-Up    Diabetes    Hypertension    COPD           Here for a 6-month follow-up regarding his diabetes, HTN, and COPD. He is taking metformin 1000 mg twice a day and he does take the glimepiride 1 mg daily, patient does not watch his diet and he does not check his blood sugar every day. He does eat a lot of sweets and candy. The patient is taking hypertensive medications compliantly without side effects. Denies chest pain, dyspnea, edema, or TIA's. Past Medical History:   Diagnosis Date    Acute on chronic respiratory failure with hypoxemia ([address] 2022    Arthritis     Cataract     Chronic hypoxemic respiratory failure ([facility] 2022    COPD (chronic obstructive pulmonary disease) (HCC)     COPD, severe ([facility] 2022    Diabetes mellitus ([facility]     Enlarged prostate     Former smoker 2022    Hyperlipidemia     Hypertension      Past Surgical History:   Procedure Laterality Date    COLONOSCOPY      SKIN BIOPSY      TONSILLECTOMY       No family history on file.   Social History     Socioeconomic History    Marital status: Single     Spouse name: Not on file    Number of children: Not on file    Years of education: Not on file    Highest education level: Not on file   Occupational History    Not on file   Tobacco Use    Smoking status: Former     Packs/day: 0.50     Years: 47.00     Pack years: 23.50     Types: Cigarettes     Start date: 1973     Quit date: 2021     Years since quittin.2    Smokeless tobacco: Never   Substance and Sexual Activity    Alcohol use: No    Drug use: No    Sexual activity: Not on file     Comment: deferred   Other Topics Concern    Not on file   Social History Narrative    Not on file     Social Determinants of Health     Financial Resource Strain: Medium Risk    Difficulty of Paying Living Expenses: Somewhat hard   Food Insecurity: Food Insecurity Present    Worried About Running Out of Food in the Last Year: Sometimes true    Ran Out of Food in the Last Year: Sometimes true   Transportation Needs: No Transportation Needs    Lack of Transportation (Medical): No    Lack of Transportation (Non-Medical): No   Physical Activity: Unknown    Days of Exercise per Week: 4 days    Minutes of Exercise per Session: Not on file   Stress: No Stress Concern Present    Feeling of Stress : Only a little   Social Connections: Unknown    Frequency of Communication with Friends and Family: Twice a week    Frequency of Social Gatherings with Friends and Family: Once a week    Attends Gnosticism Services: Never    Active Member of Clubs or Organizations: No    Attends Club or Organization Meetings: Never    Marital Status: Not on file   Intimate Partner Violence: Not on file   Housing Stability: [address] to Pay for Housing in the Last Year: No    Number of [place] in the Last Year: 1    Unstable Housing in the Last Year: No       No Known Allergies  Current Outpatient Medications   Medication Sig Dispense Refill    ONETOUCH ULTRA strip TEST 3 TIMES A DAY AND AS NEEDED FOR SYMPTOMS OF IRREGULAR BLOOD SUAGR 100 strip 5    metFORMIN (GLUCOPHAGE-XR) 500 MG extended release tablet TAKE 2 TABLETS BY MOUTH TWICE A  tablet 3    losartan (COZAAR) 25 MG tablet Take 1 tablet by mouth daily 30 tablet 5    umeclidinium-vilanterol (ANORO ELLIPTA) 62.5-25 MCG/INH AEPB inhaler Inhale 1 puff into the lungs in the morning. 1 each 11    blood glucose monitor strips Test 3 times a day & as needed for symptoms of irregular blood glucose.  Please fill with what is coverd by insurance 100 strip 5    Lancets MISC Use one lancet 4 times daily 100 each 5    glimepiride (AMARYL) 2 MG tablet TAKE 1 TABLET BY MOUTH TWICE A  tablet 3    blood glucose monitor strips Test 3-4 times a day & as needed for symptoms of irregular blood glucose. 100 strip 5    blood glucose monitor kit and supplies Use 3-4 times daily 1 kit 0    albuterol sulfate HFA (VENTOLIN HFA) 108 (90 Base) MCG/ACT inhaler Inhale 2 puffs into the lungs every 6 hours as needed for Wheezing (Patient not taking: No sig reported) 1 each 11    albuterol (PROVENTIL) (2.5 MG/3ML) 0.083% nebulizer solution Take 3 mLs by nebulization every 6 hours as needed for Wheezing (Patient not taking: No sig reported) 120 each 3     No current facility-administered medications for this visit. Review of Systems   Constitutional:  Negative for activity change, appetite change, chills, fatigue and fever. HENT:  Positive for nosebleeds. Negative for congestion. Respiratory:  Negative for cough and shortness of breath. Cardiovascular:  Negative for chest pain and leg swelling. Musculoskeletal:  Negative for back pain. Neurological:  Negative for dizziness and headaches. Psychiatric/Behavioral:  Negative for agitation, behavioral problems and sleep disturbance. The patient is not nervous/anxious.       Lab Results   Component Value Date    WBC 8.7 12/27/2022    HGB 15.1 12/27/2022    HCT 47.6 12/27/2022    MCV 81.4 12/27/2022     12/27/2022     Lab Results   Component Value Date     12/27/2022    K 4.7 12/27/2022    CL 96 (L) 12/27/2022    CO2 31 12/27/2022    BUN 11 12/27/2022    CREATININE 0.6 (L) 12/27/2022    GLUCOSE 120 (H) 12/27/2022    CALCIUM 8.9 12/27/2022    PROT 7.4 12/27/2022    LABALBU 4.3 12/27/2022    BILITOT 0.4 12/27/2022    ALKPHOS 77 12/27/2022    AST 13 (L) 12/27/2022    ALT 8 (L) 12/27/2022    LABGLOM >60 12/27/2022    GFRAA >60 01/11/2022     Lab Results   Component Value Date    CHOL 151 01/17/2022    CHOL 110 07/03/2018     Lab Results   Component Value Date    TRIG 107 01/17/2022    TRIG 62 07/03/2018     Lab Results   Component Value Date    HDL 43 01/17/2022    HDL 38 (L) 07/03/2018     Lab Results   Component Value Date    LDLCALC 87 01/17/2022     Lab Results   Component Value Date    LABA1C 6.0 01/23/2023     Lab Results   Component Value Date    TSHHS 0.749 07/03/2018         /70 (Site: Left Upper Arm, Position: Sitting, Cuff Size: Medium Adult)   Pulse 95   Wt 211 lb (95.7 kg)   SpO2 93%   BMI 31.16 kg/m²     BP Readings from Last 3 Encounters:   03/17/23 138/70   03/17/23 122/75   01/23/23 (!) 145/82       Wt Readings from Last 3 Encounters:   03/17/23 211 lb (95.7 kg)   03/17/23 209 lb 6.4 oz (95 kg)   01/23/23 212 lb 12.8 oz (96.5 kg)         Physical Exam  Constitutional:       General: He is not in acute distress. Appearance: Normal appearance. He is well-developed. He is not ill-appearing. HENT:      Head: Normocephalic and atraumatic. Eyes:      General: No scleral icterus. Pupils: Pupils are equal, round, and reactive to light. Cardiovascular:      Rate and Rhythm: Normal rate and regular rhythm. Heart sounds: Normal heart sounds. No murmur heard. Pulmonary:      Effort: Pulmonary effort is normal.      Breath sounds: Normal breath sounds. No wheezing. Musculoskeletal:         General: Normal range of motion. Cervical back: Normal range of motion and neck supple. No rigidity. Right lower leg: No edema. Left lower leg: No edema. Neurological:      General: No focal deficit present. Mental Status: He is alert and oriented to person, place, and time. Psychiatric:         Mood and Affect: Mood normal.         Behavior: Behavior normal.       ASSESSMENT/ PLAN:    1. Controlled type 2 diabetes mellitus without complication, without long-term current use of insulin ([address]  - stable last A1C 6.0 on 1/23    2. Primary hypertension  - stable on losartan    3. Chronic obstructive pulmonary disease, unspecified COPD type ([address]  - stable, on oxygen    4. Rosemary Reyna MD, Pulmonology, [place]    5. Epistaxis  - Amb External Referral To ENT    6.  Callus  - Amb External Referral To Podiatry            - All old blood work reviewed with the patient  - Appropriate prescription are addressed. - After visit summery provided. - Questions answered and patient verbalizes understanding.  - Call for any problem, questions, or concerns. Return in about 6 months (around 9/17/2023).

## 2023-03-19 PROBLEM — E11.9 CONTROLLED TYPE 2 DIABETES MELLITUS WITHOUT COMPLICATION, WITHOUT LONG-TERM CURRENT USE OF INSULIN (HCC): Status: ACTIVE | Noted: 2023-03-19

## 2023-03-19 PROBLEM — R04.0 EPISTAXIS: Status: ACTIVE | Noted: 2023-03-19

## 2023-03-19 PROBLEM — R09.02 HYPOXIA: Status: ACTIVE | Noted: 2023-03-19

## 2023-03-19 PROBLEM — L84 CALLUS: Status: ACTIVE | Noted: 2023-03-19

## 2023-03-19 ASSESSMENT — ENCOUNTER SYMPTOMS
COUGH: 0
BACK PAIN: 0
SHORTNESS OF BREATH: 0

## 2023-03-21 ENCOUNTER — CARE COORDINATION (OUTPATIENT)
Dept: CARE COORDINATION | Age: 63
End: 2023-03-21

## 2023-03-21 NOTE — CARE COORDINATION
Attempted to reach patient for ACM follow up. No answer to phone. Message left with ACM contact information and request for call back. Weight: 209 lb 6.4 oz (95 kg), BP: 123/75  RPM vitals noted to be WNL.

## 2023-03-23 ENCOUNTER — OFFICE VISIT (OUTPATIENT)
Dept: PULMONOLOGY | Age: 63
End: 2023-03-23
Payer: COMMERCIAL

## 2023-03-23 VITALS
BODY MASS INDEX: 29.92 KG/M2 | HEIGHT: 70 IN | HEART RATE: 97 BPM | SYSTOLIC BLOOD PRESSURE: 127 MMHG | OXYGEN SATURATION: 92 % | WEIGHT: 209 LBS | DIASTOLIC BLOOD PRESSURE: 80 MMHG

## 2023-03-23 DIAGNOSIS — J98.4 RESTRICTIVE LUNG DISEASE: ICD-10-CM

## 2023-03-23 DIAGNOSIS — J44.1 ACUTE EXACERBATION OF CHRONIC OBSTRUCTIVE PULMONARY DISEASE (COPD) (HCC): Primary | ICD-10-CM

## 2023-03-23 DIAGNOSIS — Z87.891 FORMER SMOKER: ICD-10-CM

## 2023-03-23 DIAGNOSIS — J96.11 CHRONIC HYPOXEMIC RESPIRATORY FAILURE (HCC): ICD-10-CM

## 2023-03-23 DIAGNOSIS — J44.9 CHRONIC OBSTRUCTIVE PULMONARY DISEASE, UNSPECIFIED COPD TYPE (HCC): ICD-10-CM

## 2023-03-23 PROBLEM — F17.210 CONTINUOUS DEPENDENCE ON CIGARETTE SMOKING: Status: RESOLVED | Noted: 2020-04-14 | Resolved: 2023-03-23

## 2023-03-23 PROCEDURE — G8417 CALC BMI ABV UP PARAM F/U: HCPCS | Performed by: INTERNAL MEDICINE

## 2023-03-23 PROCEDURE — G8427 DOCREV CUR MEDS BY ELIG CLIN: HCPCS | Performed by: INTERNAL MEDICINE

## 2023-03-23 PROCEDURE — 3023F SPIROM DOC REV: CPT | Performed by: INTERNAL MEDICINE

## 2023-03-23 PROCEDURE — 99213 OFFICE O/P EST LOW 20 MIN: CPT | Performed by: INTERNAL MEDICINE

## 2023-03-23 PROCEDURE — 3017F COLORECTAL CA SCREEN DOC REV: CPT | Performed by: INTERNAL MEDICINE

## 2023-03-23 PROCEDURE — G8484 FLU IMMUNIZE NO ADMIN: HCPCS | Performed by: INTERNAL MEDICINE

## 2023-03-23 PROCEDURE — 1036F TOBACCO NON-USER: CPT | Performed by: INTERNAL MEDICINE

## 2023-03-23 RX ORDER — PREDNISONE 1 MG/1
TABLET ORAL
Qty: 38 TABLET | Refills: 0 | Status: SHIPPED | OUTPATIENT
Start: 2023-03-23

## 2023-03-23 RX ORDER — ALBUTEROL SULFATE 90 UG/1
2 AEROSOL, METERED RESPIRATORY (INHALATION) EVERY 6 HOURS PRN
Qty: 1 EACH | Refills: 11 | Status: SHIPPED | OUTPATIENT
Start: 2023-03-23

## 2023-03-23 RX ORDER — DOXYCYCLINE HYCLATE 100 MG
100 TABLET ORAL 2 TIMES DAILY
Qty: 14 TABLET | Refills: 0 | Status: SHIPPED | OUTPATIENT
Start: 2023-03-23 | End: 2023-03-30
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
## 2023-03-27 ENCOUNTER — CARE COORDINATION (OUTPATIENT)
Dept: CASE MANAGEMENT | Age: 63
End: 2023-03-27

## 2023-03-27 ENCOUNTER — CARE COORDINATION (OUTPATIENT)
Dept: CARE COORDINATION | Age: 63
End: 2023-03-27

## 2023-03-27 NOTE — CARE COORDINATION
5# weight gain in a week to call their physician immediately and report. Advised Patient to contact PCP 24/7 regarding any health concerns for early outpatient intervention in an effort to avoid hospitalization. Report any worsening symptoms to PCP and/or Call 911 and/or GO TO  EMERGENCY ROOM if symptoms are severe or worsening. Expresses understanding. Plan/Follow Up: Will continue to review, monitor and address alerts with follow up based on severity of symptoms and risk factors.     Current Patient Metrics ---- Blood Pressure: 124/68, 86bpm Glucose: 102mg/dl Pulseox: 92%, 87bpm Survey: C Weight: 211.6lbs Note Created at: 03/27/2023 12:01 PM ET ---- Time-Spent: 11 minutes 0 seconds

## 2023-03-27 NOTE — CARE COORDINATION
Contacted Jeromy Bojorquez and left voicemail regarding Dietitian follow up. Left call back number and will follow up as appropriate.          [address] [place] [address],   [phone]

## 2023-03-28 ENCOUNTER — CARE COORDINATION (OUTPATIENT)
Dept: CASE MANAGEMENT | Age: 63
End: 2023-03-28

## 2023-03-28 NOTE — CARE COORDINATION
Remote Alert Monitoring Note      Date/Time:  3/28/2023 11:00 AM  Patient Current Location: [address] [place] [address] Dr    LPN contacted patient by telephone regarding red alert received for blood pressure reading (137/107). Verified patients name and  as identifiers. Background: COPD, DM  Refer to 911 immediately if:  Patient unresponsive or unable to provide history  Change in cognition or sudden confusion  Patient unable to respond in complete sentences  Intense chest pain/tightness  Any concern for any clinical emergency  Red Alert: Provider response time of 1 hr required for any red alert requiring intervention  Yellow Alert: Provider response time of 3hr required for any escalated yellow alert    BP Triage  Are you having any Chest Pain? no   Are you having any Shortness of Breath? no   Do you have a headache or have any vision changes? no   Are you having any numbness or tingling? no   Are you having any other health concerns or issues? no       Clinical Interventions: Reviewed and followed up on alerts and treatments-Patient has elevated BP of 137/107. Denies CP, SOB, Stroke-like Symptoms, swelling, increased NA intake. Has appointment with cardiology 23. Has taken all medications this morning including ABX Prednisone and losartan. Has bronchitis. Patient states he is feeling better. Patient will recheck BP later on. Has no HX of HTN. Rechecked BP at 140/72. No further action needed at this time. Education of patient/family/caregiver/guardian to support self-management-Recheck BP  Instructed to place BP cuff on upper arm snuggly. Sit with feet flat on the floor. Sit quietly and relax for 5 minutes before taking BP. Advised Patient to contact PCP  regarding any health concerns for early outpatient intervention in an effort to avoid hospitalization. Report any worsening symptoms to PCP and/or Call 911 and/or GO TO  EMERGENCY ROOM if symptoms are severe or worsening. Expresses understanding.

## 2023-03-30 ENCOUNTER — CARE COORDINATION (OUTPATIENT)
Dept: CARE COORDINATION | Age: 63
End: 2023-03-30

## 2023-03-30 NOTE — CARE COORDINATION
Attempted to reach patient for ACM follow up. No answer to phone. Message left with ACM contact information and request for call back.        RPM Weight: 209 lb 3.2 oz (94.9 kg), BP: 112/75

## 2023-03-30 NOTE — CARE COORDINATION
Contacted Jeromy Bojorquez and left voicemail regarding Dietitian follow up. Left call back number. RD spoke with patient on 2/10/23 and has been following up with patient. RD outreached 3/27/23 and today 3/30/23- left VM both outreaches. RD will continue to follow/assist with patient return call.        [address] [place] [address],   [phone]

## 2023-04-03 ENCOUNTER — CARE COORDINATION (OUTPATIENT)
Dept: CASE MANAGEMENT | Age: 63
End: 2023-04-03

## 2023-04-03 NOTE — CARE COORDINATION
Remote Patient Monitoring Note      Date/Time:  4/3/2023 2:01 PM  Patient Current Location: [facility]  LPN attempted to contacted patient by telephone regarding yellow alert received for No VS for 3 days. Background: COPD, DM  Clinical Interventions: Reviewed and followed up on alerts and treatments-       Attempted to reach patient for RPM yellow Alert. Unable to reach patient. Left HIPAA Compliant message on Voice Mail to REMIND patient to put metrics in. Phone number left on Voice Mail to call back. Will continue to follow. Marco Olson LPN    [phone]  [facility] Coordinator      Plan/Follow Up: Will continue to review, monitor and address alerts with follow up based on severity of symptoms and risk factors.      Current Patient Metrics ---- Blood Pressure: -/-, -bpm Glucose: -mg/dl Pulseox: -%, -bpm Survey: - Weight: -lbs Note Created at: 04/03/2023 02:03 PM ET ---- Time-Spent: 2 minutes 0 seconds

## 2023-04-04 ENCOUNTER — CARE COORDINATION (OUTPATIENT)
Dept: CASE MANAGEMENT | Age: 63
End: 2023-04-04

## 2023-04-04 NOTE — CARE COORDINATION
Remote Patient Monitoring Note      Date/Time:  2023 3:29 PM  Patient Current Location: [facility]  LPN contacted patient by telephone regarding yellow alert received for No VS for 4 days and No Glucose for 5 days. Verified patients name and  as identifiers. Background: COPD, Diab. Clinical Interventions: Reviewed and followed up on alerts and treatments-         Called and reminded patient to put in VS for RPM.  Patient has been gone for 4 days to [facility]. Will start VS tomorrow including Glucose. Plan/Follow Up: Will continue to review, monitor and address alerts with follow up based on severity of symptoms and risk factors.      Current Patient Metrics ---- Blood Pressure: -/-, -bpm Glucose: -mg/dl Pulseox: -%, -bpm Survey: - Weight: -lbs Note Created at: 2023 03:30 PM ET ---- Time-Spent: 3 minutes 0 seconds

## 2023-04-06 ENCOUNTER — CARE COORDINATION (OUTPATIENT)
Dept: CARE COORDINATION | Age: 63
End: 2023-04-06

## 2023-04-06 NOTE — CARE COORDINATION
Long Acting medications?: Yes  Does the patient have a nebulizer?: Yes  Does the patient use a space with inhaled medications?: No            Symptoms:          , and   General Assessment    Do you have any symptoms that are causing concern?: No

## 2023-04-16 PROBLEM — R91.1 LUNG NODULE: Status: ACTIVE | Noted: 2023-04-16

## 2023-04-21 ENCOUNTER — CARE COORDINATION (OUTPATIENT)
Dept: CARE COORDINATION | Age: 63
End: 2023-04-21

## 2023-04-21 NOTE — CARE COORDINATION
Attempted to reach patient for ACM follow up. No answer to phone. Message left with ACM contact information and request for call back. Reviewed RPM metrics noted to be WNL. Plan to discuss possible RPM d/c next outreach.

## 2023-04-27 ENCOUNTER — OFFICE VISIT (OUTPATIENT)
Dept: ONCOLOGY | Age: 63
End: 2023-04-27
Payer: COMMERCIAL

## 2023-04-27 ENCOUNTER — HOSPITAL ENCOUNTER (OUTPATIENT)
Dept: INFUSION THERAPY | Age: 63
Discharge: HOME | End: 2023-04-27
Payer: COMMERCIAL

## 2023-04-27 VITALS
DIASTOLIC BLOOD PRESSURE: 66 MMHG | TEMPERATURE: 98 F | RESPIRATION RATE: 20 BRPM | WEIGHT: 215.4 LBS | HEART RATE: 81 BPM | BODY MASS INDEX: 30.84 KG/M2 | OXYGEN SATURATION: 93 % | SYSTOLIC BLOOD PRESSURE: 141 MMHG | HEIGHT: 70 IN

## 2023-04-27 DIAGNOSIS — F17.210 CONTINUOUS DEPENDENCE ON CIGARETTE SMOKING: ICD-10-CM

## 2023-04-27 DIAGNOSIS — D75.1 SECONDARY POLYCYTHEMIA: Primary | ICD-10-CM

## 2023-04-27 DIAGNOSIS — R91.1 LUNG NODULE: ICD-10-CM

## 2023-04-27 DIAGNOSIS — D75.1 SECONDARY POLYCYTHEMIA: ICD-10-CM

## 2023-04-27 LAB
ALBUMIN SERPL-MCNC: 4 GM/DL (ref 3.4–5)
ALP BLD-CCNC: 74 IU/L (ref 40–128)
ALT SERPL-CCNC: 6 U/L (ref 10–40)
ANION GAP SERPL CALCULATED.3IONS-SCNC: 9 MMOL/L (ref 4–16)
AST SERPL-CCNC: 11 IU/L (ref 15–37)
BASOPHILS ABSOLUTE: 0.1 K/CU MM
BASOPHILS RELATIVE PERCENT: 1 % (ref 0–1)
BILIRUB SERPL-MCNC: 0.6 MG/DL (ref 0–1)
BUN SERPL-MCNC: 9 MG/DL (ref 6–23)
CALCIUM SERPL-MCNC: 9 MG/DL (ref 8.3–10.6)
CHLORIDE BLD-SCNC: 98 MMOL/L (ref 99–110)
CO2: 31 MMOL/L (ref 21–32)
CREAT SERPL-MCNC: 0.7 MG/DL (ref 0.9–1.3)
DIFFERENTIAL TYPE: ABNORMAL
EOSINOPHILS ABSOLUTE: 0.6 K/CU MM
EOSINOPHILS RELATIVE PERCENT: 6.7 % (ref 0–3)
GFR SERPL CREATININE-BSD FRML MDRD: >60 ML/MIN/1.73M2
GLUCOSE SERPL-MCNC: 136 MG/DL (ref 70–99)
HCT VFR BLD CALC: 45.5 % (ref 42–52)
HEMOGLOBIN: 14.6 GM/DL (ref 13.5–18)
LYMPHOCYTES ABSOLUTE: 1.5 K/CU MM
LYMPHOCYTES RELATIVE PERCENT: 18.5 % (ref 24–44)
MCH RBC QN AUTO: 26.5 PG (ref 27–31)
MCHC RBC AUTO-ENTMCNC: 32.1 % (ref 32–36)
MCV RBC AUTO: 82.6 FL (ref 78–100)
MONOCYTES ABSOLUTE: 1.2 K/CU MM
MONOCYTES RELATIVE PERCENT: 14.3 % (ref 0–4)
PDW BLD-RTO: 19.1 % (ref 11.7–14.9)
PLATELET # BLD: 229 K/CU MM (ref 140–440)
PMV BLD AUTO: 9.1 FL (ref 7.5–11.1)
POTASSIUM SERPL-SCNC: 4.3 MMOL/L (ref 3.5–5.1)
RBC # BLD: 5.51 M/CU MM (ref 4.6–6.2)
SEGMENTED NEUTROPHILS ABSOLUTE COUNT: 4.9 K/CU MM
SEGMENTED NEUTROPHILS RELATIVE PERCENT: 59.5 % (ref 36–66)
SODIUM BLD-SCNC: 138 MMOL/L (ref 135–145)
TOTAL PROTEIN: 6.9 GM/DL (ref 6.4–8.2)
WBC # BLD: 8.2 K/CU MM (ref 4–10.5)

## 2023-04-27 PROCEDURE — 3017F COLORECTAL CA SCREEN DOC REV: CPT | Performed by: INTERNAL MEDICINE

## 2023-04-27 PROCEDURE — 85025 COMPLETE CBC W/AUTO DIFF WBC: CPT

## 2023-04-27 PROCEDURE — G8417 CALC BMI ABV UP PARAM F/U: HCPCS | Performed by: INTERNAL MEDICINE

## 2023-04-27 PROCEDURE — 1036F TOBACCO NON-USER: CPT | Performed by: INTERNAL MEDICINE

## 2023-04-27 PROCEDURE — 36415 COLL VENOUS BLD VENIPUNCTURE: CPT

## 2023-04-27 PROCEDURE — G8427 DOCREV CUR MEDS BY ELIG CLIN: HCPCS | Performed by: INTERNAL MEDICINE

## 2023-04-27 PROCEDURE — 99213 OFFICE O/P EST LOW 20 MIN: CPT | Performed by: INTERNAL MEDICINE

## 2023-04-27 PROCEDURE — 99211 OFF/OP EST MAY X REQ PHY/QHP: CPT

## 2023-04-27 PROCEDURE — 80053 COMPREHEN METABOLIC PANEL: CPT

## 2023-04-27 NOTE — PROGRESS NOTES
MA Rooming Questions  Patient: Baldomero Dean  MRN: [id number]    Date: 4/27/2023        1. Do you have any new issues? yes - nose bleeds on occ         2. Do you need any refills on medications?    no    3. Have you had any imaging done since your last visit?   no    4. Have you been hospitalized or seen in the emergency room since your last visit here?   yes - Feb     5. Did the patient have a depression screening completed today?  No    No data recorded     PHQ-9 Given to (if applicable):               PHQ-9 Score (if applicable):                     [] Positive     []  Negative              Does question #9 need addressed (if applicable)                     [] Yes    []  No               Romeo Juarez MA

## 2023-04-28 ENCOUNTER — CARE COORDINATION (OUTPATIENT)
Dept: CASE MANAGEMENT | Age: 63
End: 2023-04-28

## 2023-04-28 ENCOUNTER — CARE COORDINATION (OUTPATIENT)
Dept: CARE COORDINATION | Age: 63
End: 2023-04-28

## 2023-04-28 NOTE — CARE COORDINATION
Remote Patient Monitoring Note      Date/Time:  4/28/2023 2:00 PM    RN attempted to   contact patient by telephone regarding yellow alert received for no metrics for 2 days. Background:   Pt enrolled in RPM fro COPD and DM  Clinical Interventions:  letf message asking for pt to log metrics. Plan/Follow Up: Will continue to review, monitor and address alerts with follow up based on severity of symptoms and risk factors.       Current Patient Metrics ---- Blood Pressure: -/-, -bpm Glucose: -mg/dl Pulseox: -%, -bpm Survey: - Weight: -lbs Note Created at: 04/28/2023 02:02 PM ET ---- Time-Spent: 3 minutes 0 seconds

## 2023-04-28 NOTE — CARE COORDINATION
Attempted to reach patient for ACM follow up. No answer to phone. Message left with ACM contact information and request for call back. Reviewed RPM flowsheet.   4/27/23 : Height: 5' 10\" (1.778 m), Weight: 215 lb 6.4 oz (97.7 kg), BP: (!) 141/66  4/26/138/73, 4/24/23 121/69    Plan for next outreach:  RPM, revisit ACP discussion

## 2023-05-03 DIAGNOSIS — E11.65 UNCONTROLLED TYPE 2 DIABETES MELLITUS WITH HYPERGLYCEMIA (HCC): ICD-10-CM

## 2023-05-03 RX ORDER — PREDNISONE 5 MG/1
TABLET ORAL
Qty: 38 TABLET | Refills: 0 | OUTPATIENT
Start: 2023-05-03

## 2023-05-04 RX ORDER — LOSARTAN POTASSIUM 25 MG/1
TABLET ORAL
Qty: 90 TABLET | Refills: 1 | Status: SHIPPED | OUTPATIENT
Start: 2023-05-04

## 2023-05-04 RX ORDER — GLIMEPIRIDE 2 MG/1
TABLET ORAL
Qty: 180 TABLET | Refills: 3 | Status: SHIPPED | OUTPATIENT
Start: 2023-05-04

## 2023-05-05 ENCOUNTER — CARE COORDINATION (OUTPATIENT)
Dept: CARE COORDINATION | Age: 63
End: 2023-05-05

## 2023-05-05 ENCOUNTER — CARE COORDINATION (OUTPATIENT)
Dept: CASE MANAGEMENT | Age: 63
End: 2023-05-05

## 2023-05-05 NOTE — CARE COORDINATION
Remote Patient Monitoring Note      Date/Time:  5/5/2023 1:19 PM    LPN reviewed patients reported daily Remote Patient Monitoring metrics. All reported metrics are within alert parameters. Plan/Follow Up: Will continue to review, monitor and address alerts with follow up based on severity of symptoms and risk factors.     Current Patient Metrics ---- Blood Pressure: 136/82, 93bpm Glucose: 156mg/dl Pulseox: 93%, 82bpm Survey: C Weight: 211.9lbs Note Created at: 05/05/2023 01:20 PM ET ---- Time-Spent: 3 minutes 0 seconds

## 2023-05-05 NOTE — CARE COORDINATION
Ambulatory Care Coordination Note  2023    Patient Current Location:  Home: [address] [place] [id number]     ACM contacted the patient by telephone. Verified name and  with patient as identifiers. Provided introduction to self, and explanation of the ACM role. Challenges to be reviewed by the provider   Additional needs identified to be addressed with provider: No  none               Method of communication with provider: none. ACM: Asia Tolbert RN        Spoke with patient for ACM follow up. Patient reports that he is doing well. Reviewed RPM metrics. Weight - Scale: 211 lb 14.4 oz (96.1 kg), BP: 136/82     COPD/ CHF zone tools reviewed. Heart Failure Education outreach Date/Time: 2023 3:09 PM    Ambulatory Care Manager (ACM) contacted the patient by telephone to perform Ambulatory Care Coordination. Verified name and  with patient as identifiers. Provided introduction to self, and explanation of the Ambulatory Care Manager's role. ACM reviewed that a Health Healthy tips for the Summer packet has been sent to [employer]. ACM reviewed CHF zones, daily weights, fluid restriction, the importance of low sodium diet, and healthy tips packet with the patient. Instructed patient to call their PCP if they have a weight gain of 3 lbs in 2 days or 5 lbs in a week. Patient reminded that there is a physician on call 24 hours a day / 7 days a week should the patient have questions or concerns. The patient verbalized understanding. Offered patient enrollment in the Remote Patient Monitoring (RPM) program for in-home monitoring: Yes, patient already enrolled. Spoke with Provider line to assist with connecting patient to a new PCP. Message to be sent to Provider. Follow up contact with patient to confirm plan for patient follow up for follow through.      Lab Results       None            Care Coordination Interventions    Referral from Primary Care Provider:

## 2023-05-08 ENCOUNTER — CARE COORDINATION (OUTPATIENT)
Dept: CASE MANAGEMENT | Age: 63
End: 2023-05-08

## 2023-05-08 NOTE — CARE COORDINATION
Remote Patient Monitoring Note      Date/Time:  5/8/2023 3:05 PM  Patient Current Location: [facility]  LPN attempted to contacted patient by telephone regarding yellow alert received for No VS for 2 days. Background: COPD, DM  Clinical Interventions: Reviewed and followed up on alerts and treatments-     Attempted to reach patient for RPM yellow Alert. Unable to reach patient. Left HIPAA Compliant message on Voice Mail to REMIND patient to put metrics in. Phone number left on Voice Mail to call back. Will continue to follow. Paula Reyes LPN    [phone]  [facility] / [place] [address] Coordinator       Plan/Follow Up: Will continue to review, monitor and address alerts with follow up based on severity of symptoms and risk factors.      Current Patient Metrics ---- Blood Pressure: -/-, -bpm Glucose: -mg/dl Pulseox: -%, -bpm Survey: - Weight: -lbs Note Created at: 05/08/2023 03:06 PM ET ---- Time-Spent: 2 minutes 0 seconds

## 2023-05-09 ENCOUNTER — CARE COORDINATION (OUTPATIENT)
Dept: CASE MANAGEMENT | Age: 63
End: 2023-05-09

## 2023-05-09 ENCOUNTER — TELEPHONE (OUTPATIENT)
Dept: FAMILY MEDICINE CLINIC | Age: 63
End: 2023-05-09

## 2023-05-09 NOTE — TELEPHONE ENCOUNTER
Per Central Scheduling request via message Erasmo Baeur would like to est care with MIGUEL Peters. Our office left a message requesting Erasmo Bauer return our office call to schedule an appointment.

## 2023-05-09 NOTE — CARE COORDINATION
Remote Patient Monitoring Note      Date/Time:  5/9/2023 3:24 PM  Patient Current Location: [facility]  LPN attempted to contacted patient by telephone regarding yellow alert received for No VS for 3 days. Background: COPD, DM  Clinical Interventions: Reviewed and followed up on alerts and treatments-     Attempted to reach patient for RPM yellow Alert. Unable to reach patient. Left HIPAA Compliant message on Voice Mail to REMIND patient to put metrics in. Phone number left on Voice Mail to call back. Sent ACJENN Anand that no VS for 3 days and no response. Will continue to follow. Reno Simmons LPN    [phone]  [facility] Coordinator       Plan/Follow Up: Will continue to review, monitor and address alerts with follow up based on severity of symptoms and risk factors.       Current Patient Metrics ---- Blood Pressure: -/-, -bpm Glucose: -mg/dl Pulseox: -%, -bpm Survey: - Weight: -lbs Note Created at: 05/09/2023 03:26 PM ET ---- Time-Spent: 2 minutes 0 seconds

## 2023-05-10 ENCOUNTER — CARE COORDINATION (OUTPATIENT)
Dept: CARE COORDINATION | Age: 63
End: 2023-05-10

## 2023-05-15 ENCOUNTER — CARE COORDINATION (OUTPATIENT)
Dept: CARE COORDINATION | Age: 63
End: 2023-05-15

## 2023-05-15 ENCOUNTER — CARE COORDINATION (OUTPATIENT)
Dept: CASE MANAGEMENT | Age: 63
End: 2023-05-15

## 2023-05-15 NOTE — CARE COORDINATION
Attempted to reach patient for ACM follow up;  RPM red alert. No answer to phone. Message left with ACM contact information and request for call back.

## 2023-05-15 NOTE — CARE COORDINATION
Remote Alert Monitoring Note      Date/Time:  5/15/2023 11:54 AM  Patient Current Location: [facility]    LPN attempted to contacted patient by telephone regarding red alert received for blood pressure reading (127/112). Attempted X 2 to reach patient for RPM Red Alert Call. Unable to reach patient. Left HIPAA Compliant message on Voice Mail to call. Phone number left on Voice Mail to call back. Will continue to follow. Sent ACM message    Marcella Patel    [phone]  [facility] / [facility] Coordinator      Background: COPD, DM  Refer to 911 immediately if:  Patient unresponsive or unable to provide history  Change in cognition or sudden confusion  Patient unable to respond in complete sentences  Intense chest pain/tightness  Any concern for any clinical emergency  Red Alert: Provider response time of 1 hr required for any red alert requiring intervention  Yellow Alert: Provider response time of 3hr required for any escalated yellow alert    Plan/Follow Up: Will continue to review, monitor and address alerts with follow up based on severity of symptoms and risk factors.     Current Patient Metrics ---- Blood Pressure: 127/112, 86bpm Glucose: 106mg/dl Pulseox: 94%, 88bpm Survey: C Weight: 211.9lbs Note Created at: 05/15/2023 02:31 PM ET ---- Time-Spent: 3 minutes 0 seconds

## 2023-05-16 VITALS
BODY MASS INDEX: 30.4 KG/M2 | SYSTOLIC BLOOD PRESSURE: 127 MMHG | OXYGEN SATURATION: 94 % | DIASTOLIC BLOOD PRESSURE: 112 MMHG | HEART RATE: 88 BPM | WEIGHT: 211.9 LBS

## 2023-05-17 ENCOUNTER — CARE COORDINATION (OUTPATIENT)
Dept: CARE COORDINATION | Age: 63
End: 2023-05-17

## 2023-05-17 ENCOUNTER — CARE COORDINATION (OUTPATIENT)
Dept: CASE MANAGEMENT | Age: 63
End: 2023-05-17

## 2023-05-17 NOTE — CARE COORDINATION
Remote Patient Monitoring Note      Date/Time:  5/17/2023 3:41 PM  Patient Current Location: [facility]  LPN attempted to contacted patient by telephone regarding yellow alert received for No VS for 2 days. Background: COPD, DM  Clinical Interventions: Reviewed and followed up on alerts and treatments-     Attempted to reach patient for RPM yellow Alert. Unable to reach patient. Left HIPAA Compliant message on Voice Mail to REMIND patient to put metrics in. Phone number left on Voice Mail to call back. Will continue to follow. Sent message to Rudolph BRANCH LPN    [phone]  [facility] / [facility] Coordinator       Plan/Follow Up: Will continue to review, monitor and address alerts with follow up based on severity of symptoms and risk factors.      Current Patient Metrics ---- Blood Pressure: -/-, -bpm Glucose: -mg/dl Pulseox: -%, -bpm Survey: - Weight: -lbs Note Created at: 05/17/2023 03:43 PM ET ---- Time-Spent: 2 minutes 0 seconds

## 2023-05-17 NOTE — CARE COORDINATION
Attempted to reach patient for ACM follow up. No answer to phone. Message left with ACM contact information and request for call back. Reviewed RPM.  Red alert noted. No VS since 5/15.

## 2023-05-18 ENCOUNTER — CARE COORDINATION (OUTPATIENT)
Dept: CASE MANAGEMENT | Age: 63
End: 2023-05-18

## 2023-05-19 ENCOUNTER — CARE COORDINATION (OUTPATIENT)
Dept: CARE COORDINATION | Age: 63
End: 2023-05-19

## 2023-05-19 DIAGNOSIS — J44.9 CHRONIC OBSTRUCTIVE PULMONARY DISEASE, UNSPECIFIED COPD TYPE (HCC): Primary | ICD-10-CM

## 2023-05-19 DIAGNOSIS — E11.9 CONTROLLED TYPE 2 DIABETES MELLITUS WITHOUT COMPLICATION, WITHOUT LONG-TERM CURRENT USE OF INSULIN (HCC): ICD-10-CM

## 2023-05-19 NOTE — CARE COORDINATION
Attempted to reach patient for ACM follow up. No answer to phone. Message left with ACM contact information and request for call back. Third attempt at outreach : no VS x 4 days; Request to be made for RPM d/c.

## 2023-05-19 NOTE — CARE COORDINATION
I placed the return order for the patients RPM kit as requested from the [facility]. I left the patient a message letting him know.       [facility]   Medication Assistance  [address] [place] [address], and [employer]    U) [phone] (e) [phone]

## 2023-05-19 NOTE — PROGRESS NOTES
5/19/23 9:47 AM     Remote Patient Order Discontinued    Received request from Raven Gomez RN  to discontinue order for remote patient monitoring of COPD and Diabetes and order completed.      Nakul Stern DNP, FNP-C, Remote Patient Monitoring NP, () [phone]

## 2023-05-24 ENCOUNTER — CARE COORDINATION (OUTPATIENT)
Dept: CARE COORDINATION | Age: 63
End: 2023-05-24

## 2023-06-01 ENCOUNTER — HOSPITAL ENCOUNTER (OUTPATIENT)
Dept: CT IMAGING | Age: 63
Discharge: HOME | End: 2023-06-01
Payer: MEDICARE

## 2023-06-01 DIAGNOSIS — F17.210 CONTINUOUS DEPENDENCE ON CIGARETTE SMOKING: ICD-10-CM

## 2023-06-01 DIAGNOSIS — D75.1 SECONDARY POLYCYTHEMIA: ICD-10-CM

## 2023-06-01 PROCEDURE — 71271 CT THORAX LUNG CANCER SCR C-: CPT

## 2023-06-06 ENCOUNTER — NURSE ONLY (OUTPATIENT)
Dept: PULMONOLOGY | Age: 63
End: 2023-06-06
Payer: MEDICARE

## 2023-06-06 VITALS — BODY MASS INDEX: 30.28 KG/M2 | WEIGHT: 211 LBS

## 2023-06-06 DIAGNOSIS — J98.4 RESTRICTIVE LUNG DISEASE: Primary | ICD-10-CM

## 2023-06-06 DIAGNOSIS — J42 CHRONIC BRONCHITIS, UNSPECIFIED CHRONIC BRONCHITIS TYPE (HCC): ICD-10-CM

## 2023-06-06 DIAGNOSIS — J44.9 CHRONIC OBSTRUCTIVE PULMONARY DISEASE, UNSPECIFIED COPD TYPE (HCC): ICD-10-CM

## 2023-06-06 DIAGNOSIS — J96.11 CHRONIC HYPOXEMIC RESPIRATORY FAILURE (HCC): ICD-10-CM

## 2023-06-06 DIAGNOSIS — Z87.891 FORMER SMOKER: ICD-10-CM

## 2023-06-06 DIAGNOSIS — R06.02 SHORTNESS OF BREATH: ICD-10-CM

## 2023-06-06 DIAGNOSIS — J98.4 RESTRICTIVE LUNG DISEASE: ICD-10-CM

## 2023-06-06 LAB
EXPIRATORY TIME-POST: NORMAL
EXPIRATORY TIME: NORMAL
FEF 25-75% %CHNG: NORMAL
FEF 25-75% %PRED-POST: NORMAL
FEF 25-75% %PRED-PRE: NORMAL
FEF 25-75% PRED: NORMAL
FEF 25-75%-POST: NORMAL
FEF 25-75%-PRE: NORMAL
FEV1 %PRED-POST: 29.7 %
FEV1 %PRED-PRE: 25.4 %
FEV1 PRED: 3.54 L
FEV1-POST: 1.05 L
FEV1-PRE: 0.9 L
FEV1/FVC %PRED-POST: 109.8 %
FEV1/FVC %PRED-PRE: 104.7 %
FEV1/FVC PRED: 75.2 %
FEV1/FVC-POST: 82.6 %
FEV1/FVC-PRE: 78.7 %
FVC %PRED-POST: 27 L
FVC %PRED-PRE: 24.3 %
FVC PRED: 4.7 L
FVC-POST: 1.27 L
FVC-PRE: 1.14 L
PEF %PRED-POST: NORMAL
PEF %PRED-PRE: NORMAL
PEF PRED: NORMAL
PEF%CHNG: NORMAL
PEF-POST: NORMAL
PEF-PRE: NORMAL

## 2023-06-06 PROCEDURE — 99213 OFFICE O/P EST LOW 20 MIN: CPT | Performed by: INTERNAL MEDICINE

## 2023-06-06 ASSESSMENT — PULMONARY FUNCTION TESTS
FVC_PERCENT_PREDICTED_POST: 27.0
FEV1/FVC_POST: 82.6
FEV1_PRE: 0.90
FEV1/FVC_PERCENT_PREDICTED_POST: 109.8
FVC_PREDICTED: 4.70
FEV1/FVC_PRE: 78.7
FEV1_PERCENT_PREDICTED_POST: 29.7
FEV1/FVC_PERCENT_PREDICTED_PRE: 104.7
FEV1_PREDICTED: 3.54
FEV1_POST: 1.05
FEV1/FVC_PREDICTED: 75.2
FVC_PRE: 1.14
FVC_PERCENT_PREDICTED_PRE: 24.3
FVC_POST: 1.27
FEV1_PERCENT_PREDICTED_PRE: 25.4

## 2023-06-06 NOTE — PROGRESS NOTES
pulmonary edema. No pleural effusion or pneumothorax. Upper Abdomen:   Limited images of the upper abdomen demonstrate no acute  abnormality. No focal adrenal nodules. Soft Tissues/Bones:  Age related degenerative changes of the visualized  osseous structures without focal destructive lesion. IMPRESSION:  Findings suggesting interstitial pulmonary fibrosis. Differential includes  NSIP and UIP. No acute process. No suspicious pulmonary nodules  ASSESSMENT:    1. Restrictive lung disease    2. Chronic bronchitis, unspecified chronic bronchitis type ([address]    3. Chronic hypoxemic respiratory failure (HCC)    4. Shortness of breath    5. Former smoker          PLAN:  I am concerned that he is developing pulmonary fibrosis. I have called [address] [place] [address] radiology to do a comparison study with his most recent low-dose CT lung screen. If they do confirm that he has developed pulmonary fibrosis then I will obtain a high-resolution CT chest in the near future. I may consider referring him to Dr. Calista Holly for evaluation and treatment of pulmonary fibrosis. [facility] will continue to follow him    We have discussed the need to maintain yearly flu immunization, pneumococcal vaccination and coronavirus vaccination. We have discussed Coronavirus precaution including handwashing practice, wiping items touched in public such as gas pumps, door handles, shopping carts, etc. Self monitoring for infection - fever, chills, cough, SOB. Should they develop symptoms they should call office for further instructions. Return in about 3 months (around 9/6/2023) for Recheck for Pulm Fibrosis, Recheck for Shortness of Breath, chronic hypoxemic respiratory failure. This dictation was performed with a verbal recognition program and it was checked for errors. It is possible that there are still dictated errors within this office note. Any errors should be brought immediately to my attention for correction.   All

## 2023-10-03 ENCOUNTER — OFFICE VISIT (OUTPATIENT)
Dept: PULMONOLOGY | Age: 63
End: 2023-10-03
Payer: MEDICARE

## 2023-10-03 VITALS
BODY MASS INDEX: 33.25 KG/M2 | HEIGHT: 69 IN | SYSTOLIC BLOOD PRESSURE: 124 MMHG | DIASTOLIC BLOOD PRESSURE: 64 MMHG | HEART RATE: 107 BPM | OXYGEN SATURATION: 90 % | WEIGHT: 224.5 LBS

## 2023-10-03 DIAGNOSIS — J42 CHRONIC BRONCHITIS, UNSPECIFIED CHRONIC BRONCHITIS TYPE (HCC): Primary | ICD-10-CM

## 2023-10-03 DIAGNOSIS — J98.4 RESTRICTIVE LUNG DISEASE: ICD-10-CM

## 2023-10-03 DIAGNOSIS — Z87.891 FORMER SMOKER: ICD-10-CM

## 2023-10-03 DIAGNOSIS — D75.1 SECONDARY POLYCYTHEMIA: ICD-10-CM

## 2023-10-03 DIAGNOSIS — R06.02 SHORTNESS OF BREATH: ICD-10-CM

## 2023-10-03 DIAGNOSIS — J96.11 CHRONIC HYPOXEMIC RESPIRATORY FAILURE (HCC): ICD-10-CM

## 2023-10-03 DIAGNOSIS — R91.1 LUNG NODULE: ICD-10-CM

## 2023-10-03 PROCEDURE — 99213 OFFICE O/P EST LOW 20 MIN: CPT | Performed by: INTERNAL MEDICINE

## 2023-10-03 PROCEDURE — G8484 FLU IMMUNIZE NO ADMIN: HCPCS | Performed by: INTERNAL MEDICINE

## 2023-10-03 PROCEDURE — 1036F TOBACCO NON-USER: CPT | Performed by: INTERNAL MEDICINE

## 2023-10-03 PROCEDURE — G8417 CALC BMI ABV UP PARAM F/U: HCPCS | Performed by: INTERNAL MEDICINE

## 2023-10-03 PROCEDURE — G8427 DOCREV CUR MEDS BY ELIG CLIN: HCPCS | Performed by: INTERNAL MEDICINE

## 2023-10-03 PROCEDURE — 3023F SPIROM DOC REV: CPT | Performed by: INTERNAL MEDICINE

## 2023-10-03 PROCEDURE — 3017F COLORECTAL CA SCREEN DOC REV: CPT | Performed by: INTERNAL MEDICINE

## 2023-10-03 RX ORDER — UMECLIDINIUM BROMIDE AND VILANTEROL TRIFENATATE 62.5; 25 UG/1; UG/1
1 POWDER RESPIRATORY (INHALATION) DAILY
Qty: 1 EACH | Refills: 11 | Status: SHIPPED | OUTPATIENT
Start: 2023-10-03

## 2023-10-19 ENCOUNTER — HOSPITAL ENCOUNTER (OUTPATIENT)
Dept: CT IMAGING | Age: 63
Discharge: HOME | End: 2023-10-19
Attending: INTERNAL MEDICINE
Payer: MEDICARE

## 2023-10-19 DIAGNOSIS — R06.02 SHORTNESS OF BREATH: ICD-10-CM

## 2023-10-19 DIAGNOSIS — J98.4 RESTRICTIVE LUNG DISEASE: ICD-10-CM

## 2023-10-19 DIAGNOSIS — J96.11 CHRONIC HYPOXEMIC RESPIRATORY FAILURE (HCC): ICD-10-CM

## 2023-10-19 DIAGNOSIS — Z87.891 FORMER SMOKER: ICD-10-CM

## 2023-10-19 DIAGNOSIS — J42 CHRONIC BRONCHITIS, UNSPECIFIED CHRONIC BRONCHITIS TYPE (HCC): ICD-10-CM

## 2023-10-19 DIAGNOSIS — D75.1 SECONDARY POLYCYTHEMIA: ICD-10-CM

## 2023-10-19 DIAGNOSIS — R91.1 LUNG NODULE: ICD-10-CM

## 2023-10-19 PROCEDURE — 71250 CT THORAX DX C-: CPT

## 2023-10-23 ENCOUNTER — OFFICE VISIT (OUTPATIENT)
Dept: ONCOLOGY | Age: 63
End: 2023-10-23
Payer: MEDICARE

## 2023-10-23 ENCOUNTER — HOSPITAL ENCOUNTER (OUTPATIENT)
Dept: INFUSION THERAPY | Age: 63
Discharge: HOME | End: 2023-10-23
Payer: MEDICARE

## 2023-10-23 VITALS
HEIGHT: 69 IN | WEIGHT: 221 LBS | OXYGEN SATURATION: 92 % | BODY MASS INDEX: 32.73 KG/M2 | RESPIRATION RATE: 20 BRPM | SYSTOLIC BLOOD PRESSURE: 161 MMHG | HEART RATE: 95 BPM | TEMPERATURE: 98.3 F | DIASTOLIC BLOOD PRESSURE: 75 MMHG

## 2023-10-23 DIAGNOSIS — D75.1 SECONDARY POLYCYTHEMIA: ICD-10-CM

## 2023-10-23 DIAGNOSIS — D75.1 SECONDARY POLYCYTHEMIA: Primary | ICD-10-CM

## 2023-10-23 LAB
BASOPHILS ABSOLUTE: 0.1 K/CU MM
BASOPHILS RELATIVE PERCENT: 1 % (ref 0–1)
DIFFERENTIAL TYPE: ABNORMAL
EOSINOPHILS ABSOLUTE: 0.5 K/CU MM
EOSINOPHILS RELATIVE PERCENT: 5.3 % (ref 0–3)
HCT VFR BLD CALC: 50.1 % (ref 42–52)
HEMOGLOBIN: 16 GM/DL (ref 13.5–18)
LYMPHOCYTES ABSOLUTE: 1.8 K/CU MM
LYMPHOCYTES RELATIVE PERCENT: 18.4 % (ref 24–44)
MCH RBC QN AUTO: 26.8 PG (ref 27–31)
MCHC RBC AUTO-ENTMCNC: 31.9 % (ref 32–36)
MCV RBC AUTO: 83.8 FL (ref 78–100)
MONOCYTES ABSOLUTE: 1.2 K/CU MM
MONOCYTES RELATIVE PERCENT: 12.7 % (ref 0–4)
PDW BLD-RTO: 16.2 % (ref 11.7–14.9)
PLATELET # BLD: 216 K/CU MM (ref 140–440)
PMV BLD AUTO: 9 FL (ref 7.5–11.1)
RBC # BLD: 5.98 M/CU MM (ref 4.6–6.2)
SEGMENTED NEUTROPHILS ABSOLUTE COUNT: 6.1 K/CU MM
SEGMENTED NEUTROPHILS RELATIVE PERCENT: 62.6 % (ref 36–66)
WBC # BLD: 9.8 K/CU MM (ref 4–10.5)

## 2023-10-23 PROCEDURE — 36415 COLL VENOUS BLD VENIPUNCTURE: CPT

## 2023-10-23 PROCEDURE — G8427 DOCREV CUR MEDS BY ELIG CLIN: HCPCS | Performed by: INTERNAL MEDICINE

## 2023-10-23 PROCEDURE — 1036F TOBACCO NON-USER: CPT | Performed by: INTERNAL MEDICINE

## 2023-10-23 PROCEDURE — 99211 OFF/OP EST MAY X REQ PHY/QHP: CPT

## 2023-10-23 PROCEDURE — 99213 OFFICE O/P EST LOW 20 MIN: CPT | Performed by: INTERNAL MEDICINE

## 2023-10-23 PROCEDURE — G8484 FLU IMMUNIZE NO ADMIN: HCPCS | Performed by: INTERNAL MEDICINE

## 2023-10-23 PROCEDURE — 3017F COLORECTAL CA SCREEN DOC REV: CPT | Performed by: INTERNAL MEDICINE

## 2023-10-23 PROCEDURE — 85025 COMPLETE CBC W/AUTO DIFF WBC: CPT

## 2023-10-23 PROCEDURE — G8417 CALC BMI ABV UP PARAM F/U: HCPCS | Performed by: INTERNAL MEDICINE

## 2023-10-23 ASSESSMENT — PATIENT HEALTH QUESTIONNAIRE - PHQ9
SUM OF ALL RESPONSES TO PHQ QUESTIONS 1-9: 2
2. FEELING DOWN, DEPRESSED OR HOPELESS: 1
SUM OF ALL RESPONSES TO PHQ QUESTIONS 1-9: 2
SUM OF ALL RESPONSES TO PHQ9 QUESTIONS 1 & 2: 2
1. LITTLE INTEREST OR PLEASURE IN DOING THINGS: 1

## 2023-10-23 NOTE — PROGRESS NOTES
MA Rooming Questions  Patient: Niurka Shah  MRN: [id number]    Date: 10/23/2023        1. Do you have any new issues? yes - More SOB         2. Do you need any refills on medications?    no    3. Have you had any imaging done since your last visit? yes - CT    4. Have you been hospitalized or seen in the emergency room since your last visit here?   no    5. Did the patient have a depression screening completed today?  Yes    No data recorded     PHQ-9 Given to (if applicable):               PHQ-9 Score (if applicable):                     [] Positive     []  Negative              Does question #9 need addressed (if applicable)                     [] Yes    []  No               Kanwal Barakat CMA
findings or new suspicious pulmonary nodules. Dilated main pulmonary artery which can be seen with pulmonary hypertension. PET CT scan done on 6/20/2022 showed no focal activity in the right lower lobe. There was question of a nodule on the recent CT scan. Findings are favored to be benign and related to resolving consolidation with probable atelectasis or scarring. Attention on follow-up imaging. CT chest on 10/19/23 showed redemonstration of chronic interstitial lung findings, predominantly in the upper lobes, overall favoring NSIP. No honeycombing or bronchiectasis. Chronic subsegmental scarring in the right lung base. On October 23, 2023, he presented to me for follow up. 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 He stated that his symptoms has improved some with phlebotomy. He has been under observation since then. CT lung screening done on May 31, 2022 was reviewed with him. It showed nodular consolidation in RLL. I reviewed with him findings on PET/CT scan done on 6/20/22. Since PET/CT scan findings are favored to be benign process, I recommend for observation. Reviewed findings on CT scan done on 10/19/23. There is no evidence of suspicious lung lesion. He has been following with pulmonary and I recommend him to continue to f/u with pulmonary. I recognized that his hematocrit today was 50.1% and I recommend for observation. He doesn't need phlebotomy this time. Since his symptoms improve with phlebotomy, I will recommend to keep his hematocrit less than 52%. He stopped smoking since 1/1/2021. Otherwise, I will see him back in 6 months. I answered all his questions and concerns for today. Recent imaging and labs were reviewed and discussed with the patient.

## 2024-01-06 ENCOUNTER — APPOINTMENT (OUTPATIENT)
Dept: GENERAL RADIOLOGY | Age: 64
DRG: 189 | End: 2024-01-06
Payer: MEDICARE

## 2024-01-06 ENCOUNTER — HOSPITAL ENCOUNTER (INPATIENT)
Age: 64
LOS: 2 days | Discharge: HOME | DRG: 189 | End: 2024-01-09
Attending: EMERGENCY MEDICINE | Admitting: STUDENT IN AN ORGANIZED HEALTH CARE EDUCATION/TRAINING PROGRAM
Payer: MEDICARE

## 2024-01-06 DIAGNOSIS — R06.89 CO2 NARCOSIS: Primary | ICD-10-CM

## 2024-01-06 DIAGNOSIS — J44.1 COPD EXACERBATION (HCC): ICD-10-CM

## 2024-01-06 LAB
BASE EXCESS MIXED: 13.1 (ref 0–3)
BASOPHILS ABSOLUTE: 0.1 K/CU MM
BASOPHILS RELATIVE PERCENT: 1.8 % (ref 0–1)
CARBON MONOXIDE, BLOOD: 2.3 % (ref 0–5)
CO2 CONTENT: 47.1 MMOL/L (ref 21–32)
DIFFERENTIAL TYPE: ABNORMAL
EOSINOPHILS ABSOLUTE: 0.4 K/CU MM
EOSINOPHILS RELATIVE PERCENT: 4.9 % (ref 0–3)
HCO3 ARTERIAL: 44.2 MMOL/L (ref 21–28)
HCT VFR BLD CALC: 55.3 % (ref 42–52)
HEMOGLOBIN: 16.8 GM/DL (ref 13.5–18)
IMMATURE NEUTROPHIL %: 0.3 % (ref 0–0.43)
LYMPHOCYTES ABSOLUTE: 1.6 K/CU MM
LYMPHOCYTES RELATIVE PERCENT: 19.8 % (ref 24–44)
MCH RBC QN AUTO: 26.7 PG (ref 27–31)
MCHC RBC AUTO-ENTMCNC: 30.4 % (ref 32–36)
MCV RBC AUTO: 87.9 FL (ref 78–100)
METHEMOGLOBIN ARTERIAL: 1.1 %
MONOCYTES ABSOLUTE: 0.9 K/CU MM
MONOCYTES RELATIVE PERCENT: 11 % (ref 0–4)
NUCLEATED RBC %: 0 %
O2 SATURATION: 88 % (ref 94–98)
PCO2 ARTERIAL: 94 MMHG (ref 35–48)
PDW BLD-RTO: 15.9 % (ref 11.7–14.9)
PH BLOOD: 7.28 (ref 7.35–7.45)
PLATELET # BLD: 216 K/CU MM (ref 140–440)
PMV BLD AUTO: 8.8 FL (ref 7.5–11.1)
PO2 ARTERIAL: 66 MMHG (ref 83–108)
RBC # BLD: 6.29 M/CU MM (ref 4.6–6.2)
SEGMENTED NEUTROPHILS ABSOLUTE COUNT: 5 K/CU MM
SEGMENTED NEUTROPHILS RELATIVE PERCENT: 62.2 % (ref 36–66)
TOTAL IMMATURE NEUTOROPHIL: 0.02 K/CU MM
TOTAL NUCLEATED RBC: 0 K/CU MM
WBC # BLD: 8 K/CU MM (ref 4–10.5)

## 2024-01-06 PROCEDURE — 71045 X-RAY EXAM CHEST 1 VIEW: CPT

## 2024-01-06 PROCEDURE — 80053 COMPREHEN METABOLIC PANEL: CPT

## 2024-01-06 PROCEDURE — 82803 BLOOD GASES ANY COMBINATION: CPT

## 2024-01-06 PROCEDURE — 99291 CRITICAL CARE FIRST HOUR: CPT

## 2024-01-06 PROCEDURE — 6370000000 HC RX 637 (ALT 250 FOR IP): Performed by: EMERGENCY MEDICINE

## 2024-01-06 PROCEDURE — 84484 ASSAY OF TROPONIN QUANT: CPT

## 2024-01-06 PROCEDURE — 36600 WITHDRAWAL OF ARTERIAL BLOOD: CPT

## 2024-01-06 PROCEDURE — 94640 AIRWAY INHALATION TREATMENT: CPT

## 2024-01-06 PROCEDURE — 85025 COMPLETE CBC W/AUTO DIFF WBC: CPT

## 2024-01-06 RX ORDER — IPRATROPIUM BROMIDE AND ALBUTEROL SULFATE 2.5; .5 MG/3ML; MG/3ML
1 SOLUTION RESPIRATORY (INHALATION) 2 TIMES DAILY
Status: DISCONTINUED | OUTPATIENT
Start: 2024-01-06 | End: 2024-01-07 | Stop reason: SDUPTHER

## 2024-01-06 RX ADMIN — IPRATROPIUM BROMIDE AND ALBUTEROL SULFATE 1 DOSE: 2.5; .5 SOLUTION RESPIRATORY (INHALATION) at 23:38

## 2024-01-07 PROBLEM — J44.1 COPD EXACERBATION (HCC): Status: ACTIVE | Noted: 2024-01-07

## 2024-01-07 LAB
ALBUMIN SERPL-MCNC: 4.1 GM/DL (ref 3.4–5)
ALBUMIN SERPL-MCNC: 4.4 GM/DL (ref 3.4–5)
ALP BLD-CCNC: 78 IU/L (ref 40–129)
ALP BLD-CCNC: 83 IU/L (ref 40–128)
ALT SERPL-CCNC: 10 U/L (ref 10–40)
ALT SERPL-CCNC: 11 U/L (ref 10–40)
ANION GAP SERPL CALCULATED.3IONS-SCNC: 6 MMOL/L (ref 7–16)
ANION GAP SERPL CALCULATED.3IONS-SCNC: 9 MMOL/L (ref 7–16)
AST SERPL-CCNC: 11 IU/L (ref 15–37)
AST SERPL-CCNC: 14 IU/L (ref 15–37)
B PARAP IS1001 DNA NPH QL NAA+NON-PROBE: NOT DETECTED
B PERT.PT PRMT NPH QL NAA+NON-PROBE: NOT DETECTED
BASE EXCESS MIXED: 10.3 (ref 0–3)
BASOPHILS ABSOLUTE: 0 K/CU MM
BASOPHILS RELATIVE PERCENT: 0.4 % (ref 0–1)
BILIRUB SERPL-MCNC: 0.3 MG/DL (ref 0–1)
BILIRUB SERPL-MCNC: 0.3 MG/DL (ref 0–1)
BUN SERPL-MCNC: 12 MG/DL (ref 6–23)
BUN SERPL-MCNC: 15 MG/DL (ref 6–23)
C PNEUM DNA NPH QL NAA+NON-PROBE: NOT DETECTED
CALCIUM SERPL-MCNC: 8.7 MG/DL (ref 8.3–10.6)
CALCIUM SERPL-MCNC: 9 MG/DL (ref 8.3–10.6)
CARBON MONOXIDE, BLOOD: 1.9 % (ref 0–5)
CHLORIDE BLD-SCNC: 90 MMOL/L (ref 99–110)
CHLORIDE BLD-SCNC: 94 MMOL/L (ref 99–110)
CO2 CONTENT: 42.1 MMOL/L (ref 21–32)
CO2: 33 MMOL/L (ref 21–32)
CO2: 38 MMOL/L (ref 21–32)
CREAT SERPL-MCNC: 0.9 MG/DL (ref 0.9–1.3)
CREAT SERPL-MCNC: 0.9 MG/DL (ref 0.9–1.3)
DIFFERENTIAL TYPE: ABNORMAL
EOSINOPHILS ABSOLUTE: 0 K/CU MM
EOSINOPHILS RELATIVE PERCENT: 0 % (ref 0–3)
ESTIMATED AVERAGE GLUCOSE: 212 MG/DL
FLUAV H1 2009 PAN RNA NPH NAA+NON-PROBE: NOT DETECTED
FLUAV H1 RNA NPH QL NAA+NON-PROBE: NOT DETECTED
FLUAV H3 RNA NPH QL NAA+NON-PROBE: NOT DETECTED
FLUAV RNA NPH QL NAA+NON-PROBE: NOT DETECTED
FLUBV RNA NPH QL NAA+NON-PROBE: NOT DETECTED
GFR SERPL CREATININE-BSD FRML MDRD: >60 ML/MIN/1.73M2
GFR SERPL CREATININE-BSD FRML MDRD: >60 ML/MIN/1.73M2
GLUCOSE BLD-MCNC: 216 MG/DL (ref 70–99)
GLUCOSE BLD-MCNC: 267 MG/DL (ref 70–99)
GLUCOSE BLD-MCNC: 267 MG/DL (ref 70–99)
GLUCOSE BLD-MCNC: 365 MG/DL (ref 70–99)
GLUCOSE SERPL-MCNC: 308 MG/DL (ref 70–99)
GLUCOSE SERPL-MCNC: 350 MG/DL (ref 70–99)
HADV DNA NPH QL NAA+NON-PROBE: NOT DETECTED
HBA1C MFR BLD: 9 % (ref 4.2–6.3)
HCO3 ARTERIAL: 39.7 MMOL/L (ref 21–28)
HCOV 229E RNA NPH QL NAA+NON-PROBE: NOT DETECTED
HCOV HKU1 RNA NPH QL NAA+NON-PROBE: NOT DETECTED
HCOV NL63 RNA NPH QL NAA+NON-PROBE: NOT DETECTED
HCOV OC43 RNA NPH QL NAA+NON-PROBE: NOT DETECTED
HCT VFR BLD CALC: 52.2 % (ref 42–52)
HEMOGLOBIN: 16.4 GM/DL (ref 13.5–18)
HMPV RNA NPH QL NAA+NON-PROBE: NOT DETECTED
HPIV1 RNA NPH QL NAA+NON-PROBE: NOT DETECTED
HPIV2 RNA NPH QL NAA+NON-PROBE: NOT DETECTED
HPIV3 RNA NPH QL NAA+NON-PROBE: NOT DETECTED
HPIV4 RNA NPH QL NAA+NON-PROBE: NOT DETECTED
IMMATURE NEUTROPHIL %: 0.4 % (ref 0–0.43)
INR BLD: 1 INDEX
LYMPHOCYTES ABSOLUTE: 0.5 K/CU MM
LYMPHOCYTES RELATIVE PERCENT: 4.7 % (ref 24–44)
M PNEUMO DNA NPH QL NAA+NON-PROBE: NOT DETECTED
MCH RBC QN AUTO: 27.2 PG (ref 27–31)
MCHC RBC AUTO-ENTMCNC: 31.4 % (ref 32–36)
MCV RBC AUTO: 86.6 FL (ref 78–100)
METHEMOGLOBIN ARTERIAL: 1.2 %
MONOCYTES ABSOLUTE: 0.3 K/CU MM
MONOCYTES RELATIVE PERCENT: 2.4 % (ref 0–4)
NUCLEATED RBC %: 0 %
O2 SATURATION: 93.4 % (ref 94–98)
PCO2 ARTERIAL: 77 MMHG (ref 35–48)
PDW BLD-RTO: 15.9 % (ref 11.7–14.9)
PH BLOOD: 7.32 (ref 7.35–7.45)
PLATELET # BLD: 223 K/CU MM (ref 140–440)
PMV BLD AUTO: 8.9 FL (ref 7.5–11.1)
PO2 ARTERIAL: 80 MMHG (ref 83–108)
POTASSIUM SERPL-SCNC: 4.4 MMOL/L (ref 3.5–5.1)
POTASSIUM SERPL-SCNC: 4.6 MMOL/L (ref 3.5–5.1)
PROTHROMBIN TIME: 13.7 SECONDS (ref 11.7–14.5)
RBC # BLD: 6.03 M/CU MM (ref 4.6–6.2)
RSV RNA NPH QL NAA+NON-PROBE: NOT DETECTED
RV+EV RNA NPH QL NAA+NON-PROBE: NOT DETECTED
SARS-COV-2 RNA NPH QL NAA+NON-PROBE: NOT DETECTED
SEGMENTED NEUTROPHILS ABSOLUTE COUNT: 9.4 K/CU MM
SEGMENTED NEUTROPHILS RELATIVE PERCENT: 92.1 % (ref 36–66)
SODIUM BLD-SCNC: 134 MMOL/L (ref 135–145)
SODIUM BLD-SCNC: 136 MMOL/L (ref 135–145)
TOTAL IMMATURE NEUTOROPHIL: 0.04 K/CU MM
TOTAL NUCLEATED RBC: 0 K/CU MM
TOTAL PROTEIN: 7.2 GM/DL (ref 6.4–8.2)
TOTAL PROTEIN: 7.9 GM/DL (ref 6.4–8.2)
TROPONIN, HIGH SENSITIVITY: 23 NG/L (ref 0–22)
TROPONIN, HIGH SENSITIVITY: 27 NG/L (ref 0–22)
WBC # BLD: 10.2 K/CU MM (ref 4–10.5)

## 2024-01-07 PROCEDURE — 94664 DEMO&/EVAL PT USE INHALER: CPT

## 2024-01-07 PROCEDURE — 6370000000 HC RX 637 (ALT 250 FOR IP): Performed by: STUDENT IN AN ORGANIZED HEALTH CARE EDUCATION/TRAINING PROGRAM

## 2024-01-07 PROCEDURE — 6360000002 HC RX W HCPCS: Performed by: EMERGENCY MEDICINE

## 2024-01-07 PROCEDURE — 6370000000 HC RX 637 (ALT 250 FOR IP): Performed by: NURSE PRACTITIONER

## 2024-01-07 PROCEDURE — 0202U NFCT DS 22 TRGT SARS-COV-2: CPT

## 2024-01-07 PROCEDURE — 85025 COMPLETE CBC W/AUTO DIFF WBC: CPT

## 2024-01-07 PROCEDURE — 2060000000 HC ICU INTERMEDIATE R&B

## 2024-01-07 PROCEDURE — 94640 AIRWAY INHALATION TREATMENT: CPT

## 2024-01-07 PROCEDURE — 80053 COMPREHEN METABOLIC PANEL: CPT

## 2024-01-07 PROCEDURE — 2700000000 HC OXYGEN THERAPY PER DAY

## 2024-01-07 PROCEDURE — 2580000003 HC RX 258: Performed by: STUDENT IN AN ORGANIZED HEALTH CARE EDUCATION/TRAINING PROGRAM

## 2024-01-07 PROCEDURE — 36415 COLL VENOUS BLD VENIPUNCTURE: CPT

## 2024-01-07 PROCEDURE — 83036 HEMOGLOBIN GLYCOSYLATED A1C: CPT

## 2024-01-07 PROCEDURE — 6360000002 HC RX W HCPCS: Performed by: STUDENT IN AN ORGANIZED HEALTH CARE EDUCATION/TRAINING PROGRAM

## 2024-01-07 PROCEDURE — 82962 GLUCOSE BLOOD TEST: CPT

## 2024-01-07 PROCEDURE — 36600 WITHDRAWAL OF ARTERIAL BLOOD: CPT

## 2024-01-07 PROCEDURE — 84484 ASSAY OF TROPONIN QUANT: CPT

## 2024-01-07 PROCEDURE — 94761 N-INVAS EAR/PLS OXIMETRY MLT: CPT

## 2024-01-07 PROCEDURE — 82803 BLOOD GASES ANY COMBINATION: CPT

## 2024-01-07 PROCEDURE — 5A09357 ASSISTANCE WITH RESPIRATORY VENTILATION, LESS THAN 24 CONSECUTIVE HOURS, CONTINUOUS POSITIVE AIRWAY PRESSURE: ICD-10-PCS | Performed by: STUDENT IN AN ORGANIZED HEALTH CARE EDUCATION/TRAINING PROGRAM

## 2024-01-07 PROCEDURE — 85610 PROTHROMBIN TIME: CPT

## 2024-01-07 PROCEDURE — 96374 THER/PROPH/DIAG INJ IV PUSH: CPT

## 2024-01-07 RX ORDER — PREDNISONE 20 MG/1
40 TABLET ORAL DAILY
Status: DISCONTINUED | OUTPATIENT
Start: 2024-01-07 | End: 2024-01-09 | Stop reason: HOSPADM

## 2024-01-07 RX ORDER — INSULIN GLARGINE 100 [IU]/ML
0.25 INJECTION, SOLUTION SUBCUTANEOUS NIGHTLY
Status: DISCONTINUED | OUTPATIENT
Start: 2024-01-07 | End: 2024-01-07

## 2024-01-07 RX ORDER — SODIUM CHLORIDE 0.9 % (FLUSH) 0.9 %
5-40 SYRINGE (ML) INJECTION PRN
Status: DISCONTINUED | OUTPATIENT
Start: 2024-01-07 | End: 2024-01-09 | Stop reason: HOSPADM

## 2024-01-07 RX ORDER — POTASSIUM CHLORIDE 20 MEQ/1
40 TABLET, EXTENDED RELEASE ORAL PRN
Status: DISCONTINUED | OUTPATIENT
Start: 2024-01-07 | End: 2024-01-09 | Stop reason: HOSPADM

## 2024-01-07 RX ORDER — INSULIN LISPRO 100 [IU]/ML
0-4 INJECTION, SOLUTION INTRAVENOUS; SUBCUTANEOUS EVERY 4 HOURS
Status: DISCONTINUED | OUTPATIENT
Start: 2024-01-07 | End: 2024-01-07

## 2024-01-07 RX ORDER — INSULIN LISPRO 100 [IU]/ML
0-4 INJECTION, SOLUTION INTRAVENOUS; SUBCUTANEOUS
Status: DISCONTINUED | OUTPATIENT
Start: 2024-01-07 | End: 2024-01-07

## 2024-01-07 RX ORDER — DEXTROSE MONOHYDRATE 100 MG/ML
INJECTION, SOLUTION INTRAVENOUS CONTINUOUS PRN
Status: DISCONTINUED | OUTPATIENT
Start: 2024-01-07 | End: 2024-01-09 | Stop reason: HOSPADM

## 2024-01-07 RX ORDER — INSULIN LISPRO 100 [IU]/ML
0-4 INJECTION, SOLUTION INTRAVENOUS; SUBCUTANEOUS NIGHTLY
Status: DISCONTINUED | OUTPATIENT
Start: 2024-01-07 | End: 2024-01-09 | Stop reason: HOSPADM

## 2024-01-07 RX ORDER — POTASSIUM CHLORIDE 7.45 MG/ML
10 INJECTION INTRAVENOUS PRN
Status: DISCONTINUED | OUTPATIENT
Start: 2024-01-07 | End: 2024-01-09 | Stop reason: HOSPADM

## 2024-01-07 RX ORDER — SODIUM CHLORIDE 9 MG/ML
INJECTION, SOLUTION INTRAVENOUS PRN
Status: DISCONTINUED | OUTPATIENT
Start: 2024-01-07 | End: 2024-01-09 | Stop reason: HOSPADM

## 2024-01-07 RX ORDER — INSULIN GLARGINE 100 [IU]/ML
10 INJECTION, SOLUTION SUBCUTANEOUS NIGHTLY
Status: DISCONTINUED | OUTPATIENT
Start: 2024-01-07 | End: 2024-01-09 | Stop reason: HOSPADM

## 2024-01-07 RX ORDER — ENOXAPARIN SODIUM 100 MG/ML
40 INJECTION SUBCUTANEOUS DAILY
Status: DISCONTINUED | OUTPATIENT
Start: 2024-01-07 | End: 2024-01-09 | Stop reason: HOSPADM

## 2024-01-07 RX ORDER — IPRATROPIUM BROMIDE AND ALBUTEROL SULFATE 2.5; .5 MG/3ML; MG/3ML
1 SOLUTION RESPIRATORY (INHALATION)
Status: DISCONTINUED | OUTPATIENT
Start: 2024-01-07 | End: 2024-01-09 | Stop reason: HOSPADM

## 2024-01-07 RX ORDER — LOSARTAN POTASSIUM 25 MG/1
25 TABLET ORAL DAILY
Status: DISCONTINUED | OUTPATIENT
Start: 2024-01-07 | End: 2024-01-09 | Stop reason: HOSPADM

## 2024-01-07 RX ORDER — MAGNESIUM SULFATE IN WATER 40 MG/ML
2000 INJECTION, SOLUTION INTRAVENOUS PRN
Status: DISCONTINUED | OUTPATIENT
Start: 2024-01-07 | End: 2024-01-09 | Stop reason: HOSPADM

## 2024-01-07 RX ORDER — GLUCAGON 1 MG/ML
1 KIT INJECTION PRN
Status: DISCONTINUED | OUTPATIENT
Start: 2024-01-07 | End: 2024-01-09 | Stop reason: HOSPADM

## 2024-01-07 RX ORDER — LEVOFLOXACIN 5 MG/ML
500 INJECTION, SOLUTION INTRAVENOUS EVERY 24 HOURS
Status: DISCONTINUED | OUTPATIENT
Start: 2024-01-07 | End: 2024-01-08

## 2024-01-07 RX ORDER — INSULIN LISPRO 100 [IU]/ML
0-8 INJECTION, SOLUTION INTRAVENOUS; SUBCUTANEOUS
Status: DISCONTINUED | OUTPATIENT
Start: 2024-01-07 | End: 2024-01-09 | Stop reason: HOSPADM

## 2024-01-07 RX ORDER — ONDANSETRON 4 MG/1
4 TABLET, ORALLY DISINTEGRATING ORAL EVERY 8 HOURS PRN
Status: DISCONTINUED | OUTPATIENT
Start: 2024-01-07 | End: 2024-01-09 | Stop reason: HOSPADM

## 2024-01-07 RX ORDER — DIPHENHYDRAMINE HCL 25 MG
25 TABLET ORAL EVERY 8 HOURS PRN
Status: DISCONTINUED | OUTPATIENT
Start: 2024-01-07 | End: 2024-01-09 | Stop reason: HOSPADM

## 2024-01-07 RX ORDER — POLYETHYLENE GLYCOL 3350 17 G/17G
17 POWDER, FOR SOLUTION ORAL DAILY PRN
Status: DISCONTINUED | OUTPATIENT
Start: 2024-01-07 | End: 2024-01-09 | Stop reason: HOSPADM

## 2024-01-07 RX ORDER — INSULIN LISPRO 100 [IU]/ML
0-4 INJECTION, SOLUTION INTRAVENOUS; SUBCUTANEOUS NIGHTLY
Status: DISCONTINUED | OUTPATIENT
Start: 2024-01-07 | End: 2024-01-07

## 2024-01-07 RX ORDER — ACETAMINOPHEN 650 MG/1
650 SUPPOSITORY RECTAL EVERY 6 HOURS PRN
Status: DISCONTINUED | OUTPATIENT
Start: 2024-01-07 | End: 2024-01-09 | Stop reason: HOSPADM

## 2024-01-07 RX ORDER — ONDANSETRON 2 MG/ML
4 INJECTION INTRAMUSCULAR; INTRAVENOUS EVERY 6 HOURS PRN
Status: DISCONTINUED | OUTPATIENT
Start: 2024-01-07 | End: 2024-01-09 | Stop reason: HOSPADM

## 2024-01-07 RX ORDER — SODIUM CHLORIDE 0.9 % (FLUSH) 0.9 %
5-40 SYRINGE (ML) INJECTION EVERY 12 HOURS SCHEDULED
Status: DISCONTINUED | OUTPATIENT
Start: 2024-01-07 | End: 2024-01-09 | Stop reason: HOSPADM

## 2024-01-07 RX ORDER — ACETAMINOPHEN 325 MG/1
650 TABLET ORAL EVERY 6 HOURS PRN
Status: DISCONTINUED | OUTPATIENT
Start: 2024-01-07 | End: 2024-01-09 | Stop reason: HOSPADM

## 2024-01-07 RX ADMIN — INSULIN LISPRO 4 UNITS: 100 INJECTION, SOLUTION INTRAVENOUS; SUBCUTANEOUS at 06:55

## 2024-01-07 RX ADMIN — INSULIN GLARGINE 10 UNITS: 100 INJECTION, SOLUTION SUBCUTANEOUS at 20:21

## 2024-01-07 RX ADMIN — INSULIN LISPRO 4 UNITS: 100 INJECTION, SOLUTION INTRAVENOUS; SUBCUTANEOUS at 11:59

## 2024-01-07 RX ADMIN — LOSARTAN POTASSIUM 25 MG: 25 TABLET, FILM COATED ORAL at 09:08

## 2024-01-07 RX ADMIN — METHYLPREDNISOLONE SODIUM SUCCINATE 60 MG: 40 INJECTION, POWDER, FOR SOLUTION INTRAMUSCULAR; INTRAVENOUS at 00:39

## 2024-01-07 RX ADMIN — IPRATROPIUM BROMIDE AND ALBUTEROL SULFATE 1 DOSE: 2.5; .5 SOLUTION RESPIRATORY (INHALATION) at 12:35

## 2024-01-07 RX ADMIN — IPRATROPIUM BROMIDE AND ALBUTEROL SULFATE 1 DOSE: 2.5; .5 SOLUTION RESPIRATORY (INHALATION) at 08:04

## 2024-01-07 RX ADMIN — ENOXAPARIN SODIUM 40 MG: 100 INJECTION SUBCUTANEOUS at 09:08

## 2024-01-07 RX ADMIN — SODIUM CHLORIDE, PRESERVATIVE FREE 10 ML: 5 INJECTION INTRAVENOUS at 09:09

## 2024-01-07 RX ADMIN — INSULIN LISPRO 4 UNITS: 100 INJECTION, SOLUTION INTRAVENOUS; SUBCUTANEOUS at 17:15

## 2024-01-07 RX ADMIN — LEVOFLOXACIN 500 MG: 5 INJECTION, SOLUTION INTRAVENOUS at 06:56

## 2024-01-07 RX ADMIN — SODIUM CHLORIDE, PRESERVATIVE FREE 10 ML: 5 INJECTION INTRAVENOUS at 20:21

## 2024-01-07 RX ADMIN — INSULIN LISPRO 8 UNITS: 100 INJECTION, SOLUTION INTRAVENOUS; SUBCUTANEOUS at 09:09

## 2024-01-07 RX ADMIN — PREDNISONE 40 MG: 20 TABLET ORAL at 09:08

## 2024-01-07 RX ADMIN — DIPHENHYDRAMINE HYDROCHLORIDE 25 MG: 25 TABLET ORAL at 23:50

## 2024-01-07 RX ADMIN — IPRATROPIUM BROMIDE AND ALBUTEROL SULFATE 1 DOSE: 2.5; .5 SOLUTION RESPIRATORY (INHALATION) at 16:09

## 2024-01-07 NOTE — H&P
History and Physical      Name:  Jeromy Bojorquez /Age/Sex: 1960  (63 y.o. male)   MRN & CSN:  [id number] & [id number] Encounter Date/Time: 2024 1:29 AM EST   Location:  ED27/ED-27 PCP: No primary care provider on file.       Hospital Day: 2    Assessment and Plan:     Patient is a 63 y.o. male who presented with SOB     Acute COPD exacerbation   Acute on Chronic Hypoxemic hypercapnic Respiratory Failure   - Endorsed worsening SOB with increased productive cough for 4 days. On 2L NC at baseline.   - On admit, hemodynamically stable, not in respiratory distress, requiring 7L NC then BIPAP  - ABG 7.//.   - CXR without opacities/consolidation. RVP negative  - Received 125mg solumedrol, Duonebs in ED    - Continue steroids   - Levaquin 500mg daily   - Continue supportive care.   - DuoNebs        Non-MI troponin elevation  - Denied any typical CP.  - Initial Tn mildly elevated and plateaued. ECG without acute ischemic changes.  - Likely secondary to hypoxia.     -Tele     T2DM w/ Hyperglycemia   - LCIS  - Hold PO meds   - -180      Checklist:  Advanced directive: full  Diet: cardiac  DVT ppx: Lovenox  Sugar: BG goal of 140-180 while inpatient    Disposition: admit to inpatient.  Estimated discharge: 2 day(s).  Current living situation: home.  Expected disposition: home.    Spoke with ED provider who recommended admission for the patient and I agree with that plan.  Personally reviewed lab studies and imaging.  EKG interpreted personally and results as stated above.  Imaging that was interpreted personally and results as stated above.    History of Present Illness:     Chief Complaint:    Chief Complaint   Patient presents with    Shortness of Breath       Patient is a 63 y.o. male with a PMHx of HTN, T2DM COPD on 2L NC who presented to the ED with SOB.  Patient presents due to acute on chronic worsening of his shortness of breath associated with productive cough and increased sputum

## 2024-01-07 NOTE — ED PROVIDER NOTES
punctuation, and spelling, as well as words and phrases that may be inappropriate.  Efforts were made to edit the dictations.       Sergio Castellanos MD  01/07/24 0106       Sergio Castellanos MD  01/07/24 0144

## 2024-01-07 NOTE — ED NOTES
Pt removing pulse ox off finger, explained importance of keeping it on so that we can monitor pt. Pt states understanding, but then removes it again soon aft this RN leaves room.   
Lymphocytes % 19.8 (*)     Monocytes % 11.0 (*)     Eosinophils % 4.9 (*)     Basophils % 1.8 (*)     All other components within normal limits   TROPONIN - Abnormal; Notable for the following components:    Troponin, High Sensitivity 27 (*)     All other components within normal limits   TROPONIN - Abnormal; Notable for the following components:    Troponin, High Sensitivity 23 (*)     All other components within normal limits   BLOOD GAS, ARTERIAL - Abnormal; Notable for the following components:    pH, Bld 7.28 (*)     pCO2, Arterial 94.0 (*)     pO2, Arterial 66 (*)     Base Exc, Mixed 13.1 (*)     HCO3, Arterial 44.2 (*)     CO2 Content 47.1 (*)     O2 Sat 88.0 (*)     All other components within normal limits       Background  History:   Past Medical History:   Diagnosis Date    Acute exacerbation of chronic obstructive pulmonary disease (COPD) ([facility]) 3/23/2023    Acute on chronic respiratory failure with hypoxemia ([facility]) 1/17/2022    Arthritis     Cataract     Chronic hypoxemic respiratory failure ([facility]) 6/30/2022    COPD (chronic obstructive pulmonary disease) ([facility])     COPD, severe ([facility]) 1/17/2022    Diabetes mellitus ([facility])     Enlarged prostate     Former smoker 1/17/2022    Hyperlipidemia     Hypertension     Restrictive lung disease 3/23/2023       Assessment    Vitals:    Level of Consciousness: Alert (0)   Vitals:    01/07/24 0027 01/07/24 0046 01/07/24 0056 01/07/24 0114   BP: 139/66  133/66    Pulse: 99  100    Resp:  16  29   SpO2:   93% 93%     PO Status: Nothing by Mouth  O2 Flow Rate: O2 Device: Nasal cannula O2 Flow Rate (L/min): 7 L/min  Cardiac Rhythm: SR  Last documented pain medication administered: n/a  NIH Score: NIH     Active LDA's:   Peripheral IV 01/07/24 Right Antecubital (Active)       Pertinent or High Risk Medications/Drips: no   If Yes, please provide details:   Blood Product Administration: no  If Yes, please provide details:     Recommendation    Incomplete orders   Additional

## 2024-01-07 NOTE — PLAN OF CARE
Problem: Discharge Planning  Goal: Discharge to home or other facility with appropriate resources  Outcome: Progressing     Problem: Skin/Tissue Integrity  Goal: Absence of new skin breakdown  Description: 1.  Monitor for areas of redness and/or skin breakdown  2.  Assess vascular access sites hourly  3.  Every 4-6 hours minimum:  Change oxygen saturation probe site  4.  Every 4-6 hours:  If on nasal continuous positive airway pressure, respiratory therapy assess nares and determine need for appliance change or resting period.  Outcome: Progressing     Problem: Safety - Adult  Goal: Free from fall injury  Outcome: Progressing     Problem: Respiratory - Adult  Goal: Achieves optimal ventilation and oxygenation  Outcome: Progressing     Problem: Skin/Tissue Integrity - Adult  Goal: Skin integrity remains intact  Outcome: Progressing  Goal: Oral mucous membranes remain intact  Outcome: Progressing     Problem: Chronic Conditions and Co-morbidities  Goal: Patient's chronic conditions and co-morbidity symptoms are monitored and maintained or improved  Outcome: Progressing

## 2024-01-08 LAB
ALBUMIN SERPL-MCNC: 3.9 GM/DL (ref 3.4–5)
ALP BLD-CCNC: 66 IU/L (ref 40–128)
ALT SERPL-CCNC: 8 U/L (ref 10–40)
ANION GAP SERPL CALCULATED.3IONS-SCNC: 9 MMOL/L (ref 7–16)
AST SERPL-CCNC: 12 IU/L (ref 15–37)
BASOPHILS ABSOLUTE: 0 K/CU MM
BASOPHILS RELATIVE PERCENT: 0.2 % (ref 0–1)
BILIRUB SERPL-MCNC: 0.4 MG/DL (ref 0–1)
BUN SERPL-MCNC: 13 MG/DL (ref 6–23)
CALCIUM SERPL-MCNC: 8.8 MG/DL (ref 8.3–10.6)
CHLORIDE BLD-SCNC: 96 MMOL/L (ref 99–110)
CO2: 33 MMOL/L (ref 21–32)
CREAT SERPL-MCNC: 0.8 MG/DL (ref 0.9–1.3)
DIFFERENTIAL TYPE: ABNORMAL
EOSINOPHILS ABSOLUTE: 0 K/CU MM
EOSINOPHILS RELATIVE PERCENT: 0.1 % (ref 0–3)
GFR SERPL CREATININE-BSD FRML MDRD: >60 ML/MIN/1.73M2
GLUCOSE BLD-MCNC: 206 MG/DL (ref 70–99)
GLUCOSE BLD-MCNC: 209 MG/DL (ref 70–99)
GLUCOSE BLD-MCNC: 255 MG/DL (ref 70–99)
GLUCOSE BLD-MCNC: 272 MG/DL (ref 70–99)
GLUCOSE SERPL-MCNC: 211 MG/DL (ref 70–99)
HCT VFR BLD CALC: 52.1 % (ref 42–52)
HEMOGLOBIN: 16.2 GM/DL (ref 13.5–18)
IMMATURE NEUTROPHIL %: 0.4 % (ref 0–0.43)
LYMPHOCYTES ABSOLUTE: 1.5 K/CU MM
LYMPHOCYTES RELATIVE PERCENT: 8.9 % (ref 24–44)
MCH RBC QN AUTO: 27 PG (ref 27–31)
MCHC RBC AUTO-ENTMCNC: 31.1 % (ref 32–36)
MCV RBC AUTO: 87 FL (ref 78–100)
MONOCYTES ABSOLUTE: 1.6 K/CU MM
MONOCYTES RELATIVE PERCENT: 9.4 % (ref 0–4)
NUCLEATED RBC %: 0 %
PDW BLD-RTO: 15.9 % (ref 11.7–14.9)
PLATELET # BLD: 226 K/CU MM (ref 140–440)
PMV BLD AUTO: 9.1 FL (ref 7.5–11.1)
POTASSIUM SERPL-SCNC: 4.4 MMOL/L (ref 3.5–5.1)
PRO-BNP: 206.6 PG/ML
PROCALCITONIN SERPL-MCNC: 0.02 NG/ML
RBC # BLD: 5.99 M/CU MM (ref 4.6–6.2)
SEGMENTED NEUTROPHILS ABSOLUTE COUNT: 13.8 K/CU MM
SEGMENTED NEUTROPHILS RELATIVE PERCENT: 81 % (ref 36–66)
SODIUM BLD-SCNC: 138 MMOL/L (ref 135–145)
TOTAL IMMATURE NEUTOROPHIL: 0.06 K/CU MM
TOTAL NUCLEATED RBC: 0 K/CU MM
TOTAL PROTEIN: 6.6 GM/DL (ref 6.4–8.2)
WBC # BLD: 17 K/CU MM (ref 4–10.5)

## 2024-01-08 PROCEDURE — 6370000000 HC RX 637 (ALT 250 FOR IP): Performed by: STUDENT IN AN ORGANIZED HEALTH CARE EDUCATION/TRAINING PROGRAM

## 2024-01-08 PROCEDURE — 94150 VITAL CAPACITY TEST: CPT

## 2024-01-08 PROCEDURE — 2580000003 HC RX 258: Performed by: STUDENT IN AN ORGANIZED HEALTH CARE EDUCATION/TRAINING PROGRAM

## 2024-01-08 PROCEDURE — 80053 COMPREHEN METABOLIC PANEL: CPT

## 2024-01-08 PROCEDURE — 94640 AIRWAY INHALATION TREATMENT: CPT

## 2024-01-08 PROCEDURE — 6370000000 HC RX 637 (ALT 250 FOR IP): Performed by: NURSE PRACTITIONER

## 2024-01-08 PROCEDURE — 36600 WITHDRAWAL OF ARTERIAL BLOOD: CPT

## 2024-01-08 PROCEDURE — 2700000000 HC OXYGEN THERAPY PER DAY

## 2024-01-08 PROCEDURE — 6360000002 HC RX W HCPCS: Performed by: STUDENT IN AN ORGANIZED HEALTH CARE EDUCATION/TRAINING PROGRAM

## 2024-01-08 PROCEDURE — 1200000000 HC SEMI PRIVATE

## 2024-01-08 PROCEDURE — 36415 COLL VENOUS BLD VENIPUNCTURE: CPT

## 2024-01-08 PROCEDURE — 84145 PROCALCITONIN (PCT): CPT

## 2024-01-08 PROCEDURE — 82962 GLUCOSE BLOOD TEST: CPT

## 2024-01-08 PROCEDURE — 83880 ASSAY OF NATRIURETIC PEPTIDE: CPT

## 2024-01-08 PROCEDURE — 85025 COMPLETE CBC W/AUTO DIFF WBC: CPT

## 2024-01-08 PROCEDURE — 94664 DEMO&/EVAL PT USE INHALER: CPT

## 2024-01-08 RX ORDER — DOXYCYCLINE HYCLATE 100 MG
100 TABLET ORAL EVERY 12 HOURS SCHEDULED
Status: DISCONTINUED | OUTPATIENT
Start: 2024-01-08 | End: 2024-01-09 | Stop reason: HOSPADM

## 2024-01-08 RX ADMIN — LEVOFLOXACIN 500 MG: 5 INJECTION, SOLUTION INTRAVENOUS at 06:19

## 2024-01-08 RX ADMIN — IPRATROPIUM BROMIDE AND ALBUTEROL SULFATE 1 DOSE: 2.5; .5 SOLUTION RESPIRATORY (INHALATION) at 10:59

## 2024-01-08 RX ADMIN — INSULIN LISPRO 4 UNITS: 100 INJECTION, SOLUTION INTRAVENOUS; SUBCUTANEOUS at 16:47

## 2024-01-08 RX ADMIN — DOXYCYCLINE HYCLATE 100 MG: 100 TABLET, COATED ORAL at 10:54

## 2024-01-08 RX ADMIN — INSULIN LISPRO 2 UNITS: 100 INJECTION, SOLUTION INTRAVENOUS; SUBCUTANEOUS at 09:07

## 2024-01-08 RX ADMIN — PREDNISONE 40 MG: 20 TABLET ORAL at 09:07

## 2024-01-08 RX ADMIN — SODIUM CHLORIDE, PRESERVATIVE FREE 10 ML: 5 INJECTION INTRAVENOUS at 09:20

## 2024-01-08 RX ADMIN — DOXYCYCLINE HYCLATE 100 MG: 100 TABLET, COATED ORAL at 20:49

## 2024-01-08 RX ADMIN — SODIUM CHLORIDE, PRESERVATIVE FREE 10 ML: 5 INJECTION INTRAVENOUS at 20:50

## 2024-01-08 RX ADMIN — INSULIN LISPRO 2 UNITS: 100 INJECTION, SOLUTION INTRAVENOUS; SUBCUTANEOUS at 11:34

## 2024-01-08 RX ADMIN — INSULIN GLARGINE 10 UNITS: 100 INJECTION, SOLUTION SUBCUTANEOUS at 20:49

## 2024-01-08 RX ADMIN — LOSARTAN POTASSIUM 25 MG: 25 TABLET, FILM COATED ORAL at 09:07

## 2024-01-08 RX ADMIN — DIPHENHYDRAMINE HYDROCHLORIDE 25 MG: 25 TABLET ORAL at 09:10

## 2024-01-08 RX ADMIN — IPRATROPIUM BROMIDE AND ALBUTEROL SULFATE 1 DOSE: 2.5; .5 SOLUTION RESPIRATORY (INHALATION) at 15:29

## 2024-01-08 RX ADMIN — IPRATROPIUM BROMIDE AND ALBUTEROL SULFATE 1 DOSE: 2.5; .5 SOLUTION RESPIRATORY (INHALATION) at 20:45

## 2024-01-08 RX ADMIN — ENOXAPARIN SODIUM 40 MG: 100 INJECTION SUBCUTANEOUS at 09:07

## 2024-01-08 RX ADMIN — IPRATROPIUM BROMIDE AND ALBUTEROL SULFATE 1 DOSE: 2.5; .5 SOLUTION RESPIRATORY (INHALATION) at 07:20

## 2024-01-08 ASSESSMENT — COPD QUESTIONNAIRES
QUESTION1_COUGHFREQUENCY: 3
QUESTION7_SLEEPQUALITY: 1
QUESTION8_ENERGYLEVEL: 3
GOLD_GROUP: GROUP B
QUESTION2_CHESTPHLEGM: 0
QUESTION6_LEAVINGHOUSE: 0
QUESTION4_WALKINCLINE: 4
CAT_TOTALSCORE: 15
TOTAL_EXACERBATIONS_PASTYEAR: 1
QUESTION5_HOMEACTIVITIES: 3
QUESTION3_CHESTTIGHTNESS: 1

## 2024-01-08 ASSESSMENT — PULMONARY FUNCTION TESTS
PIF_VALUE: 80
FEV1 (%PREDICTED): 24.5
POST BRONCHODILATOR FEV1/FVC: 100

## 2024-01-08 NOTE — DISCHARGE INSTRUCTIONS
Health Centers     [facility] at [facility]  [phone]  [address]  (All insurances or no insurance with sliding scale payment)    [facility]   [phone]  [address] ([facility])  (All insurances or no insurance with sliding scale payment)     [facility]  [phone]   [address] [facility]   (All insurances or no insurance with sliding scale payment)      These practices have appointments and walk in hours for hospital follow up. Also schedule a new patient appointment with the Primary Care Provider of your choice. It normally takes a few weeks to get an appointment with a new provider, so call TODAY. The following providers are known to be accepting new patients.   If issues arise, call your health insurance for in network options.    [place] PCP    [facility]  Dr. Lilly   [phone]  [address] [facility]  (All insurances accepted)    [facility]  Sandra Rich CNP  [phone]  [address] [facility] [facility]  (No A[employer]na Medicare, [employer], C AARP Medicare, [place], [employer] or [facility] Plans)    [facility]  Dr. Sapp and Devi Barnett NP  [phone]  [address]   (All insurances accepted)    [facility]  Dr. Judd and Nelda Camargo PA  [phone]  [address] [facility]  (No [place] and [facility])    [facility]  Rell Jenkins CNP   [phone]  [address] [facility] [address] [facility]  (No [employer], [place] or any [employer] products)    Magaly Reis CNP - [facility]  [phone] [address] website - [email or web address]  *PRIVATE PAY ONLY - Membership Program*    [facility]  El Meier PA and Marcelo Stephens PA  [phone]  [address] [facility] [address] [facility]  (No

## 2024-01-08 NOTE — CARE COORDINATION
01/08/24 1400   Service Assessment   Patient Orientation Alert and Oriented   Cognition Alert   History Provided By Patient;Medical Record   Primary Caregiver Family  (sister)   Support Systems Family Members   Patient's Healthcare Decision Maker is: Legal Next of Kin   PCP Verified by CM No  (Did not make follow up with Debbie Chand NP)   Prior Functional Level Independent in ADLs/IADLs   Current Functional Level Assistance with the following:;Bathing;Toileting;Mobility  (Hospital policy)   Can patient return to prior living arrangement Yes   Ability to make needs known: Good   Family able to assist with home care needs: Yes   Financial Resources Medicare;Medicaid   Community Resources None   Social/Functional History   Lives With Family  (sister)   Type of Home House   Home Layout One level;Able to Live on Main level with bedroom/bathroom   Home Access Stairs to enter with rails   Entrance Stairs - Number of Steps 2   Bathroom Shower/Tub Tub/Shower unit   Bathroom Toilet Standard   Home Equipment Oxygen   Receives Help From Family   ADL Assistance Independent   Homemaking Assistance Independent   Ambulation Assistance Independent   Active  Yes   Mode of Transportation Car  (Stated\"car is in bad shape\")   Occupation On disability     Met with patient for discharge planning. He is alert and able to participate. Patient lives with his sister; stated independent prior to admission. He does not have a PCP after Dr Sapp left. He did not make an appointment with Debbie Chand NP as encouraged. He is willing to call the office for follow up. Patient stated that insurance \"pays for prescriptions\" No further CM needs. Will remain available should remain available should needs arise. Rose Burnham, RN

## 2024-01-09 VITALS
DIASTOLIC BLOOD PRESSURE: 76 MMHG | WEIGHT: 209.66 LBS | SYSTOLIC BLOOD PRESSURE: 137 MMHG | BODY MASS INDEX: 31.05 KG/M2 | TEMPERATURE: 98.2 F | OXYGEN SATURATION: 93 % | HEIGHT: 69 IN | RESPIRATION RATE: 16 BRPM | HEART RATE: 90 BPM

## 2024-01-09 LAB
BASE EXCESS MIXED: 12.8 (ref 0–3)
CARBON MONOXIDE, BLOOD: 2 % (ref 0–5)
CO2 CONTENT: 41.6 MMOL/L (ref 21–32)
COMMENT: ABNORMAL
GLUCOSE BLD-MCNC: 185 MG/DL (ref 70–99)
HCO3 ARTERIAL: 39.8 MMOL/L (ref 21–28)
METHEMOGLOBIN ARTERIAL: 1.3 %
O2 SATURATION: 92.8 % (ref 94–98)
PCO2 ARTERIAL: 60 MMHG (ref 35–48)
PH BLOOD: 7.43 (ref 7.35–7.45)
PO2 ARTERIAL: 80 MMHG (ref 83–108)

## 2024-01-09 PROCEDURE — 80053 COMPREHEN METABOLIC PANEL: CPT

## 2024-01-09 PROCEDURE — 82962 GLUCOSE BLOOD TEST: CPT

## 2024-01-09 PROCEDURE — 2580000003 HC RX 258: Performed by: STUDENT IN AN ORGANIZED HEALTH CARE EDUCATION/TRAINING PROGRAM

## 2024-01-09 PROCEDURE — 94761 N-INVAS EAR/PLS OXIMETRY MLT: CPT

## 2024-01-09 PROCEDURE — 82803 BLOOD GASES ANY COMBINATION: CPT

## 2024-01-09 PROCEDURE — 6360000002 HC RX W HCPCS: Performed by: STUDENT IN AN ORGANIZED HEALTH CARE EDUCATION/TRAINING PROGRAM

## 2024-01-09 PROCEDURE — 94640 AIRWAY INHALATION TREATMENT: CPT

## 2024-01-09 PROCEDURE — 6370000000 HC RX 637 (ALT 250 FOR IP): Performed by: STUDENT IN AN ORGANIZED HEALTH CARE EDUCATION/TRAINING PROGRAM

## 2024-01-09 PROCEDURE — 2700000000 HC OXYGEN THERAPY PER DAY

## 2024-01-09 PROCEDURE — 94660 CPAP INITIATION&MGMT: CPT

## 2024-01-09 PROCEDURE — 85025 COMPLETE CBC W/AUTO DIFF WBC: CPT

## 2024-01-09 RX ORDER — DOXYCYCLINE HYCLATE 100 MG
100 TABLET ORAL EVERY 12 HOURS SCHEDULED
Qty: 7 TABLET | Refills: 0 | Status: SHIPPED | OUTPATIENT
Start: 2024-01-09 | End: 2024-01-09

## 2024-01-09 RX ORDER — PREDNISONE 20 MG/1
40 TABLET ORAL DAILY
Qty: 4 TABLET | Refills: 0 | Status: SHIPPED | OUTPATIENT
Start: 2024-01-10 | End: 2024-01-12

## 2024-01-09 RX ORDER — DOXYCYCLINE HYCLATE 100 MG
100 TABLET ORAL EVERY 12 HOURS SCHEDULED
Qty: 7 TABLET | Refills: 0 | Status: SHIPPED | OUTPATIENT
Start: 2024-01-09 | End: 2024-01-13

## 2024-01-09 RX ADMIN — SODIUM CHLORIDE, PRESERVATIVE FREE 10 ML: 5 INJECTION INTRAVENOUS at 08:36

## 2024-01-09 RX ADMIN — IPRATROPIUM BROMIDE AND ALBUTEROL SULFATE 1 DOSE: 2.5; .5 SOLUTION RESPIRATORY (INHALATION) at 07:39

## 2024-01-09 RX ADMIN — PREDNISONE 40 MG: 20 TABLET ORAL at 08:35

## 2024-01-09 RX ADMIN — ENOXAPARIN SODIUM 40 MG: 100 INJECTION SUBCUTANEOUS at 08:35

## 2024-01-09 RX ADMIN — DOXYCYCLINE HYCLATE 100 MG: 100 TABLET, COATED ORAL at 08:35

## 2024-01-09 RX ADMIN — LOSARTAN POTASSIUM 25 MG: 25 TABLET, FILM COATED ORAL at 08:35

## 2024-01-09 RX ADMIN — INSULIN LISPRO 4 UNITS: 100 INJECTION, SOLUTION INTRAVENOUS; SUBCUTANEOUS at 08:38

## 2024-01-09 NOTE — PLAN OF CARE
Problem: Discharge Planning  Goal: Discharge to home or other facility with appropriate resources  Outcome: Completed     Problem: Skin/Tissue Integrity  Goal: Absence of new skin breakdown  Description: 1.  Monitor for areas of redness and/or skin breakdown  2.  Assess vascular access sites hourly  3.  Every 4-6 hours minimum:  Change oxygen saturation probe site  4.  Every 4-6 hours:  If on nasal continuous positive airway pressure, respiratory therapy assess nares and determine need for appliance change or resting period.  Outcome: Completed     Problem: Safety - Adult  Goal: Free from fall injury  Outcome: Completed     Problem: Respiratory - Adult  Goal: Achieves optimal ventilation and oxygenation  Outcome: Completed     Problem: Skin/Tissue Integrity - Adult  Goal: Skin integrity remains intact  Outcome: Completed  Goal: Oral mucous membranes remain intact  Outcome: Completed     Problem: Chronic Conditions and Co-morbidities  Goal: Patient's chronic conditions and co-morbidity symptoms are monitored and maintained or improved  Outcome: Completed

## 2024-01-09 NOTE — DISCHARGE SUMMARY
V2.0  Discharge Summary    Name:  Jeromy Bojorquez /Age/Sex: 1960 (63 y.o. male)   Admit Date: 2024  Discharge Date: 24    MRN & CSN:  [id number] & [id number] Encounter Date and Time 24 9:31 AM EST    Attending:  Yadi Ortiz MD Discharging Provider: Yadi Ortiz MD       Hospital Course:     Brief HPI: Jeromy Bojorquez is a 63 y.o. male who presented with  pmh of  COPD, chronic respiratory failure on 2 L of nasal cannula, hypertension, type 2 diabetes mellitus and obesity who presents with COPD exacerbation (HCC)     Brief Problem Based Course:   # Acute on chronic hypoxic/hypercapnic respiratory failure secondary to COPD exacerbation: Presented with worsening shortness of breath with increased productive cough, baseline oxygen 2 L, ABG showed PCO2 94/pO2 66, chest x-ray without opacity/consolidation, respiratory panel negative, started on BiPAP initially, Solu-Medrol 125 mg transition to p.o. prednisone, DuoNebs, started on Levaquin, incentive spirometry, Pro-Jovon negative switch Levaquin to p.o. doxycycline for COPD exacerbation once improved symptomatically discharged the patient in a stable condition.     # Hypertension: Continue losartan  # Type 2 diabetes mellitus: A1c 9.0%, started on Lantus 10 units at night, sliding scale insulin, hypoglycemic protocol and diabetic diet.  Continue home medication on discharge  # Obesity: BMI 30.96     Comment: Please note this report has been produced using speech recognition software and may contain errors related to that system including errors in grammar, punctuation, and spelling, as well as words and phrases that may be inappropriate. If there are any questions or concerns please feel free to contact the dictating provider for clarification.       The patient expressed appropriate understanding of, and agreement with the discharge recommendations, medications, and plan.     Consults this admission:  None    Discharge

## 2024-01-09 NOTE — PROGRESS NOTES
V2.0    [facility] Progress Note      Name:  Jeromy Bojorquez /Age/Sex: 1960  (63 y.o. male)   MRN & CSN:  [id number] & [id number] Encounter Date/Time: 2024 7:49 AM EST   Location:  -A PCP: No primary care provider on file.     Attending:Yadi Ortiz MD       Hospital Day: 3    Assessment and Recommendations   Jeromy Bojorquez is a 63 y.o. male with pmh of  COPD, chronic respiratory failure on 2 L of nasal cannula, hypertension, type 2 diabetes mellitus and obesity who presents with COPD exacerbation (HCC)      # Acute on chronic hypoxic/hypercapnic respiratory failure secondary to COPD exacerbation: Presented with worsening shortness of breath with increased productive cough, baseline oxygen 2 L, ABG showed PCO2 94/pO2 66, chest x-ray without opacity/consolidation, respiratory panel negative, started on BiPAP initially, Solu-Medrol 125 mg transition to p.o. prednisone, DuoNebs, started on Levaquin, incentive spirometry, Pro-Jovon negative switch Levaquin to p.o. doxycycline for COPD exacerbation and continue to monitor.    # Hypertension: Continue losartan  # Type 2 diabetes mellitus: A1c 9.0%, started on Lantus 10 units at night, sliding scale insulin, hypoglycemic protocol and diabetic diet.  # Obesity: BMI 30.96    Comment: Please note this report has been produced using speech recognition software and may contain errors related to that system including errors in grammar, punctuation, and spelling, as well as words and phrases that may be inappropriate. If there are any questions or concerns please feel free to contact the dictating provider for clarification.     Diet ADULT DIET; Regular   DVT Prophylaxis [x] Lovenox, []  Heparin, [] SCDs, [] Ambulation,  [] Eliquis, [] Xarelto  [] Coumadin   Code Status Full Code   Disposition From: Home  Expected Disposition: Home  Estimated Date of Discharge: TBD  Patient requires continued admission due to acute respiratory failure, COPD 
   01/09/24 0352   NIV Type   NIV Started/Stopped On   Equipment Type autoap   Mode Auto-PAP   Mask Type Full face mask   Mask Size Medium   Assessment   Pulse 72   Respirations 20   SpO2 92 %   Level of Consciousness 0   Comfort Level Good   Using Accessory Muscles No   Mask Compliance Good   Settings/Measurements   PIP Observed   (Autopap 4 to 20 cm/H20)   O2 Flow Rate (L/min) 4 L/min   FiO2  36 %   Patient's Home Machine No       
4 Eyes Skin Assessment     NAME:  Jeromy Bojorquez  YOB: 1960  MEDICAL RECORD NUMBER:  [id number]    The patient is being assessed for  Transfer to New Unit    I agree that at least one RN has performed a thorough Head to Toe Skin Assessment on the patient. ALL assessment sites listed below have been assessed.      Areas assessed by both nurses:    Head, Face, Ears, Shoulders, Back, Chest, Arms, Elbows, Hands, Sacrum. Buttock, Coccyx, Ischium, Legs. Feet and Heels, and Under Medical Devices         Does the Patient have a Wound? No noted wound(s)       Felix Prevention initiated by RN: Yes  Wound Care Orders initiated by RN: No    Pressure Injury (Stage 3,4, Unstageable, DTI, NWPT, and Complex wounds) if present, place Wound referral order by RN under : No    New Ostomies, if present place, Ostomy referral order under : No     Nurse 1 eSignature: Electronically signed by Sammy Mcleod RN on 1/7/24 at 6:18 AM EST    **SHARE this note so that the co-signing nurse can place an eSignature**    Nurse 2 eSignature: Electronically signed by Mell Ramos RN on 1/7/24 at 6:22 AM EST   
AVS reviewed with pt. All questions ansered  
Outpatient Pharmacy Progress Note for Meds-to-Beds    Total number of Prescriptions Filled: 1  The following medications were dispensed to the patient during the discharge process:  Prednisone     Additional Documentation:  Patient picked-up the medication(s) in the OP Pharmacy      Thank you for letting us [place] your [place] [address]    Phone: [phone]    Fax: [phone]        
Patient instructed and educated on Incentive Spirometer. Patient able to do 1000 ml. Vital capacity. Patient's goal is 2500ml. Electronically signed by Brian Anand RCP on 1/8/2024 at 11:05 AM  
Patient seen and examined at bedside.  Patient is admitted by my colleague earlier this morning.  I reviewed history and physical and agree with plan of care.      Briefly a 63-year-old male with past medical history of COPD, chronic respiratory failure on 2 L of nasal cannula, hypertension, type 2 diabetes mellitus presented with worsening shortness of breath associated with productive cough for 4 days.  Chest x-ray opacification/consolidation, respiratory panel negative, ABG showed pCO2 94, pO2 66 requiring BiPAP, started on IV Solu-Medrol switched to p.o. prednisone, continue DuoNebs, levofloxacin, I-S and continue to monitor.  
Patient transferring to room 4116. Report called to JO ANN Alaniz.  
Pt arrived from ED via stretcher. Pt was able to ambulate to bed from stretcher. Pt was able to demonstrate use of bed controls as well as call light system. No complaints or concerns at this time.  
  Assessment Score 15   $RT COPD Assessment Yes   GOLD Staging   Group Group B

## 2024-01-12 ENCOUNTER — OFFICE VISIT (OUTPATIENT)
Dept: PULMONOLOGY | Age: 64
End: 2024-01-12

## 2024-01-12 VITALS
OXYGEN SATURATION: 97 % | HEIGHT: 69 IN | HEART RATE: 98 BPM | BODY MASS INDEX: 30.94 KG/M2 | DIASTOLIC BLOOD PRESSURE: 78 MMHG | WEIGHT: 208.9 LBS | SYSTOLIC BLOOD PRESSURE: 140 MMHG

## 2024-01-12 DIAGNOSIS — J42 CHRONIC BRONCHITIS, UNSPECIFIED CHRONIC BRONCHITIS TYPE (HCC): ICD-10-CM

## 2024-01-12 DIAGNOSIS — J98.4 RESTRICTIVE LUNG DISEASE: ICD-10-CM

## 2024-01-12 DIAGNOSIS — J96.12 CHRONIC RESPIRATORY FAILURE WITH HYPOXIA AND HYPERCAPNIA (HCC): Primary | ICD-10-CM

## 2024-01-12 DIAGNOSIS — J96.11 CHRONIC RESPIRATORY FAILURE WITH HYPOXIA AND HYPERCAPNIA (HCC): Primary | ICD-10-CM

## 2024-01-12 DIAGNOSIS — R06.02 SHORTNESS OF BREATH: ICD-10-CM

## 2024-01-12 DIAGNOSIS — Z87.891 FORMER SMOKER: ICD-10-CM

## 2024-01-12 PROBLEM — J96.21 ACUTE ON CHRONIC RESPIRATORY FAILURE WITH HYPOXEMIA (HCC): Status: RESOLVED | Noted: 2022-01-17 | Resolved: 2024-01-12

## 2024-01-12 RX ORDER — PREDNISONE 5 MG/1
5 TABLET ORAL DAILY
Qty: 30 TABLET | Refills: 11 | Status: SHIPPED | OUTPATIENT
Start: 2024-01-12 | End: 2025-01-06

## 2024-01-12 ASSESSMENT — SLEEP AND FATIGUE QUESTIONNAIRES
ESS TOTAL SCORE: 4
HOW LIKELY ARE YOU TO NOD OFF OR FALL ASLEEP WHILE SITTING AND TALKING TO SOMEONE: 0
HOW LIKELY ARE YOU TO NOD OFF OR FALL ASLEEP WHILE SITTING AND READING: 0
HOW LIKELY ARE YOU TO NOD OFF OR FALL ASLEEP WHILE SITTING INACTIVE IN A PUBLIC PLACE: 0
HOW LIKELY ARE YOU TO NOD OFF OR FALL ASLEEP WHILE LYING DOWN TO REST IN THE AFTERNOON WHEN CIRCUMSTANCES PERMIT: 3
HOW LIKELY ARE YOU TO NOD OFF OR FALL ASLEEP IN A CAR, WHILE STOPPED FOR A FEW MINUTES IN TRAFFIC: 0
NECK CIRCUMFERENCE (INCHES): 19.5
HOW LIKELY ARE YOU TO NOD OFF OR FALL ASLEEP WHILE SITTING QUIETLY AFTER LUNCH WITHOUT ALCOHOL: 0
HOW LIKELY ARE YOU TO NOD OFF OR FALL ASLEEP WHEN YOU ARE A PASSENGER IN A CAR FOR AN HOUR WITHOUT A BREAK: 0
HOW LIKELY ARE YOU TO NOD OFF OR FALL ASLEEP WHILE WATCHING TV: 1

## 2024-01-12 NOTE — PROGRESS NOTES
SUBJECTIVE:  Chief Complaint: Recent acute exacerbation COPD, chronic hypoxemic and hypercapnic respiratory failure, chronic bronchitis, possible pulmonary fibrosis, restrictive lung disease, former smoker, shortness of breath  Mr. Bojorquez states that he was recently hospitalized at [facility] with acute on chronic hypoxemic and hypercapnic respiratory failure.  He was treated with IV antibiotics, corticosteroids and aggressive bronchopulmonary toilet.  He did not require intubation or mechanical ventilation but was placed on BiPAP while hospitalized.  He does not carry history of obstructive sleep apnea but states that he does have a long history of snoring.  He is not complaining of excessive daytime sleepiness.  He stopped smoking about 3 years ago.  He does have significant secondary smoke exposure.  He continues on Anoro Ellipta 1 inhalation daily along with his albuterol rescue inhaler and is currently on a course of oral prednisone which she is finishing.  He is already finished his oral antibiotics following his hospitalization.  He does wear oxygen 24 hours a day.  He states that he did have the influenza and COVID-19 vaccination earlier this fall.      ROS:  Constitution:  HEENT: Negative for ear, throat pain  Cardiovascular: Negative for chest pain, syncope, edema  Pulmonary: See HPI  Musculoskeletal: Negative for DVT, myalgias, arthralgias    OBJECTIVE:  BP (!) 140/78 (Site: Right Upper Arm, Position: Sitting, Cuff Size: Large Adult)   Pulse 98   Ht 1.753 m (5' 9\")   Wt 94.8 kg (208 lb 14.4 oz)   SpO2 97%   BMI 30.85 kg/m²    Mallampati-IIII  [place] sleepiness scale-4  Neck circumference 19.5 inches  BMI 30.85  Physical Exam:  Constitutional:  He appears well developed and well-nourished.  Neck:  Supple, No palpable lymphadenopathy, No JVD  Cardiovascular:  S1, S2 Normal, Regular rhythm, no murmurs or gallops, No pericardial  rubs.  Pulmonary: Breath sounds reveal a few wheezes bilaterally with

## 2024-02-01 ENCOUNTER — APPOINTMENT (OUTPATIENT)
Dept: GENERAL RADIOLOGY | Age: 64
DRG: 189 | End: 2024-02-01
Attending: STUDENT IN AN ORGANIZED HEALTH CARE EDUCATION/TRAINING PROGRAM
Payer: MEDICARE

## 2024-02-01 ENCOUNTER — HOSPITAL ENCOUNTER (INPATIENT)
Age: 64
LOS: 5 days | Discharge: HOME | DRG: 189 | End: 2024-02-06
Attending: STUDENT IN AN ORGANIZED HEALTH CARE EDUCATION/TRAINING PROGRAM | Admitting: STUDENT IN AN ORGANIZED HEALTH CARE EDUCATION/TRAINING PROGRAM
Payer: MEDICARE

## 2024-02-01 ENCOUNTER — APPOINTMENT (OUTPATIENT)
Dept: GENERAL RADIOLOGY | Age: 64
DRG: 189 | End: 2024-02-01
Payer: MEDICARE

## 2024-02-01 DIAGNOSIS — J96.12 CHRONIC RESPIRATORY FAILURE WITH HYPOXIA AND HYPERCAPNIA (HCC): ICD-10-CM

## 2024-02-01 DIAGNOSIS — J96.11 CHRONIC RESPIRATORY FAILURE WITH HYPOXIA AND HYPERCAPNIA (HCC): ICD-10-CM

## 2024-02-01 DIAGNOSIS — J44.1 COPD EXACERBATION (HCC): Primary | ICD-10-CM

## 2024-02-01 DIAGNOSIS — R06.02 SHORTNESS OF BREATH: ICD-10-CM

## 2024-02-01 DIAGNOSIS — J96.02 ACUTE RESPIRATORY FAILURE WITH HYPOXIA AND HYPERCAPNIA (HCC): ICD-10-CM

## 2024-02-01 DIAGNOSIS — E11.65 UNCONTROLLED TYPE 2 DIABETES MELLITUS WITH HYPERGLYCEMIA (HCC): ICD-10-CM

## 2024-02-01 DIAGNOSIS — J96.01 ACUTE RESPIRATORY FAILURE WITH HYPOXIA AND HYPERCAPNIA (HCC): ICD-10-CM

## 2024-02-01 LAB
ALBUMIN SERPL-MCNC: 4 GM/DL (ref 3.4–5)
ALP BLD-CCNC: 69 IU/L (ref 40–128)
ALT SERPL-CCNC: 9 U/L (ref 10–40)
ANION GAP SERPL CALCULATED.3IONS-SCNC: 6 MMOL/L (ref 7–16)
ANISOCYTOSIS: ABNORMAL
AST SERPL-CCNC: 11 IU/L (ref 15–37)
BACTERIA: NEGATIVE /HPF
BASE EXCESS MIXED: 11.4 (ref 0–3)
BASE EXCESS MIXED: 13.3 (ref 0–3)
BASE EXCESS MIXED: 7.6 (ref 0–3)
BILIRUB SERPL-MCNC: 0.6 MG/DL (ref 0–1)
BILIRUBIN URINE: ABNORMAL MG/DL
BLOOD, URINE: NEGATIVE
BUN SERPL-MCNC: 22 MG/DL (ref 6–23)
CALCIUM SERPL-MCNC: 9 MG/DL (ref 8.3–10.6)
CARBON MONOXIDE, BLOOD: 2.8 % (ref 0–5)
CHLORIDE BLD-SCNC: 90 MMOL/L (ref 99–110)
CLARITY: CLEAR
CO2 CONTENT: 35.3 MMOL/L (ref 21–32)
CO2: 40 MMOL/L (ref 21–32)
COLOR: YELLOW
COMMENT: ABNORMAL
CREAT SERPL-MCNC: 0.7 MG/DL (ref 0.9–1.3)
DIFFERENTIAL TYPE: ABNORMAL
EKG ATRIAL RATE: 117 BPM
EKG DIAGNOSIS: NORMAL
EKG P AXIS: 37 DEGREES
EKG P-R INTERVAL: 148 MS
EKG Q-T INTERVAL: 302 MS
EKG QRS DURATION: 84 MS
EKG QTC CALCULATION (BAZETT): 421 MS
EKG R AXIS: 102 DEGREES
EKG T AXIS: 64 DEGREES
EKG VENTRICULAR RATE: 117 BPM
GFR SERPL CREATININE-BSD FRML MDRD: >60 ML/MIN/1.73M2
GLUCOSE BLD-MCNC: 222 MG/DL (ref 70–99)
GLUCOSE BLD-MCNC: 255 MG/DL (ref 70–99)
GLUCOSE SERPL-MCNC: 210 MG/DL (ref 70–99)
GLUCOSE, URINE: 250 MG/DL
HCO3 ARTERIAL: 33.7 MMOL/L (ref 21–28)
HCO3 VENOUS: 41.3 MMOL/L (ref 22–29)
HCO3 VENOUS: 44.2 MMOL/L (ref 22–29)
HCT VFR BLD CALC: 56.5 % (ref 42–52)
HEMOGLOBIN: 17.2 GM/DL (ref 13.5–18)
HYALINE CASTS: 5 /LPF
INFLUENZA A ANTIGEN: NOT DETECTED
INFLUENZA B ANTIGEN: NOT DETECTED
INR BLD: 1.1 INDEX
KETONES, URINE: 15 MG/DL
LACTIC ACID, SEPSIS: 1.6 MMOL/L (ref 0.4–2)
LEUKOCYTE ESTERASE, URINE: NEGATIVE
LYMPHOCYTES ABSOLUTE: 1.2 K/CU MM
LYMPHOCYTES RELATIVE PERCENT: 11 % (ref 24–44)
MCH RBC QN AUTO: 26.7 PG (ref 27–31)
MCHC RBC AUTO-ENTMCNC: 30.4 % (ref 32–36)
MCV RBC AUTO: 87.7 FL (ref 78–100)
METHEMOGLOBIN ARTERIAL: 1 %
MONOCYTES ABSOLUTE: 1.9 K/CU MM
MONOCYTES RELATIVE PERCENT: 18 % (ref 0–4)
MUCUS: ABNORMAL HPF
NITRITE URINE, QUANTITATIVE: NEGATIVE
O2 SAT, VEN: 76.3 % (ref 50–70)
O2 SAT, VEN: 90.8 % (ref 50–70)
O2 SATURATION: 77.4 % (ref 94–98)
PCO2 ARTERIAL: 52 MMHG (ref 35–48)
PCO2, VEN: 82 MMHG (ref 41–51)
PCO2, VEN: 92 MMHG (ref 41–51)
PDW BLD-RTO: 15.6 % (ref 11.7–14.9)
PH BLOOD: 7.42 (ref 7.35–7.45)
PH VENOUS: 7.29 (ref 7.32–7.43)
PH VENOUS: 7.31 (ref 7.32–7.43)
PH, URINE: 6 (ref 5–8)
PLATELET # BLD: 198 K/CU MM (ref 140–440)
PLT MORPHOLOGY: ABNORMAL
PMV BLD AUTO: 9.8 FL (ref 7.5–11.1)
PO2 ARTERIAL: 42 MMHG (ref 83–108)
PO2, VEN: 44 MMHG (ref 28–48)
PO2, VEN: 68 MMHG (ref 28–48)
POTASSIUM SERPL-SCNC: 4.6 MMOL/L (ref 3.5–5.1)
PRO-BNP: 2070 PG/ML
PROTEIN UA: ABNORMAL MG/DL
PROTHROMBIN TIME: 14.5 SECONDS (ref 11.7–14.5)
RBC # BLD: 6.44 M/CU MM (ref 4.6–6.2)
RBC URINE: 2 /HPF (ref 0–3)
SARS-COV-2 RDRP RESP QL NAA+PROBE: NOT DETECTED
SEGMENTED NEUTROPHILS ABSOLUTE COUNT: 7.7 K/CU MM
SEGMENTED NEUTROPHILS RELATIVE PERCENT: 71 % (ref 36–66)
SODIUM BLD-SCNC: 136 MMOL/L (ref 135–145)
SOURCE: NORMAL
SPECIFIC GRAVITY UA: >1.03 (ref 1–1.03)
TOTAL PROTEIN: 7.4 GM/DL (ref 6.4–8.2)
TRICHOMONAS: ABNORMAL /HPF
TROPONIN, HIGH SENSITIVITY: 28 NG/L (ref 0–22)
TROPONIN, HIGH SENSITIVITY: 31 NG/L (ref 0–22)
UROBILINOGEN, URINE: 1 MG/DL (ref 0.2–1)
WBC # BLD: 10.8 K/CU MM (ref 4–10.5)
WBC UA: 1 /HPF (ref 0–2)

## 2024-02-01 PROCEDURE — 80053 COMPREHEN METABOLIC PANEL: CPT

## 2024-02-01 PROCEDURE — 2580000003 HC RX 258: Performed by: STUDENT IN AN ORGANIZED HEALTH CARE EDUCATION/TRAINING PROGRAM

## 2024-02-01 PROCEDURE — 96374 THER/PROPH/DIAG INJ IV PUSH: CPT

## 2024-02-01 PROCEDURE — 94664 DEMO&/EVAL PT USE INHALER: CPT

## 2024-02-01 PROCEDURE — 71045 X-RAY EXAM CHEST 1 VIEW: CPT

## 2024-02-01 PROCEDURE — 2140000000 HC CCU INTERMEDIATE R&B

## 2024-02-01 PROCEDURE — 99285 EMERGENCY DEPT VISIT HI MDM: CPT

## 2024-02-01 PROCEDURE — 93010 ELECTROCARDIOGRAM REPORT: CPT | Performed by: INTERNAL MEDICINE

## 2024-02-01 PROCEDURE — 82805 BLOOD GASES W/O2 SATURATION: CPT

## 2024-02-01 PROCEDURE — 87040 BLOOD CULTURE FOR BACTERIA: CPT

## 2024-02-01 PROCEDURE — 83880 ASSAY OF NATRIURETIC PEPTIDE: CPT

## 2024-02-01 PROCEDURE — 82962 GLUCOSE BLOOD TEST: CPT

## 2024-02-01 PROCEDURE — 82803 BLOOD GASES ANY COMBINATION: CPT

## 2024-02-01 PROCEDURE — 94640 AIRWAY INHALATION TREATMENT: CPT

## 2024-02-01 PROCEDURE — 6360000002 HC RX W HCPCS: Performed by: STUDENT IN AN ORGANIZED HEALTH CARE EDUCATION/TRAINING PROGRAM

## 2024-02-01 PROCEDURE — 5A09357 ASSISTANCE WITH RESPIRATORY VENTILATION, LESS THAN 24 CONSECUTIVE HOURS, CONTINUOUS POSITIVE AIRWAY PRESSURE: ICD-10-PCS | Performed by: STUDENT IN AN ORGANIZED HEALTH CARE EDUCATION/TRAINING PROGRAM

## 2024-02-01 PROCEDURE — 6370000000 HC RX 637 (ALT 250 FOR IP): Performed by: STUDENT IN AN ORGANIZED HEALTH CARE EDUCATION/TRAINING PROGRAM

## 2024-02-01 PROCEDURE — 85027 COMPLETE CBC AUTOMATED: CPT

## 2024-02-01 PROCEDURE — 84484 ASSAY OF TROPONIN QUANT: CPT

## 2024-02-01 PROCEDURE — 2700000000 HC OXYGEN THERAPY PER DAY

## 2024-02-01 PROCEDURE — 87502 INFLUENZA DNA AMP PROBE: CPT

## 2024-02-01 PROCEDURE — 87635 SARS-COV-2 COVID-19 AMP PRB: CPT

## 2024-02-01 PROCEDURE — 81001 URINALYSIS AUTO W/SCOPE: CPT

## 2024-02-01 PROCEDURE — 83605 ASSAY OF LACTIC ACID: CPT

## 2024-02-01 PROCEDURE — 85610 PROTHROMBIN TIME: CPT

## 2024-02-01 PROCEDURE — 85007 BL SMEAR W/DIFF WBC COUNT: CPT

## 2024-02-01 PROCEDURE — 93005 ELECTROCARDIOGRAM TRACING: CPT | Performed by: STUDENT IN AN ORGANIZED HEALTH CARE EDUCATION/TRAINING PROGRAM

## 2024-02-01 PROCEDURE — 94660 CPAP INITIATION&MGMT: CPT

## 2024-02-01 PROCEDURE — 94761 N-INVAS EAR/PLS OXIMETRY MLT: CPT

## 2024-02-01 RX ORDER — DOXYCYCLINE HYCLATE 100 MG
100 TABLET ORAL ONCE
Status: DISCONTINUED | OUTPATIENT
Start: 2024-02-01 | End: 2024-02-03

## 2024-02-01 RX ORDER — ALBUTEROL SULFATE 90 UG/1
2 AEROSOL, METERED RESPIRATORY (INHALATION) EVERY 6 HOURS PRN
Status: DISCONTINUED | OUTPATIENT
Start: 2024-02-01 | End: 2024-02-06 | Stop reason: HOSPADM

## 2024-02-01 RX ORDER — MAGNESIUM SULFATE IN WATER 40 MG/ML
2000 INJECTION, SOLUTION INTRAVENOUS PRN
Status: DISCONTINUED | OUTPATIENT
Start: 2024-02-01 | End: 2024-02-06 | Stop reason: HOSPADM

## 2024-02-01 RX ORDER — 0.9 % SODIUM CHLORIDE 0.9 %
500 INTRAVENOUS SOLUTION INTRAVENOUS ONCE
Status: COMPLETED | OUTPATIENT
Start: 2024-02-01 | End: 2024-02-01

## 2024-02-01 RX ORDER — AZITHROMYCIN 250 MG/1
250 TABLET, FILM COATED ORAL DAILY
Status: COMPLETED | OUTPATIENT
Start: 2024-02-01 | End: 2024-02-05

## 2024-02-01 RX ORDER — INSULIN LISPRO 100 [IU]/ML
0-4 INJECTION, SOLUTION INTRAVENOUS; SUBCUTANEOUS NIGHTLY
Status: DISCONTINUED | OUTPATIENT
Start: 2024-02-01 | End: 2024-02-06 | Stop reason: HOSPADM

## 2024-02-01 RX ORDER — POLYETHYLENE GLYCOL 3350 17 G/17G
17 POWDER, FOR SOLUTION ORAL DAILY PRN
Status: DISCONTINUED | OUTPATIENT
Start: 2024-02-01 | End: 2024-02-06 | Stop reason: HOSPADM

## 2024-02-01 RX ORDER — SODIUM CHLORIDE 0.9 % (FLUSH) 0.9 %
5-40 SYRINGE (ML) INJECTION PRN
Status: DISCONTINUED | OUTPATIENT
Start: 2024-02-01 | End: 2024-02-06 | Stop reason: HOSPADM

## 2024-02-01 RX ORDER — IPRATROPIUM BROMIDE AND ALBUTEROL SULFATE 2.5; .5 MG/3ML; MG/3ML
1 SOLUTION RESPIRATORY (INHALATION) ONCE
Status: COMPLETED | OUTPATIENT
Start: 2024-02-01 | End: 2024-02-01

## 2024-02-01 RX ORDER — POTASSIUM CHLORIDE 7.45 MG/ML
10 INJECTION INTRAVENOUS PRN
Status: DISCONTINUED | OUTPATIENT
Start: 2024-02-01 | End: 2024-02-06 | Stop reason: HOSPADM

## 2024-02-01 RX ORDER — ACETAMINOPHEN 325 MG/1
650 TABLET ORAL EVERY 6 HOURS PRN
Status: DISCONTINUED | OUTPATIENT
Start: 2024-02-01 | End: 2024-02-06 | Stop reason: HOSPADM

## 2024-02-01 RX ORDER — ALBUTEROL SULFATE 2.5 MG/3ML
2.5 SOLUTION RESPIRATORY (INHALATION) ONCE
Status: COMPLETED | OUTPATIENT
Start: 2024-02-01 | End: 2024-02-01

## 2024-02-01 RX ORDER — ONDANSETRON 4 MG/1
4 TABLET, ORALLY DISINTEGRATING ORAL EVERY 8 HOURS PRN
Status: DISCONTINUED | OUTPATIENT
Start: 2024-02-01 | End: 2024-02-06 | Stop reason: HOSPADM

## 2024-02-01 RX ORDER — SODIUM CHLORIDE 9 MG/ML
INJECTION, SOLUTION INTRAVENOUS PRN
Status: DISCONTINUED | OUTPATIENT
Start: 2024-02-01 | End: 2024-02-06 | Stop reason: HOSPADM

## 2024-02-01 RX ORDER — INSULIN GLARGINE 100 [IU]/ML
10 INJECTION, SOLUTION SUBCUTANEOUS NIGHTLY
Status: DISCONTINUED | OUTPATIENT
Start: 2024-02-01 | End: 2024-02-06 | Stop reason: HOSPADM

## 2024-02-01 RX ORDER — 0.9 % SODIUM CHLORIDE 0.9 %
1000 INTRAVENOUS SOLUTION INTRAVENOUS ONCE
Status: COMPLETED | OUTPATIENT
Start: 2024-02-01 | End: 2024-02-01

## 2024-02-01 RX ORDER — ENOXAPARIN SODIUM 100 MG/ML
40 INJECTION SUBCUTANEOUS EVERY EVENING
Status: DISCONTINUED | OUTPATIENT
Start: 2024-02-01 | End: 2024-02-06 | Stop reason: HOSPADM

## 2024-02-01 RX ORDER — ONDANSETRON 2 MG/ML
4 INJECTION INTRAMUSCULAR; INTRAVENOUS EVERY 6 HOURS PRN
Status: DISCONTINUED | OUTPATIENT
Start: 2024-02-01 | End: 2024-02-06 | Stop reason: HOSPADM

## 2024-02-01 RX ORDER — SODIUM CHLORIDE 0.9 % (FLUSH) 0.9 %
5-40 SYRINGE (ML) INJECTION EVERY 12 HOURS SCHEDULED
Status: DISCONTINUED | OUTPATIENT
Start: 2024-02-01 | End: 2024-02-06 | Stop reason: HOSPADM

## 2024-02-01 RX ORDER — LOSARTAN POTASSIUM 25 MG/1
25 TABLET ORAL DAILY
Status: DISCONTINUED | OUTPATIENT
Start: 2024-02-02 | End: 2024-02-06 | Stop reason: HOSPADM

## 2024-02-01 RX ORDER — INSULIN LISPRO 100 [IU]/ML
3 INJECTION, SOLUTION INTRAVENOUS; SUBCUTANEOUS
Status: DISCONTINUED | OUTPATIENT
Start: 2024-02-01 | End: 2024-02-06 | Stop reason: HOSPADM

## 2024-02-01 RX ORDER — ACETAMINOPHEN 650 MG/1
650 SUPPOSITORY RECTAL EVERY 6 HOURS PRN
Status: DISCONTINUED | OUTPATIENT
Start: 2024-02-01 | End: 2024-02-06 | Stop reason: HOSPADM

## 2024-02-01 RX ORDER — IPRATROPIUM BROMIDE AND ALBUTEROL SULFATE 2.5; .5 MG/3ML; MG/3ML
1 SOLUTION RESPIRATORY (INHALATION)
Status: DISCONTINUED | OUTPATIENT
Start: 2024-02-01 | End: 2024-02-01

## 2024-02-01 RX ORDER — INSULIN LISPRO 100 [IU]/ML
0-4 INJECTION, SOLUTION INTRAVENOUS; SUBCUTANEOUS
Status: DISCONTINUED | OUTPATIENT
Start: 2024-02-01 | End: 2024-02-06 | Stop reason: HOSPADM

## 2024-02-01 RX ORDER — POTASSIUM CHLORIDE 20 MEQ/1
40 TABLET, EXTENDED RELEASE ORAL PRN
Status: DISCONTINUED | OUTPATIENT
Start: 2024-02-01 | End: 2024-02-06 | Stop reason: HOSPADM

## 2024-02-01 RX ORDER — FUROSEMIDE 10 MG/ML
40 INJECTION INTRAMUSCULAR; INTRAVENOUS 2 TIMES DAILY
Status: DISCONTINUED | OUTPATIENT
Start: 2024-02-01 | End: 2024-02-03

## 2024-02-01 RX ADMIN — SODIUM CHLORIDE, PRESERVATIVE FREE 10 ML: 5 INJECTION INTRAVENOUS at 23:00

## 2024-02-01 RX ADMIN — METHYLPREDNISOLONE SODIUM SUCCINATE 125 MG: 125 INJECTION, POWDER, FOR SOLUTION INTRAMUSCULAR; INTRAVENOUS at 14:26

## 2024-02-01 RX ADMIN — AZITHROMYCIN DIHYDRATE 250 MG: 250 TABLET ORAL at 22:59

## 2024-02-01 RX ADMIN — IPRATROPIUM BROMIDE AND ALBUTEROL SULFATE 1 DOSE: 2.5; .5 SOLUTION RESPIRATORY (INHALATION) at 14:06

## 2024-02-01 RX ADMIN — ALBUTEROL SULFATE 2.5 MG: 2.5 SOLUTION RESPIRATORY (INHALATION) at 14:04

## 2024-02-01 RX ADMIN — SODIUM CHLORIDE 500 ML: 9 INJECTION, SOLUTION INTRAVENOUS at 17:42

## 2024-02-01 RX ADMIN — CEFTRIAXONE 1000 MG: 1 INJECTION, POWDER, FOR SOLUTION INTRAMUSCULAR; INTRAVENOUS at 18:08

## 2024-02-01 RX ADMIN — SODIUM CHLORIDE 1000 ML: 9 INJECTION, SOLUTION INTRAVENOUS at 14:27

## 2024-02-01 ASSESSMENT — PAIN - FUNCTIONAL ASSESSMENT: PAIN_FUNCTIONAL_ASSESSMENT: NONE - DENIES PAIN

## 2024-02-01 NOTE — CONSULTS
Chart rev, full note to follow                      Name:  Jeromy Bojorquez /Age/Sex: 1960  (63 y.o. male)   MRN & CSN:  [id number] & [id number] Admission Date/Time: 2024  1:17 PM   Location:  ED20/ED-20 PCP: No primary care provider on file.       Hospital Day: 2          Referring physician:  Yadi Ortiz MD         Reason for consultation: HFpEF        Thanks for referral.    Information source: Patient    CC; short of breath      HPI:   Thank you for involving me in taking  care of Jeromy Bojorquez who  is a 63 y.o.year  Old male  Presents with history of COPD, chronic respiratory failure on oxygen, hypertension, diabetes noted with complains of increasing cough and shortness of breath which has been getting worse, troponin was mildly elevated cardiology been consulted for elevated troponin and possible CHF     At present patient is on BiPAP and respiratory distress however denies any chest pain                  Past medical history:    has a past medical history of Acute exacerbation of chronic obstructive pulmonary disease (COPD) (HCC), Acute on chronic respiratory failure with hypoxemia (HCC), Arthritis, Cataract, Chronic hypoxemic respiratory failure (HCC), COPD (chronic obstructive pulmonary disease) (HCC), COPD, severe (HCC), Diabetes mellitus (HCC), Enlarged prostate, Former smoker, Hyperlipidemia, Hypertension, and Restrictive lung disease.  Past surgical history:   has a past surgical history that includes Colonoscopy; skin biopsy; and Tonsillectomy.  Social History:   reports that he quit smoking about 3 years ago. His smoking use included cigarettes. He started smoking about 50 years ago. He has a 23.5 pack-year smoking history. He has never used smokeless tobacco. He reports that he does not drink alcohol and does not use drugs.  Family history:  family history is not on file.    No Known Allergies    doxycycline hyclate (VIBRA-TABS) tablet 100 mg, Once  albuterol sulfate

## 2024-02-01 NOTE — ED NOTES
[id number] perfect serve message sent to Dr Menard on in patient consult from hospitalist    [id number] Dr Menard acknowledged perfect serve message. Added to treatment team

## 2024-02-01 NOTE — ED PROVIDER NOTES
18.0 (H) 0 - 4 %    Segs Absolute 7.7 K/CU MM    Lymphocytes Absolute 1.2 K/CU MM    Monocytes Absolute 1.9 K/CU MM    Differential Type MANUAL DIFFERENTIAL     Anisocytosis 1+     PLT Morphology PLATELETS NORMAL IN NUMBER AND SIZE    CMP   Result Value Ref Range    Sodium 136 135 - 145 MMOL/L    Potassium 4.6 3.5 - 5.1 MMOL/L    Chloride 90 (L) 99 - 110 mMol/L    CO2 40 (H) 21 - 32 MMOL/L    Anion Gap 6 (L) 7 - 16    Glucose 210 (H) 70 - 99 MG/DL    BUN 22 6 - 23 MG/DL    Creatinine 0.7 (L) 0.9 - 1.3 MG/DL    Est, Glom Filt Rate >60 >60 mL/min/1.73m2    Calcium 9.0 8.3 - 10.6 MG/DL    Total Protein 7.4 6.4 - 8.2 GM/DL    Albumin 4.0 3.4 - 5.0 GM/DL    Total Bilirubin 0.6 0.0 - 1.0 MG/DL    Alkaline Phosphatase 69 40 - 128 IU/L    ALT 9 (L) 10 - 40 U/L    AST 11 (L) 15 - 37 IU/L   Troponin   Result Value Ref Range    Troponin, High Sensitivity 31 (H) 0 - 22 ng/L   Troponin   Result Value Ref Range    Troponin, High Sensitivity 28 (H) 0 - 22 ng/L   Brain Natriuretic Peptide   Result Value Ref Range    Pro-BNP 2,070 (H) <300 PG/ML   Urinalysis with Reflex to Culture    Specimen: Urine   Result Value Ref Range    Color, UA YELLOW YELLOW    Clarity, UA CLEAR CLEAR    Glucose, Urine 250 (A) NEGATIVE MG/DL    Bilirubin Urine MODERATE NUMBER OR AMOUNT OBSERVED (A) NEGATIVE MG/DL    Ketones, Urine 15 (A) NEGATIVE MG/DL    Specific Gravity, UA >1.030 1.001 - 1.035    Blood, Urine NEGATIVE NEGATIVE    pH, Urine 6.0 5.0 - 8.0    Protein, UA TRACE (A) NEGATIVE MG/DL    Urobilinogen, Urine 1.0 0.2 - 1.0 MG/DL    Nitrite Urine, Quantitative NEGATIVE NEGATIVE    Leukocyte Esterase, Urine NEGATIVE NEGATIVE   Blood Gas, Venous   Result Value Ref Range    pH, Jay 7.29 (L) 7.32 - 7.43    pCO2, Jay 92 (H) 41 - 51 mmHG    pO2, Jay 68 (H) 28 - 48 mmHG    Base Exc, Mixed 13.3 (H) 0 - 3.0    HCO3, Venous 44.2 (H) 22 - 29 MMOL/L    O2 Sat, Jay 90.8 (H) 50 - 70 %    Comment VBG    Lactate, Sepsis   Result Value Ref Range    Lactic Acid,

## 2024-02-01 NOTE — H&P
BiPAP in ED and received ceftriaxone, doxycycline, DuoNebs, methylprednisolone,.  Repeat VBG showed pCO2 82, pO2 44.  Admit to stepdown continue to monitor.     Review of Systems:        Pertinent positives and negatives discussed in HPI     Objective:   No intake or output data in the 24 hours ending 02/01/24 1659   Vitals:   Vitals:    02/01/24 1404 02/01/24 1406 02/01/24 1502 02/01/24 1611   BP:   118/76    Pulse: (!) 111 (!) 110 (!) 113 (!) 105   Resp: 24      Temp:       TempSrc:       SpO2: 93%  96% 96%   Weight:       Height:           Medications Prior to Admission     Prior to Admission medications    Medication Sig Start Date End Date Taking? Authorizing Provider   predniSONE (DELTASONE) 5 MG tablet Take 1 tablet by mouth daily 1/12/24 1/6/25  Brian Hummel MD   losartan (COZAAR) 25 MG tablet TAKE 1 TABLET BY MOUTH EVERY DAY  Patient taking differently: Take 1 tablet by mouth daily 5/4/23   Svetlana Sapp MD   glimepiride (AMARYL) 2 MG tablet TAKE 1 TABLET BY MOUTH TWICE A DAY  Patient taking differently: Take 1 tablet by mouth 2 times daily TAKE 1 TABLET BY MOUTH TWICE A DAY 5/4/23   Svetlana Sapp MD   albuterol sulfate HFA (VENTOLIN HFA) 108 (90 Base) MCG/ACT inhaler Inhale 2 puffs into the lungs every 6 hours as needed for Wheezing 3/23/23   Brian Hummel MD   ONETOUCH ULTRA strip TEST 3 TIMES A DAY AND AS NEEDED FOR SYMPTOMS OF IRREGULAR BLOOD SUAGR 2/14/23   Svetlana Sapp MD   metFORMIN (GLUCOPHAGE-XR) 500 MG extended release tablet TAKE 2 TABLETS BY MOUTH TWICE A DAY  Patient taking differently: Take 2 tablets by mouth 2 times daily 2/14/23   Svetlana Sapp MD   umeclidinium-vilanterol (ANORO ELLIPTA) 62.5-25 MCG/INH AEPB inhaler Inhale 1 puff into the lungs in the morning. 7/18/22   Brian Hummel MD   blood glucose monitor strips Test 3 times a day & as needed for symptoms of irregular blood glucose. Please fill with what is coverd by insurance 7/18/22   Svetlana Sapp MD

## 2024-02-01 NOTE — ED NOTES
Medication History  [facility]    Patient Name: Jeromy Bojorquez 1960     Medication history has been completed by: Rose [facility]    Source(s) of information: patient and insurance claims      Primary Care Physician: No primary care provider on file.     Pharmacy: [facility]    Allergies as of 02/01/2024    (No Known Allergies)        Prior to Admission medications    Medication Sig Start Date End Date Taking? Authorizing Provider   predniSONE (DELTASONE) 5 MG tablet Take 1 tablet by mouth daily 1/12/24 1/6/25  Brian Hummel MD   losartan (COZAAR) 25 MG tablet TAKE 1 TABLET BY MOUTH EVERY DAY 5/4/23   Svetlana Sapp MD   glimepiride (AMARYL) 2 MG tablet TAKE 1 TABLET BY MOUTH TWICE A DAY 5/4/23   Svetlana Sapp MD   albuterol sulfate HFA (VENTOLIN HFA) 108 (90 Base) MCG/ACT inhaler Inhale 2 puffs into the lungs every 6 hours as needed for Wheezing 3/23/23   Brian Hummel MD   ONETOUCH ULTRA strip TEST 3 TIMES A DAY AND AS NEEDED FOR SYMPTOMS OF IRREGULAR BLOOD SUAGR 2/14/23   Svetlana Sapp MD   metFORMIN (GLUCOPHAGE-XR) 500 MG extended release tablet TAKE 2 TABLETS BY MOUTH TWICE A DAY 2/14/23   Svetlana Sapp MD   umeclidinium-vilanterol (ANORO ELLIPTA) 62.5-25 MCG/INH AEPB inhaler Inhale 1 puff into the lungs in the morning. 7/18/22   Brian Hummel MD   blood glucose monitor strips Test 3 times a day & as needed for symptoms of irregular blood glucose. Please fill with what is coverd by insurance 7/18/22   Svetlana Sapp MD   Lancets MISC Use one lancet 4 times daily 7/18/22   Svetlana Sapp MD   blood glucose monitor strips Test 3-4 times a day & as needed for symptoms of irregular blood glucose. 1/17/22   Svetlana Sapp MD   blood glucose monitor kit and supplies Use 3-4 times daily 1/17/22   Svetlana Sapp MD     Comments:  Medication list reviewed with patient and insurance claims verified.    To my knowledge the above medication history is accurate as

## 2024-02-02 ENCOUNTER — APPOINTMENT (OUTPATIENT)
Dept: NON INVASIVE DIAGNOSTICS | Age: 64
DRG: 189 | End: 2024-02-02
Attending: INTERNAL MEDICINE
Payer: MEDICARE

## 2024-02-02 LAB
ALBUMIN SERPL-MCNC: 3.8 GM/DL (ref 3.4–5)
ALP BLD-CCNC: 61 IU/L (ref 40–128)
ALT SERPL-CCNC: 8 U/L (ref 10–40)
ANION GAP SERPL CALCULATED.3IONS-SCNC: 11 MMOL/L (ref 7–16)
AST SERPL-CCNC: 15 IU/L (ref 15–37)
B PARAP IS1001 DNA NPH QL NAA+NON-PROBE: NOT DETECTED
B PERT.PT PRMT NPH QL NAA+NON-PROBE: NOT DETECTED
BASOPHILS ABSOLUTE: 0 K/CU MM
BASOPHILS RELATIVE PERCENT: 0.3 % (ref 0–1)
BILIRUB SERPL-MCNC: 0.5 MG/DL (ref 0–1)
BUN SERPL-MCNC: 23 MG/DL (ref 6–23)
C PNEUM DNA NPH QL NAA+NON-PROBE: NOT DETECTED
CALCIUM SERPL-MCNC: 8.7 MG/DL (ref 8.3–10.6)
CHLORIDE BLD-SCNC: 93 MMOL/L (ref 99–110)
CO2: 34 MMOL/L (ref 21–32)
CREAT SERPL-MCNC: 0.8 MG/DL (ref 0.9–1.3)
DIFFERENTIAL TYPE: ABNORMAL
ECHO AO ROOT DIAM: 3.3 CM
ECHO AO ROOT INDEX: 1.57 CM/M2
ECHO AV AREA PEAK VELOCITY: 3.7 CM2
ECHO AV AREA VTI: 4.5 CM2
ECHO AV AREA/BSA PEAK VELOCITY: 1.8 CM2/M2
ECHO AV AREA/BSA VTI: 2.1 CM2/M2
ECHO AV MEAN GRADIENT: 4 MMHG
ECHO AV MEAN VELOCITY: 0.9 M/S
ECHO AV PEAK GRADIENT: 7 MMHG
ECHO AV PEAK VELOCITY: 1.3 M/S
ECHO AV VELOCITY RATIO: 0.69
ECHO AV VTI: 21.3 CM
ECHO BSA: 2.14 M2
ECHO LA AREA 4C: 11.6 CM2
ECHO LA DIAMETER INDEX: 1.67 CM/M2
ECHO LA DIAMETER: 3.5 CM
ECHO LA MAJOR AXIS: 4.9 CM
ECHO LA TO AORTIC ROOT RATIO: 1.06
ECHO LA VOL MOD A4C: 22 ML (ref 18–58)
ECHO LA VOLUME INDEX MOD A4C: 10 ML/M2 (ref 16–34)
ECHO LV E' LATERAL VELOCITY: 12 CM/S
ECHO LV E' SEPTAL VELOCITY: 6 CM/S
ECHO LV EDV A4C: 69 ML
ECHO LV EDV INDEX A4C: 33 ML/M2
ECHO LV EJECTION FRACTION A4C: 65 %
ECHO LV ESV A4C: 24 ML
ECHO LV ESV INDEX A4C: 11 ML/M2
ECHO LV FRACTIONAL SHORTENING: 20 % (ref 28–44)
ECHO LV INTERNAL DIMENSION DIASTOLE INDEX: 2.19 CM/M2
ECHO LV INTERNAL DIMENSION DIASTOLIC: 4.6 CM (ref 4.2–5.9)
ECHO LV INTERNAL DIMENSION SYSTOLIC INDEX: 1.76 CM/M2
ECHO LV INTERNAL DIMENSION SYSTOLIC: 3.7 CM
ECHO LV IVSD: 1 CM (ref 0.6–1)
ECHO LV MASS 2D: 158.8 G (ref 88–224)
ECHO LV MASS INDEX 2D: 75.6 G/M2 (ref 49–115)
ECHO LV POSTERIOR WALL DIASTOLIC: 1 CM (ref 0.6–1)
ECHO LV RELATIVE WALL THICKNESS RATIO: 0.43
ECHO LVOT AREA: 5.3 CM2
ECHO LVOT AV VTI INDEX: 0.85
ECHO LVOT DIAM: 2.6 CM
ECHO LVOT MEAN GRADIENT: 2 MMHG
ECHO LVOT PEAK GRADIENT: 3 MMHG
ECHO LVOT PEAK VELOCITY: 0.9 M/S
ECHO LVOT STROKE VOLUME INDEX: 45.5 ML/M2
ECHO LVOT SV: 95.5 ML
ECHO LVOT VTI: 18 CM
ECHO MV A VELOCITY: 1.17 M/S
ECHO MV E VELOCITY: 1.03 M/S
ECHO MV E/A RATIO: 0.88
ECHO MV E/E' LATERAL: 8.58
ECHO MV E/E' RATIO (AVERAGED): 12.88
ECHO RV MID DIMENSION: 4 CM
EOSINOPHILS ABSOLUTE: 0 K/CU MM
EOSINOPHILS RELATIVE PERCENT: 0 % (ref 0–3)
FLUAV H1 2009 PAN RNA NPH NAA+NON-PROBE: NOT DETECTED
FLUAV H1 RNA NPH QL NAA+NON-PROBE: NOT DETECTED
FLUAV H3 RNA NPH QL NAA+NON-PROBE: NOT DETECTED
FLUAV RNA NPH QL NAA+NON-PROBE: NOT DETECTED
FLUBV RNA NPH QL NAA+NON-PROBE: NOT DETECTED
GFR SERPL CREATININE-BSD FRML MDRD: >60 ML/MIN/1.73M2
GLUCOSE BLD-MCNC: 155 MG/DL (ref 70–99)
GLUCOSE BLD-MCNC: 227 MG/DL (ref 70–99)
GLUCOSE BLD-MCNC: 243 MG/DL (ref 70–99)
GLUCOSE BLD-MCNC: 253 MG/DL (ref 70–99)
GLUCOSE BLD-MCNC: 286 MG/DL (ref 70–99)
GLUCOSE SERPL-MCNC: 273 MG/DL (ref 70–99)
HADV DNA NPH QL NAA+NON-PROBE: NOT DETECTED
HCOV 229E RNA NPH QL NAA+NON-PROBE: NOT DETECTED
HCOV HKU1 RNA NPH QL NAA+NON-PROBE: NOT DETECTED
HCOV NL63 RNA NPH QL NAA+NON-PROBE: NOT DETECTED
HCOV OC43 RNA NPH QL NAA+NON-PROBE: ABNORMAL
HCT VFR BLD CALC: 53 % (ref 42–52)
HEMOGLOBIN: 16.4 GM/DL (ref 13.5–18)
HMPV RNA NPH QL NAA+NON-PROBE: NOT DETECTED
HPIV1 RNA NPH QL NAA+NON-PROBE: NOT DETECTED
HPIV2 RNA NPH QL NAA+NON-PROBE: NOT DETECTED
HPIV3 RNA NPH QL NAA+NON-PROBE: NOT DETECTED
HPIV4 RNA NPH QL NAA+NON-PROBE: NOT DETECTED
IMMATURE NEUTROPHIL %: 0.4 % (ref 0–0.43)
LYMPHOCYTES ABSOLUTE: 0.5 K/CU MM
LYMPHOCYTES RELATIVE PERCENT: 7.1 % (ref 24–44)
M PNEUMO DNA NPH QL NAA+NON-PROBE: NOT DETECTED
MCH RBC QN AUTO: 27.2 PG (ref 27–31)
MCHC RBC AUTO-ENTMCNC: 30.9 % (ref 32–36)
MCV RBC AUTO: 87.9 FL (ref 78–100)
MONOCYTES ABSOLUTE: 0.8 K/CU MM
MONOCYTES RELATIVE PERCENT: 10.9 % (ref 0–4)
MRSA, DNA, NASAL: NEGATIVE
NUCLEATED RBC %: 0 %
PDW BLD-RTO: 15.5 % (ref 11.7–14.9)
PLATELET # BLD: 195 K/CU MM (ref 140–440)
PMV BLD AUTO: 9.5 FL (ref 7.5–11.1)
POTASSIUM SERPL-SCNC: 4.7 MMOL/L (ref 3.5–5.1)
PROCALCITONIN SERPL-MCNC: 0.06 NG/ML
RBC # BLD: 6.03 M/CU MM (ref 4.6–6.2)
RSV RNA NPH QL NAA+NON-PROBE: NOT DETECTED
RV+EV RNA NPH QL NAA+NON-PROBE: NOT DETECTED
SARS-COV-2 RNA NPH QL NAA+NON-PROBE: NOT DETECTED
SEGMENTED NEUTROPHILS ABSOLUTE COUNT: 5.6 K/CU MM
SEGMENTED NEUTROPHILS RELATIVE PERCENT: 81.3 % (ref 36–66)
SODIUM BLD-SCNC: 138 MMOL/L (ref 135–145)
SPECIMEN DESCRIPTION: NORMAL
TOTAL IMMATURE NEUTOROPHIL: 0.03 K/CU MM
TOTAL NUCLEATED RBC: 0 K/CU MM
TOTAL PROTEIN: 6.9 GM/DL (ref 6.4–8.2)
WBC # BLD: 6.9 K/CU MM (ref 4–10.5)

## 2024-02-02 PROCEDURE — 83880 ASSAY OF NATRIURETIC PEPTIDE: CPT

## 2024-02-02 PROCEDURE — 36415 COLL VENOUS BLD VENIPUNCTURE: CPT

## 2024-02-02 PROCEDURE — 83735 ASSAY OF MAGNESIUM: CPT

## 2024-02-02 PROCEDURE — 6370000000 HC RX 637 (ALT 250 FOR IP): Performed by: STUDENT IN AN ORGANIZED HEALTH CARE EDUCATION/TRAINING PROGRAM

## 2024-02-02 PROCEDURE — 0202U NFCT DS 22 TRGT SARS-COV-2: CPT

## 2024-02-02 PROCEDURE — 80053 COMPREHEN METABOLIC PANEL: CPT

## 2024-02-02 PROCEDURE — 93306 TTE W/DOPPLER COMPLETE: CPT | Performed by: INTERNAL MEDICINE

## 2024-02-02 PROCEDURE — 2580000003 HC RX 258: Performed by: STUDENT IN AN ORGANIZED HEALTH CARE EDUCATION/TRAINING PROGRAM

## 2024-02-02 PROCEDURE — 84100 ASSAY OF PHOSPHORUS: CPT

## 2024-02-02 PROCEDURE — 94150 VITAL CAPACITY TEST: CPT

## 2024-02-02 PROCEDURE — 85025 COMPLETE CBC W/AUTO DIFF WBC: CPT

## 2024-02-02 PROCEDURE — 76937 US GUIDE VASCULAR ACCESS: CPT

## 2024-02-02 PROCEDURE — 87641 MR-STAPH DNA AMP PROBE: CPT

## 2024-02-02 PROCEDURE — 6370000000 HC RX 637 (ALT 250 FOR IP): Performed by: INTERNAL MEDICINE

## 2024-02-02 PROCEDURE — C8929 TTE W OR WO FOL WCON,DOPPLER: HCPCS

## 2024-02-02 PROCEDURE — 2140000000 HC CCU INTERMEDIATE R&B

## 2024-02-02 PROCEDURE — 6360000002 HC RX W HCPCS: Performed by: STUDENT IN AN ORGANIZED HEALTH CARE EDUCATION/TRAINING PROGRAM

## 2024-02-02 PROCEDURE — 94761 N-INVAS EAR/PLS OXIMETRY MLT: CPT

## 2024-02-02 PROCEDURE — 6360000002 HC RX W HCPCS: Performed by: INTERNAL MEDICINE

## 2024-02-02 PROCEDURE — 84145 PROCALCITONIN (PCT): CPT

## 2024-02-02 PROCEDURE — 82962 GLUCOSE BLOOD TEST: CPT

## 2024-02-02 PROCEDURE — 6360000004 HC RX CONTRAST MEDICATION

## 2024-02-02 PROCEDURE — 99222 1ST HOSP IP/OBS MODERATE 55: CPT | Performed by: INTERNAL MEDICINE

## 2024-02-02 PROCEDURE — 94640 AIRWAY INHALATION TREATMENT: CPT

## 2024-02-02 PROCEDURE — 2700000000 HC OXYGEN THERAPY PER DAY

## 2024-02-02 PROCEDURE — 94669 MECHANICAL CHEST WALL OSCILL: CPT

## 2024-02-02 PROCEDURE — 94660 CPAP INITIATION&MGMT: CPT

## 2024-02-02 RX ORDER — ALBUTEROL SULFATE 90 UG/1
2 AEROSOL, METERED RESPIRATORY (INHALATION)
Status: DISCONTINUED | OUTPATIENT
Start: 2024-02-02 | End: 2024-02-06 | Stop reason: HOSPADM

## 2024-02-02 RX ORDER — IPRATROPIUM BROMIDE AND ALBUTEROL SULFATE 2.5; .5 MG/3ML; MG/3ML
1 SOLUTION RESPIRATORY (INHALATION)
Status: DISCONTINUED | OUTPATIENT
Start: 2024-02-02 | End: 2024-02-02

## 2024-02-02 RX ORDER — GUAIFENESIN 600 MG/1
600 TABLET, EXTENDED RELEASE ORAL 2 TIMES DAILY
Status: DISCONTINUED | OUTPATIENT
Start: 2024-02-02 | End: 2024-02-06 | Stop reason: HOSPADM

## 2024-02-02 RX ADMIN — INSULIN LISPRO 2 UNITS: 100 INJECTION, SOLUTION INTRAVENOUS; SUBCUTANEOUS at 14:00

## 2024-02-02 RX ADMIN — INSULIN GLARGINE 10 UNITS: 100 INJECTION, SOLUTION SUBCUTANEOUS at 20:56

## 2024-02-02 RX ADMIN — GUAIFENESIN 600 MG: 600 TABLET, EXTENDED RELEASE ORAL at 14:09

## 2024-02-02 RX ADMIN — GUAIFENESIN 600 MG: 600 TABLET, EXTENDED RELEASE ORAL at 20:56

## 2024-02-02 RX ADMIN — SODIUM CHLORIDE, PRESERVATIVE FREE 10 ML: 5 INJECTION INTRAVENOUS at 08:24

## 2024-02-02 RX ADMIN — FUROSEMIDE 40 MG: 10 INJECTION, SOLUTION INTRAMUSCULAR; INTRAVENOUS at 18:14

## 2024-02-02 RX ADMIN — SODIUM CHLORIDE, PRESERVATIVE FREE 10 ML: 5 INJECTION INTRAVENOUS at 19:49

## 2024-02-02 RX ADMIN — METHYLPREDNISOLONE SODIUM SUCCINATE 40 MG: 40 INJECTION, POWDER, FOR SOLUTION INTRAMUSCULAR; INTRAVENOUS at 20:56

## 2024-02-02 RX ADMIN — IPRATROPIUM BROMIDE 2 PUFF: 17 AEROSOL, METERED RESPIRATORY (INHALATION) at 15:03

## 2024-02-02 RX ADMIN — METHYLPREDNISOLONE SODIUM SUCCINATE 40 MG: 40 INJECTION, POWDER, FOR SOLUTION INTRAMUSCULAR; INTRAVENOUS at 13:59

## 2024-02-02 RX ADMIN — FUROSEMIDE 40 MG: 10 INJECTION, SOLUTION INTRAMUSCULAR; INTRAVENOUS at 08:24

## 2024-02-02 RX ADMIN — PERFLUTREN 2.2 ML: 6.52 INJECTION, SUSPENSION INTRAVENOUS at 08:02

## 2024-02-02 RX ADMIN — CEFTRIAXONE 1000 MG: 1 INJECTION, POWDER, FOR SOLUTION INTRAMUSCULAR; INTRAVENOUS at 18:23

## 2024-02-02 RX ADMIN — METHYLPREDNISOLONE SODIUM SUCCINATE 40 MG: 40 INJECTION, POWDER, FOR SOLUTION INTRAMUSCULAR; INTRAVENOUS at 03:22

## 2024-02-02 RX ADMIN — INSULIN LISPRO 3 UNITS: 100 INJECTION, SOLUTION INTRAVENOUS; SUBCUTANEOUS at 13:59

## 2024-02-02 RX ADMIN — LOSARTAN POTASSIUM 25 MG: 25 TABLET, FILM COATED ORAL at 08:24

## 2024-02-02 RX ADMIN — ALBUTEROL SULFATE 2 PUFF: 90 AEROSOL, METERED RESPIRATORY (INHALATION) at 15:00

## 2024-02-02 RX ADMIN — AZITHROMYCIN DIHYDRATE 250 MG: 250 TABLET ORAL at 08:24

## 2024-02-02 RX ADMIN — INSULIN LISPRO 3 UNITS: 100 INJECTION, SOLUTION INTRAVENOUS; SUBCUTANEOUS at 18:15

## 2024-02-02 RX ADMIN — ENOXAPARIN SODIUM 40 MG: 100 INJECTION SUBCUTANEOUS at 18:14

## 2024-02-02 NOTE — DISCHARGE INSTRUCTIONS
1. Call to schedule follow up hospital follow up appointment:   [facility]  [address]  [address]  [place], [place] [address]  Tel: [phone]    [facility]  [address]  [place], [place] [address]  [phone]     [facility]   [address], [place]   [phone]     2. Establish care with a primary care provider  Physician Finder [phone]     [facility]  [address], [address], [place] [address]  [phone]  MIGUEL Salmeron,CNP    [facility]  [address] [place] [address]  [phone]  MD Veena Antonio MD Deanna N. Smith, APRN, CNP  Manuela Lilly, MIGUEL Srinivasan, CNP     [facility]  [address], [place] [address]  [phone]  Stacy Bustamante, DO Reji Christine, PAMD Adan Lugo, PAJADE    [facility]  [address] [place] [address]  [phone]  Ivon Kim MD    [facility]  [address] [place] [address] [phone]  MD Farhan Kelly MD    [facility]  [address] [place] [address]  [phone]  MIGUEL Biswas, CNP  MIGUEL Johnson, CNP   MD Sheri Nathan, PAÁlvaroC   Noel Perry MD    [facility] (*Active Waiting List*)   [address] [place], [place]  [phone]    Dr. Angelo Stallworth MD  [address] [place] [address]  [phone]    [facility]  [address], [place] [address] [phone]    [facility]  [address]  , [place] [address] [phone]  DO Pavan Saleh

## 2024-02-02 NOTE — ED NOTES
ED TO INPATIENT SBAR HANDOFF    Patient Name: Jeromy Bojorquez   :  1960  63 y.o.   Preferred Name  Jeromy  Family/Caregiver Present no   Restraints no   C-SSRS: Risk of Suicide: No Risk  Sitter no   Sepsis Risk Score Sepsis Risk Score: 3.27      Situation  Chief Complaint   Patient presents with    Shortness of Breath     Brief Description of Patient's Condition: 63 y.o. male who presents with cough and shortness of breath.  Patient has a history of COPD on 2 L nasal cannula chronically, diabetes, hypertension, hyperlipidemia, former smoker.  Patient states in the end of January he brought home with his sister from the hospital where she was admitted for influenza.  Afterwards the patient started to not feel well.  Over the last few days has had worsening cough and today he felt severely short of breath.  He has a follow-up appointment with his pulmonologist next month he was told to discontinue breathing treatments and is on a controller inhaler.  Patient endorses some chest tightness which he believes is just from his shortness of breath does not hurt particularly.  No syncope palpitations.  No fevers but endorses feeling warm today.     Patient brought in via EMS.  Brought in on 15 L nonrebreather.Mental Status: Alert &oriented  Arrived from: home    Imaging:   XR CHEST PORTABLE   Final Result   Unchanged elevated right hemidiaphragm      Mild pulmonary vascular congestion           Abnormal labs:   Abnormal Labs Reviewed   CBC WITH AUTO DIFFERENTIAL - Abnormal; Notable for the following components:       Result Value    WBC 10.8 (*)     RBC 6.44 (*)     Hematocrit 56.5 (*)     MCH 26.7 (*)     MCHC 30.4 (*)     RDW 15.6 (*)     Segs Relative 71.0 (*)     Lymphocytes % 11.0 (*)     Monocytes % 18.0 (*)     All other components within normal limits   COMPREHENSIVE METABOLIC PANEL - Abnormal; Notable for the following components:    Chloride 90 (*)     CO2 40 (*)     Anion Gap 6 (*)     Glucose

## 2024-02-02 NOTE — CONSULTS
Consult completed. Nexiva 20g 1.75\" peripheral IV initiated into left forearm x 1 attempt using ultrasound guided technique. Brisk blood return, flushes without resistance. Patient tolerated well.      Consult the Vascular Access Team for questions, concerns, or change in patient's condition.

## 2024-02-02 NOTE — ED NOTES
ED TO INPATIENT SBAR HANDOFF    Patient Name: Jeromy Bojorquez   :  1960  63 y.o.   Preferred Name  Marcelo  Family/Caregiver Present no   Restraints no   C-SSRS: Risk of Suicide: No Risk  Sitter no   Sepsis Risk Score Sepsis Risk Score: 3.27      Situation  Chief Complaint   Patient presents with    Shortness of Breath     Brief Description of Patient's Condition: 63 y.o. male who presents with cough and shortness of breath.  Patient has a history of COPD on 2 L nasal cannula chronically, diabetes, hypertension, hyperlipidemia, former smoker.  Patient states in the end of January he brought home with his sister from the hospital where she was admitted for influenza.  Afterwards the patient started to not feel well.  Over the last few days has had worsening cough and today he felt severely short of breath.  He has a follow-up appointment with his pulmonologist next month he was told to discontinue breathing treatments and is on a controller inhaler.  Patient endorses some chest tightness which he believes is just from his shortness of breath does not hurt particularly.  No syncope palpitations.  No fevers but endorses feeling warm today.     Patient brought in via EMS.  Brought in on 15 L nonrebreather.Mental Status: Alert &oriented  Mental Status: oriented and alert  Arrived from: hpme    Imaging:   XR CHEST PORTABLE   Final Result   Unchanged elevated right hemidiaphragm      Mild pulmonary vascular congestion           Abnormal labs:   Abnormal Labs Reviewed   CBC WITH AUTO DIFFERENTIAL - Abnormal; Notable for the following components:       Result Value    WBC 10.8 (*)     RBC 6.44 (*)     Hematocrit 56.5 (*)     MCH 26.7 (*)     MCHC 30.4 (*)     RDW 15.6 (*)     Segs Relative 71.0 (*)     Lymphocytes % 11.0 (*)     Monocytes % 18.0 (*)     All other components within normal limits   COMPREHENSIVE METABOLIC PANEL - Abnormal; Notable for the following components:    Chloride 90 (*)     CO2 40 (*)      Universal Safety Interventions

## 2024-02-02 NOTE — CARE COORDINATION
02/02/24 1150   Service Assessment   Patient Orientation Alert and Oriented   Cognition Alert   History Provided By Medical Record   Primary Caregiver Self   Support Systems Family Members   Patient's Healthcare Decision Maker is: Legal Next of Kin   PCP Verified by CM No  (PCP list added to discharge instructions)   Prior Functional Level Independent in ADLs/IADLs   Current Functional Level Independent in ADLs/IADLs   Can patient return to prior living arrangement Yes   Ability to make needs known: Good   Family able to assist with home care needs: Yes   Would you like for me to discuss the discharge plan with any other family members/significant others, and if so, who? No   Financial Resources Medicare;Medicaid   Community Resources None     Chart reviewed, screened for discharge planning.  Pt from home with his sister.  PCP list added to discharge instructions.  No discharge needs identified at this time.  CM following

## 2024-02-03 LAB
ALBUMIN SERPL-MCNC: 3.8 GM/DL (ref 3.4–5)
ALP BLD-CCNC: 57 IU/L (ref 40–128)
ALT SERPL-CCNC: 13 U/L (ref 10–40)
ANION GAP SERPL CALCULATED.3IONS-SCNC: 8 MMOL/L (ref 7–16)
AST SERPL-CCNC: 23 IU/L (ref 15–37)
BASOPHILS ABSOLUTE: 0 K/CU MM
BASOPHILS RELATIVE PERCENT: 0.1 % (ref 0–1)
BILIRUB SERPL-MCNC: 0.5 MG/DL (ref 0–1)
BUN SERPL-MCNC: 29 MG/DL (ref 6–23)
CALCIUM SERPL-MCNC: 8.5 MG/DL (ref 8.3–10.6)
CHLORIDE BLD-SCNC: 87 MMOL/L (ref 99–110)
CO2: 39 MMOL/L (ref 21–32)
CREAT SERPL-MCNC: 0.7 MG/DL (ref 0.9–1.3)
DIFFERENTIAL TYPE: ABNORMAL
EOSINOPHILS ABSOLUTE: 0 K/CU MM
EOSINOPHILS RELATIVE PERCENT: 0 % (ref 0–3)
GFR SERPL CREATININE-BSD FRML MDRD: >60 ML/MIN/1.73M2
GLUCOSE BLD-MCNC: 234 MG/DL (ref 70–99)
GLUCOSE BLD-MCNC: 239 MG/DL (ref 70–99)
GLUCOSE BLD-MCNC: 278 MG/DL (ref 70–99)
GLUCOSE BLD-MCNC: 323 MG/DL (ref 70–99)
GLUCOSE SERPL-MCNC: 272 MG/DL (ref 70–99)
HCT VFR BLD CALC: 51.2 % (ref 42–52)
HEMOGLOBIN: 15.9 GM/DL (ref 13.5–18)
IMMATURE NEUTROPHIL %: 0.5 % (ref 0–0.43)
LYMPHOCYTES ABSOLUTE: 0.6 K/CU MM
LYMPHOCYTES RELATIVE PERCENT: 5.1 % (ref 24–44)
MAGNESIUM: 2.3 MG/DL (ref 1.8–2.4)
MCH RBC QN AUTO: 26.9 PG (ref 27–31)
MCHC RBC AUTO-ENTMCNC: 31.1 % (ref 32–36)
MCV RBC AUTO: 86.6 FL (ref 78–100)
MONOCYTES ABSOLUTE: 0.5 K/CU MM
MONOCYTES RELATIVE PERCENT: 4.2 % (ref 0–4)
NUCLEATED RBC %: 0 %
PDW BLD-RTO: 15.1 % (ref 11.7–14.9)
PHOSPHORUS: 3 MG/DL (ref 2.5–4.9)
PLATELET # BLD: 190 K/CU MM (ref 140–440)
PMV BLD AUTO: 9.5 FL (ref 7.5–11.1)
POTASSIUM SERPL-SCNC: 4.2 MMOL/L (ref 3.5–5.1)
PRO-BNP: 212.2 PG/ML
PROCALCITONIN SERPL-MCNC: 0.05 NG/ML
RBC # BLD: 5.91 M/CU MM (ref 4.6–6.2)
SEGMENTED NEUTROPHILS ABSOLUTE COUNT: 9.8 K/CU MM
SEGMENTED NEUTROPHILS RELATIVE PERCENT: 90.1 % (ref 36–66)
SODIUM BLD-SCNC: 134 MMOL/L (ref 135–145)
TOTAL IMMATURE NEUTOROPHIL: 0.05 K/CU MM
TOTAL NUCLEATED RBC: 0 K/CU MM
TOTAL PROTEIN: 6.4 GM/DL (ref 6.4–8.2)
WBC # BLD: 10.8 K/CU MM (ref 4–10.5)

## 2024-02-03 PROCEDURE — 94669 MECHANICAL CHEST WALL OSCILL: CPT

## 2024-02-03 PROCEDURE — 6370000000 HC RX 637 (ALT 250 FOR IP): Performed by: INTERNAL MEDICINE

## 2024-02-03 PROCEDURE — 94640 AIRWAY INHALATION TREATMENT: CPT

## 2024-02-03 PROCEDURE — 2700000000 HC OXYGEN THERAPY PER DAY

## 2024-02-03 PROCEDURE — 2140000000 HC CCU INTERMEDIATE R&B

## 2024-02-03 PROCEDURE — 6360000002 HC RX W HCPCS: Performed by: STUDENT IN AN ORGANIZED HEALTH CARE EDUCATION/TRAINING PROGRAM

## 2024-02-03 PROCEDURE — 2580000003 HC RX 258: Performed by: STUDENT IN AN ORGANIZED HEALTH CARE EDUCATION/TRAINING PROGRAM

## 2024-02-03 PROCEDURE — 82962 GLUCOSE BLOOD TEST: CPT

## 2024-02-03 PROCEDURE — 6360000002 HC RX W HCPCS: Performed by: INTERNAL MEDICINE

## 2024-02-03 PROCEDURE — 99232 SBSQ HOSP IP/OBS MODERATE 35: CPT | Performed by: INTERNAL MEDICINE

## 2024-02-03 PROCEDURE — APPSS60 APP SPLIT SHARED TIME 46-60 MINUTES: Performed by: NURSE PRACTITIONER

## 2024-02-03 PROCEDURE — 94761 N-INVAS EAR/PLS OXIMETRY MLT: CPT

## 2024-02-03 PROCEDURE — 6370000000 HC RX 637 (ALT 250 FOR IP): Performed by: STUDENT IN AN ORGANIZED HEALTH CARE EDUCATION/TRAINING PROGRAM

## 2024-02-03 RX ORDER — FUROSEMIDE 10 MG/ML
40 INJECTION INTRAMUSCULAR; INTRAVENOUS DAILY
Status: DISCONTINUED | OUTPATIENT
Start: 2024-02-04 | End: 2024-02-03

## 2024-02-03 RX ADMIN — METHYLPREDNISOLONE SODIUM SUCCINATE 40 MG: 40 INJECTION, POWDER, FOR SOLUTION INTRAMUSCULAR; INTRAVENOUS at 04:08

## 2024-02-03 RX ADMIN — INSULIN LISPRO 2 UNITS: 100 INJECTION, SOLUTION INTRAVENOUS; SUBCUTANEOUS at 13:39

## 2024-02-03 RX ADMIN — ALBUTEROL SULFATE 2 PUFF: 90 AEROSOL, METERED RESPIRATORY (INHALATION) at 12:03

## 2024-02-03 RX ADMIN — INSULIN LISPRO 3 UNITS: 100 INJECTION, SOLUTION INTRAVENOUS; SUBCUTANEOUS at 13:39

## 2024-02-03 RX ADMIN — SODIUM CHLORIDE, PRESERVATIVE FREE 10 ML: 5 INJECTION INTRAVENOUS at 21:55

## 2024-02-03 RX ADMIN — INSULIN LISPRO 3 UNITS: 100 INJECTION, SOLUTION INTRAVENOUS; SUBCUTANEOUS at 17:40

## 2024-02-03 RX ADMIN — SODIUM CHLORIDE, PRESERVATIVE FREE 10 ML: 5 INJECTION INTRAVENOUS at 08:44

## 2024-02-03 RX ADMIN — FUROSEMIDE 40 MG: 10 INJECTION, SOLUTION INTRAMUSCULAR; INTRAVENOUS at 08:43

## 2024-02-03 RX ADMIN — GUAIFENESIN 600 MG: 600 TABLET, EXTENDED RELEASE ORAL at 21:55

## 2024-02-03 RX ADMIN — AZITHROMYCIN DIHYDRATE 250 MG: 250 TABLET ORAL at 08:43

## 2024-02-03 RX ADMIN — INSULIN LISPRO 1 UNITS: 100 INJECTION, SOLUTION INTRAVENOUS; SUBCUTANEOUS at 08:43

## 2024-02-03 RX ADMIN — IPRATROPIUM BROMIDE 2 PUFF: 17 AEROSOL, METERED RESPIRATORY (INHALATION) at 07:40

## 2024-02-03 RX ADMIN — DEXAMETHASONE 6 MG: 4 TABLET ORAL at 13:39

## 2024-02-03 RX ADMIN — INSULIN LISPRO 3 UNITS: 100 INJECTION, SOLUTION INTRAVENOUS; SUBCUTANEOUS at 08:43

## 2024-02-03 RX ADMIN — CEFTRIAXONE 1000 MG: 1 INJECTION, POWDER, FOR SOLUTION INTRAMUSCULAR; INTRAVENOUS at 17:41

## 2024-02-03 RX ADMIN — GUAIFENESIN 600 MG: 600 TABLET, EXTENDED RELEASE ORAL at 08:43

## 2024-02-03 RX ADMIN — LOSARTAN POTASSIUM 25 MG: 25 TABLET, FILM COATED ORAL at 08:43

## 2024-02-03 RX ADMIN — IPRATROPIUM BROMIDE 2 PUFF: 17 AEROSOL, METERED RESPIRATORY (INHALATION) at 15:24

## 2024-02-03 RX ADMIN — ENOXAPARIN SODIUM 40 MG: 100 INJECTION SUBCUTANEOUS at 17:40

## 2024-02-03 RX ADMIN — INSULIN GLARGINE 10 UNITS: 100 INJECTION, SOLUTION SUBCUTANEOUS at 21:55

## 2024-02-03 RX ADMIN — ALBUTEROL SULFATE 2 PUFF: 90 AEROSOL, METERED RESPIRATORY (INHALATION) at 15:24

## 2024-02-03 RX ADMIN — ALBUTEROL SULFATE 2 PUFF: 90 AEROSOL, METERED RESPIRATORY (INHALATION) at 07:40

## 2024-02-03 RX ADMIN — IPRATROPIUM BROMIDE 2 PUFF: 17 AEROSOL, METERED RESPIRATORY (INHALATION) at 12:03

## 2024-02-03 NOTE — PLAN OF CARE
Problem: Discharge Planning  Goal: Discharge to home or other facility with appropriate resources  2/3/2024 0844 by Carleen Pearce RN  Outcome: Progressing  2/2/2024 2353 by Sylvie Thibodeaux RN  Outcome: Progressing     Problem: Pain  Goal: Verbalizes/displays adequate comfort level or baseline comfort level  2/3/2024 0844 by Carleen Pearce RN  Outcome: Progressing  2/2/2024 2353 by Sylvie Thibodeaux RN  Outcome: Progressing     Problem: Chronic Conditions and Co-morbidities  Goal: Patient's chronic conditions and co-morbidity symptoms are monitored and maintained or improved  2/3/2024 0844 by Carleen Pearce RN  Outcome: Progressing  2/2/2024 2353 by Sylvie Thibodeaux RN  Outcome: Progressing     Problem: Safety - Adult  Goal: Free from fall injury  Outcome: Progressing

## 2024-02-04 LAB
ANION GAP SERPL CALCULATED.3IONS-SCNC: 5 MMOL/L (ref 7–16)
BASOPHILS ABSOLUTE: 0 K/CU MM
BASOPHILS RELATIVE PERCENT: 0.2 % (ref 0–1)
BUN SERPL-MCNC: 26 MG/DL (ref 6–23)
CALCIUM SERPL-MCNC: 9 MG/DL (ref 8.3–10.6)
CHLORIDE BLD-SCNC: 88 MMOL/L (ref 99–110)
CO2: 43 MMOL/L (ref 21–32)
CREAT SERPL-MCNC: 0.7 MG/DL (ref 0.9–1.3)
DIFFERENTIAL TYPE: ABNORMAL
EOSINOPHILS ABSOLUTE: 0 K/CU MM
EOSINOPHILS RELATIVE PERCENT: 0 % (ref 0–3)
GFR SERPL CREATININE-BSD FRML MDRD: >60 ML/MIN/1.73M2
GLUCOSE BLD-MCNC: 184 MG/DL (ref 70–99)
GLUCOSE BLD-MCNC: 202 MG/DL (ref 70–99)
GLUCOSE BLD-MCNC: 240 MG/DL (ref 70–99)
GLUCOSE BLD-MCNC: 368 MG/DL (ref 70–99)
GLUCOSE SERPL-MCNC: 219 MG/DL (ref 70–99)
HCT VFR BLD CALC: 52.4 % (ref 42–52)
HEMOGLOBIN: 16.3 GM/DL (ref 13.5–18)
IMMATURE NEUTROPHIL %: 0.3 % (ref 0–0.43)
LYMPHOCYTES ABSOLUTE: 1.2 K/CU MM
LYMPHOCYTES RELATIVE PERCENT: 9.2 % (ref 24–44)
MCH RBC QN AUTO: 26.8 PG (ref 27–31)
MCHC RBC AUTO-ENTMCNC: 31.1 % (ref 32–36)
MCV RBC AUTO: 86.2 FL (ref 78–100)
MONOCYTES ABSOLUTE: 1.6 K/CU MM
MONOCYTES RELATIVE PERCENT: 12.4 % (ref 0–4)
NUCLEATED RBC %: 0 %
PDW BLD-RTO: 14.9 % (ref 11.7–14.9)
PLATELET # BLD: 203 K/CU MM (ref 140–440)
PMV BLD AUTO: 9.1 FL (ref 7.5–11.1)
POTASSIUM SERPL-SCNC: 4.7 MMOL/L (ref 3.5–5.1)
RBC # BLD: 6.08 M/CU MM (ref 4.6–6.2)
SEGMENTED NEUTROPHILS ABSOLUTE COUNT: 9.8 K/CU MM
SEGMENTED NEUTROPHILS RELATIVE PERCENT: 77.9 % (ref 36–66)
SODIUM BLD-SCNC: 136 MMOL/L (ref 135–145)
TOTAL IMMATURE NEUTOROPHIL: 0.04 K/CU MM
TOTAL NUCLEATED RBC: 0 K/CU MM
WBC # BLD: 12.6 K/CU MM (ref 4–10.5)

## 2024-02-04 PROCEDURE — 89220 SPUTUM SPECIMEN COLLECTION: CPT

## 2024-02-04 PROCEDURE — 6370000000 HC RX 637 (ALT 250 FOR IP): Performed by: INTERNAL MEDICINE

## 2024-02-04 PROCEDURE — 85025 COMPLETE CBC W/AUTO DIFF WBC: CPT

## 2024-02-04 PROCEDURE — 82962 GLUCOSE BLOOD TEST: CPT

## 2024-02-04 PROCEDURE — 2580000003 HC RX 258: Performed by: STUDENT IN AN ORGANIZED HEALTH CARE EDUCATION/TRAINING PROGRAM

## 2024-02-04 PROCEDURE — 6370000000 HC RX 637 (ALT 250 FOR IP): Performed by: NURSE PRACTITIONER

## 2024-02-04 PROCEDURE — 94669 MECHANICAL CHEST WALL OSCILL: CPT

## 2024-02-04 PROCEDURE — 6360000002 HC RX W HCPCS: Performed by: STUDENT IN AN ORGANIZED HEALTH CARE EDUCATION/TRAINING PROGRAM

## 2024-02-04 PROCEDURE — 94150 VITAL CAPACITY TEST: CPT

## 2024-02-04 PROCEDURE — 80048 BASIC METABOLIC PNL TOTAL CA: CPT

## 2024-02-04 PROCEDURE — 6370000000 HC RX 637 (ALT 250 FOR IP): Performed by: STUDENT IN AN ORGANIZED HEALTH CARE EDUCATION/TRAINING PROGRAM

## 2024-02-04 PROCEDURE — 94640 AIRWAY INHALATION TREATMENT: CPT

## 2024-02-04 PROCEDURE — 36415 COLL VENOUS BLD VENIPUNCTURE: CPT

## 2024-02-04 PROCEDURE — 2140000000 HC CCU INTERMEDIATE R&B

## 2024-02-04 PROCEDURE — 94664 DEMO&/EVAL PT USE INHALER: CPT

## 2024-02-04 RX ORDER — INSULIN LISPRO 100 [IU]/ML
4 INJECTION, SOLUTION INTRAVENOUS; SUBCUTANEOUS ONCE
Status: COMPLETED | OUTPATIENT
Start: 2024-02-04 | End: 2024-02-04

## 2024-02-04 RX ORDER — FUROSEMIDE 10 MG/ML
40 INJECTION INTRAMUSCULAR; INTRAVENOUS ONCE
Status: COMPLETED | OUTPATIENT
Start: 2024-02-04 | End: 2024-02-04

## 2024-02-04 RX ADMIN — CEFTRIAXONE 1000 MG: 1 INJECTION, POWDER, FOR SOLUTION INTRAMUSCULAR; INTRAVENOUS at 17:59

## 2024-02-04 RX ADMIN — DEXAMETHASONE 6 MG: 4 TABLET ORAL at 10:16

## 2024-02-04 RX ADMIN — LOSARTAN POTASSIUM 25 MG: 25 TABLET, FILM COATED ORAL at 10:13

## 2024-02-04 RX ADMIN — AZITHROMYCIN DIHYDRATE 250 MG: 250 TABLET ORAL at 10:13

## 2024-02-04 RX ADMIN — ALBUTEROL SULFATE 2 PUFF: 90 AEROSOL, METERED RESPIRATORY (INHALATION) at 12:10

## 2024-02-04 RX ADMIN — INSULIN LISPRO 3 UNITS: 100 INJECTION, SOLUTION INTRAVENOUS; SUBCUTANEOUS at 12:07

## 2024-02-04 RX ADMIN — INSULIN LISPRO 4 UNITS: 100 INJECTION, SOLUTION INTRAVENOUS; SUBCUTANEOUS at 20:14

## 2024-02-04 RX ADMIN — GUAIFENESIN 600 MG: 600 TABLET, EXTENDED RELEASE ORAL at 10:13

## 2024-02-04 RX ADMIN — ENOXAPARIN SODIUM 40 MG: 100 INJECTION SUBCUTANEOUS at 18:00

## 2024-02-04 RX ADMIN — IPRATROPIUM BROMIDE 2 PUFF: 17 AEROSOL, METERED RESPIRATORY (INHALATION) at 12:11

## 2024-02-04 RX ADMIN — INSULIN LISPRO 3 UNITS: 100 INJECTION, SOLUTION INTRAVENOUS; SUBCUTANEOUS at 17:59

## 2024-02-04 RX ADMIN — INSULIN GLARGINE 10 UNITS: 100 INJECTION, SOLUTION SUBCUTANEOUS at 20:14

## 2024-02-04 RX ADMIN — INSULIN LISPRO 1 UNITS: 100 INJECTION, SOLUTION INTRAVENOUS; SUBCUTANEOUS at 17:59

## 2024-02-04 RX ADMIN — FUROSEMIDE 40 MG: 10 INJECTION, SOLUTION INTRAMUSCULAR; INTRAVENOUS at 10:13

## 2024-02-04 RX ADMIN — INSULIN LISPRO 1 UNITS: 100 INJECTION, SOLUTION INTRAVENOUS; SUBCUTANEOUS at 10:14

## 2024-02-04 RX ADMIN — INSULIN LISPRO 3 UNITS: 100 INJECTION, SOLUTION INTRAVENOUS; SUBCUTANEOUS at 10:15

## 2024-02-04 RX ADMIN — ALBUTEROL SULFATE 2 PUFF: 90 AEROSOL, METERED RESPIRATORY (INHALATION) at 07:15

## 2024-02-04 RX ADMIN — IPRATROPIUM BROMIDE 2 PUFF: 17 AEROSOL, METERED RESPIRATORY (INHALATION) at 07:15

## 2024-02-04 RX ADMIN — SODIUM CHLORIDE, PRESERVATIVE FREE 10 ML: 5 INJECTION INTRAVENOUS at 20:15

## 2024-02-04 RX ADMIN — GUAIFENESIN 600 MG: 600 TABLET, EXTENDED RELEASE ORAL at 20:15

## 2024-02-04 RX ADMIN — INSULIN LISPRO 4 UNITS: 100 INJECTION, SOLUTION INTRAVENOUS; SUBCUTANEOUS at 20:13

## 2024-02-05 LAB
GLUCOSE BLD-MCNC: 107 MG/DL (ref 70–99)
GLUCOSE BLD-MCNC: 145 MG/DL (ref 70–99)
GLUCOSE BLD-MCNC: 273 MG/DL (ref 70–99)
GLUCOSE BLD-MCNC: 326 MG/DL (ref 70–99)

## 2024-02-05 PROCEDURE — 94761 N-INVAS EAR/PLS OXIMETRY MLT: CPT

## 2024-02-05 PROCEDURE — 94150 VITAL CAPACITY TEST: CPT

## 2024-02-05 PROCEDURE — 94669 MECHANICAL CHEST WALL OSCILL: CPT

## 2024-02-05 PROCEDURE — 6370000000 HC RX 637 (ALT 250 FOR IP): Performed by: INTERNAL MEDICINE

## 2024-02-05 PROCEDURE — 6370000000 HC RX 637 (ALT 250 FOR IP): Performed by: STUDENT IN AN ORGANIZED HEALTH CARE EDUCATION/TRAINING PROGRAM

## 2024-02-05 PROCEDURE — 6360000002 HC RX W HCPCS: Performed by: STUDENT IN AN ORGANIZED HEALTH CARE EDUCATION/TRAINING PROGRAM

## 2024-02-05 PROCEDURE — 2700000000 HC OXYGEN THERAPY PER DAY

## 2024-02-05 PROCEDURE — 94640 AIRWAY INHALATION TREATMENT: CPT

## 2024-02-05 PROCEDURE — 82962 GLUCOSE BLOOD TEST: CPT

## 2024-02-05 PROCEDURE — 2580000003 HC RX 258: Performed by: STUDENT IN AN ORGANIZED HEALTH CARE EDUCATION/TRAINING PROGRAM

## 2024-02-05 PROCEDURE — 2140000000 HC CCU INTERMEDIATE R&B

## 2024-02-05 RX ADMIN — IPRATROPIUM BROMIDE 2 PUFF: 17 AEROSOL, METERED RESPIRATORY (INHALATION) at 07:50

## 2024-02-05 RX ADMIN — CEFTRIAXONE 1000 MG: 1 INJECTION, POWDER, FOR SOLUTION INTRAMUSCULAR; INTRAVENOUS at 18:04

## 2024-02-05 RX ADMIN — IPRATROPIUM BROMIDE 2 PUFF: 17 AEROSOL, METERED RESPIRATORY (INHALATION) at 15:22

## 2024-02-05 RX ADMIN — LOSARTAN POTASSIUM 25 MG: 25 TABLET, FILM COATED ORAL at 08:58

## 2024-02-05 RX ADMIN — ENOXAPARIN SODIUM 40 MG: 100 INJECTION SUBCUTANEOUS at 17:58

## 2024-02-05 RX ADMIN — ALBUTEROL SULFATE 2 PUFF: 90 AEROSOL, METERED RESPIRATORY (INHALATION) at 15:23

## 2024-02-05 RX ADMIN — DEXAMETHASONE 6 MG: 4 TABLET ORAL at 09:04

## 2024-02-05 RX ADMIN — INSULIN LISPRO 3 UNITS: 100 INJECTION, SOLUTION INTRAVENOUS; SUBCUTANEOUS at 08:58

## 2024-02-05 RX ADMIN — IPRATROPIUM BROMIDE 2 PUFF: 17 AEROSOL, METERED RESPIRATORY (INHALATION) at 11:19

## 2024-02-05 RX ADMIN — INSULIN GLARGINE 10 UNITS: 100 INJECTION, SOLUTION SUBCUTANEOUS at 21:38

## 2024-02-05 RX ADMIN — INSULIN LISPRO 3 UNITS: 100 INJECTION, SOLUTION INTRAVENOUS; SUBCUTANEOUS at 17:59

## 2024-02-05 RX ADMIN — ALBUTEROL SULFATE 2 PUFF: 90 AEROSOL, METERED RESPIRATORY (INHALATION) at 07:43

## 2024-02-05 RX ADMIN — GUAIFENESIN 600 MG: 600 TABLET, EXTENDED RELEASE ORAL at 21:38

## 2024-02-05 RX ADMIN — ALBUTEROL SULFATE 2 PUFF: 90 AEROSOL, METERED RESPIRATORY (INHALATION) at 11:18

## 2024-02-05 RX ADMIN — SODIUM CHLORIDE, PRESERVATIVE FREE 10 ML: 5 INJECTION INTRAVENOUS at 21:00

## 2024-02-05 RX ADMIN — GUAIFENESIN 600 MG: 600 TABLET, EXTENDED RELEASE ORAL at 08:58

## 2024-02-05 RX ADMIN — SODIUM CHLORIDE, PRESERVATIVE FREE 10 ML: 5 INJECTION INTRAVENOUS at 09:04

## 2024-02-05 RX ADMIN — INSULIN LISPRO 4 UNITS: 100 INJECTION, SOLUTION INTRAVENOUS; SUBCUTANEOUS at 21:34

## 2024-02-05 RX ADMIN — AZITHROMYCIN DIHYDRATE 250 MG: 250 TABLET ORAL at 08:58

## 2024-02-05 RX ADMIN — IPRATROPIUM BROMIDE 2 PUFF: 17 AEROSOL, METERED RESPIRATORY (INHALATION) at 21:13

## 2024-02-05 RX ADMIN — INSULIN LISPRO 2 UNITS: 100 INJECTION, SOLUTION INTRAVENOUS; SUBCUTANEOUS at 17:59

## 2024-02-05 NOTE — CONSULTS
[place] [id number] [place] [id number]                                  CONSULTATION    PATIENT NAME: ALDA POSADAS               :        1960  MED REC NO:   [id number]                          ROOM:  ACCOUNT NO:   [id number]                           ADMIT DATE: 2024  PROVIDER:     Lenny Dexter MD    CONSULT DATE:  2024    REASON FOR CONSULTATION/HISTORY:  The patient is a 63-year-old gentleman  with multiple medical problems including COPD; chronic respiratory  failure, on home oxygen; underlying interstitial lung disease; diabetes  who was admitted through the emergency room with complaints of  increasing shortness of breath, cough productive of whitish to yellow  phlegm.  He was complaining of chest tightness and wheezing.  He denied  any hemoptysis.  He denied any fever or chills.  He denied any nausea or  vomiting.  He was started on breathing treatments, antibiotics.  His  initial blood gases showed evidence of acute-on-chronic respiratory  failure with elevated pCO2.  He was placed on BiPAP and subsequently was  admitted to the hospital.    PAST MEDICAL HISTORY:  Significant for COPD; chronic respiratory  failure, on home oxygen; arthritis; interstitial lung disease;  hyperlipidemia; hypertension; restrictive lung disease.    PAST SURGICAL HISTORY:  Remarkable for colonoscopy, skin biopsy,  tonsillectomy.    FAMILY HISTORY:  Noncontributory.    SOCIAL HISTORY:  Reveals that he quit smoking, but has 23 pack-year  smoking history.  No history of alcohol or drug abuse.    MEDICATIONS:  His medications were reviewed.    ALLERGIES:  He has no known allergies.    REVIEW OF SYSTEMS:  10 to 14-point review of systems was reviewed and is  negative except for what is mentioned in history of present illness.    PHYSICAL EXAMINATION:  GENERAL:  The patient is alert, oriented x3, in no acute

## 2024-02-05 NOTE — CARE COORDINATION
CM in to see Pt to follow up on discharge planning.  Plan remains home.    Pt requesting [facility] referral for assistance with new meds.  Referral made

## 2024-02-06 VITALS
WEIGHT: 208 LBS | HEIGHT: 69 IN | HEART RATE: 101 BPM | TEMPERATURE: 97.9 F | DIASTOLIC BLOOD PRESSURE: 56 MMHG | RESPIRATION RATE: 22 BRPM | BODY MASS INDEX: 30.81 KG/M2 | SYSTOLIC BLOOD PRESSURE: 115 MMHG | OXYGEN SATURATION: 90 %

## 2024-02-06 LAB
ANION GAP SERPL CALCULATED.3IONS-SCNC: 5 MMOL/L (ref 7–16)
BASOPHILS ABSOLUTE: 0 K/CU MM
BASOPHILS RELATIVE PERCENT: 0.1 % (ref 0–1)
BUN SERPL-MCNC: 21 MG/DL (ref 6–23)
CALCIUM SERPL-MCNC: 8.4 MG/DL (ref 8.3–10.6)
CHLORIDE BLD-SCNC: 90 MMOL/L (ref 99–110)
CO2: 41 MMOL/L (ref 21–32)
CREAT SERPL-MCNC: 0.7 MG/DL (ref 0.9–1.3)
CULTURE: NORMAL
CULTURE: NORMAL
DIFFERENTIAL TYPE: ABNORMAL
EOSINOPHILS ABSOLUTE: 0.1 K/CU MM
EOSINOPHILS RELATIVE PERCENT: 0.6 % (ref 0–3)
GFR SERPL CREATININE-BSD FRML MDRD: >60 ML/MIN/1.73M2
GLUCOSE BLD-MCNC: 135 MG/DL (ref 70–99)
GLUCOSE BLD-MCNC: 294 MG/DL (ref 70–99)
GLUCOSE SERPL-MCNC: 150 MG/DL (ref 70–99)
HCT VFR BLD CALC: 51 % (ref 42–52)
HEMOGLOBIN: 16.4 GM/DL (ref 13.5–18)
IMMATURE NEUTROPHIL %: 0.3 % (ref 0–0.43)
LYMPHOCYTES ABSOLUTE: 1.8 K/CU MM
LYMPHOCYTES RELATIVE PERCENT: 17 % (ref 24–44)
Lab: NORMAL
Lab: NORMAL
MCH RBC QN AUTO: 27.1 PG (ref 27–31)
MCHC RBC AUTO-ENTMCNC: 32.2 % (ref 32–36)
MCV RBC AUTO: 84.3 FL (ref 78–100)
MONOCYTES ABSOLUTE: 1.4 K/CU MM
MONOCYTES RELATIVE PERCENT: 13 % (ref 0–4)
NUCLEATED RBC %: 0 %
PDW BLD-RTO: 14.8 % (ref 11.7–14.9)
PLATELET # BLD: 206 K/CU MM (ref 140–440)
PMV BLD AUTO: 9.6 FL (ref 7.5–11.1)
POTASSIUM SERPL-SCNC: 3.8 MMOL/L (ref 3.5–5.1)
RBC # BLD: 6.05 M/CU MM (ref 4.6–6.2)
SEGMENTED NEUTROPHILS ABSOLUTE COUNT: 7.4 K/CU MM
SEGMENTED NEUTROPHILS RELATIVE PERCENT: 69 % (ref 36–66)
SODIUM BLD-SCNC: 136 MMOL/L (ref 135–145)
SPECIMEN: NORMAL
SPECIMEN: NORMAL
TOTAL IMMATURE NEUTOROPHIL: 0.03 K/CU MM
TOTAL NUCLEATED RBC: 0 K/CU MM
WBC # BLD: 10.8 K/CU MM (ref 4–10.5)

## 2024-02-06 PROCEDURE — 94150 VITAL CAPACITY TEST: CPT

## 2024-02-06 PROCEDURE — 36415 COLL VENOUS BLD VENIPUNCTURE: CPT

## 2024-02-06 PROCEDURE — 80048 BASIC METABOLIC PNL TOTAL CA: CPT

## 2024-02-06 PROCEDURE — 2580000003 HC RX 258: Performed by: STUDENT IN AN ORGANIZED HEALTH CARE EDUCATION/TRAINING PROGRAM

## 2024-02-06 PROCEDURE — 85025 COMPLETE CBC W/AUTO DIFF WBC: CPT

## 2024-02-06 PROCEDURE — 6370000000 HC RX 637 (ALT 250 FOR IP): Performed by: STUDENT IN AN ORGANIZED HEALTH CARE EDUCATION/TRAINING PROGRAM

## 2024-02-06 PROCEDURE — 94640 AIRWAY INHALATION TREATMENT: CPT

## 2024-02-06 PROCEDURE — 6370000000 HC RX 637 (ALT 250 FOR IP): Performed by: INTERNAL MEDICINE

## 2024-02-06 PROCEDURE — 2700000000 HC OXYGEN THERAPY PER DAY

## 2024-02-06 PROCEDURE — 94761 N-INVAS EAR/PLS OXIMETRY MLT: CPT

## 2024-02-06 PROCEDURE — 82962 GLUCOSE BLOOD TEST: CPT

## 2024-02-06 RX ORDER — BLOOD SUGAR DIAGNOSTIC
STRIP MISCELLANEOUS
Qty: 100 STRIP | Refills: 5 | Status: SHIPPED | OUTPATIENT
Start: 2024-02-06 | End: 2024-02-06

## 2024-02-06 RX ORDER — BLOOD SUGAR DIAGNOSTIC
STRIP MISCELLANEOUS
Qty: 100 STRIP | Refills: 5 | Status: SHIPPED | OUTPATIENT
Start: 2024-02-06

## 2024-02-06 RX ADMIN — GUAIFENESIN 600 MG: 600 TABLET, EXTENDED RELEASE ORAL at 09:18

## 2024-02-06 RX ADMIN — IPRATROPIUM BROMIDE 2 PUFF: 17 AEROSOL, METERED RESPIRATORY (INHALATION) at 08:16

## 2024-02-06 RX ADMIN — LOSARTAN POTASSIUM 25 MG: 25 TABLET, FILM COATED ORAL at 09:18

## 2024-02-06 RX ADMIN — INSULIN LISPRO 3 UNITS: 100 INJECTION, SOLUTION INTRAVENOUS; SUBCUTANEOUS at 11:51

## 2024-02-06 RX ADMIN — ALBUTEROL SULFATE 2 PUFF: 90 AEROSOL, METERED RESPIRATORY (INHALATION) at 08:16

## 2024-02-06 RX ADMIN — INSULIN LISPRO 2 UNITS: 100 INJECTION, SOLUTION INTRAVENOUS; SUBCUTANEOUS at 11:51

## 2024-02-06 RX ADMIN — SODIUM CHLORIDE, PRESERVATIVE FREE 10 ML: 5 INJECTION INTRAVENOUS at 09:19

## 2024-02-06 ASSESSMENT — PAIN SCALES - GENERAL: PAINLEVEL_OUTOF10: 0

## 2024-02-06 NOTE — PLAN OF CARE
Problem: Discharge Planning  Goal: Discharge to home or other facility with appropriate resources  Outcome: Progressing     Problem: Pain  Goal: Verbalizes/displays adequate comfort level or baseline comfort level  Outcome: Progressing     Problem: Chronic Conditions and Co-morbidities  Goal: Patient's chronic conditions and co-morbidity symptoms are monitored and maintained or improved  Outcome: Progressing     Problem: Safety - Adult  Goal: Free from fall injury  Outcome: Progressing

## 2024-02-08 NOTE — PROGRESS NOTES
V2.0    [facility] [place]      Name:  Jeromy Bojorquez /Age/Sex: 1960  (63 y.o. male)   MRN & CSN:  [id number] & [id number] Encounter Date/Time: 2/3/2024 11:18 AM EST   Location:  [address] [place] [address]-A PCP: No primary care provider on file.     Attending:Yadi Ortiz MD       Hospital Day: 3    Assessment and Recommendations   Jeromy Bojorquez is a 63 y.o. male with pmh of  COPD, chronic respiratory failure on 2 L of nasal cannula, hypertension, type 2 diabetes mellitus and obesity  who presents with Acute respiratory failure with hypoxia and hypercapnia (HCC)      # Acute on chronic hypoxic/hypercapnic respiratory failure secondary to COPD exacerbation versus volume overload: Presented with worsening shortness of breath with increased productive cough, baseline oxygen 2 L, ABG showed PCO2 92/pO2 68, chest x-ray 1 view mild pulmonary vascular congestion, COVID and influenza negative, started on BiPAP initially, Solu-Medrol 125 mg continue, DuoNebs, started on ceftriaxone and azithromycin, incentive spirometry, obtain Pro-Jovon 0.059, IV Lasix, fluid and salt restriction, daily weight, ins/os, obtained echocardiogram which showed EF 60 to 65%, continue to monitor.     # Volume overload secondary to HFpEF: Presented with shortness of breath, BNP , chest x-ray pulmonary congestion, last echocardiogram  EF 50 to 55%, grade 1 diastolic dysfunction, mild LVH, started on IV Lasix discontinued, repeat echocardiogram showed EF 60 to 65% and patient improved, monitor daily weight, ins/os, fluid and salt restriction continue to monitor.     # Elevated troponin suspected secondary to demand: Presented with worsening shortness of breath, chest discomfort, EKG sinus tachycardia, troponin mildly elevated at 31 trended down to 28, consult cardiology continue to monitor.     # Hypertension: Continue losartan     # Type 2 diabetes mellitus: A1c 9.0% on 2024, on metformin and glimepiride, hold, started on 
   02/01/24 1502   NIV Type   $NIV $Daily Charge   NIV Started/Stopped On   Equipment Type v60   Mode Bilevel   Mask Type Full face mask   Mask Size Medium   Bonnet size Medium   Assessment   Pulse (!) 113   Heart Rate Source Monitor   /76   SpO2 96 %   Level of Consciousness 0   Comfort Level Good   Using Accessory Muscles Yes   Mask Compliance Good   Skin Assessment Clean, dry, & intact   Settings/Measurements   PIP Observed 16 cm H20   IPAP 15 cmH20   CPAP/EPAP 5 cmH2O   Vt (Measured) 567 mL   Rate Ordered 14   Insp Rise Time (%) 2 %   FiO2  50 %   I Time/ I Time % 1 s   Minute Volume (L/min) 13.2 Liters   Mask Leak (lpm) 52 lpm   CO2 92 mmhg   Patient's Home Machine No   Alarm Settings   Alarms On Y   Low Pressure (cmH2O) 5 cmH2O   High Pressure (cmH2O) 20 cmH2O   Delay Alarm 20 sec(s)   Apnea (secs) 20 secs   RR Low (bpm) 14   RR High (bpm) 40 br/min       
  Physician Progress Note      PATIENT:               ALDA POSADAS  [facility] #:                  [id number]  :                       1960  ADMIT DATE:       2024 1:17 PM  DISCH DATE:        2024 1:11 PM  RESPONDING  PROVIDER #:        Malina Kent MD          QUERY TEXT:    Pt admitted with acute respiratory failure and has CHF documented. If   possible, please document in progress notes and discharge summary further   specificity regarding the type and acuity of CHF:    The medical record reflects the following:  Risk Factors: DM, COPD  Clinical Indicators: DC summary \"Volume overload secondary to   HFpEF...Presented with shortness of breath, BNP , chest x-ray pulmonary   congestion, last echocardiogram  EF 50 to 55%, grade 1 diastolic   dysfunction, mild LVH, started on IV Lasix discontinued, repeat echocardiogram   showed EF 60 to 65% and patient improved, fluid and salt restriction continue   to monitor.\"  Treatment: IV Lasix, CXR, Pro-BNP, cardiology consult.    Thank you,  Yaneth CARTAGENA, RN, [facility] [phone]  Options provided:  -- Acute Diastolic CHF/HFpEF  -- Acute on Chronic Diastolic CHF/HFpEF  -- Other - I will add my own diagnosis  -- Disagree - Not applicable / Not valid  -- Disagree - Clinically unable to determine / Unknown  -- Refer to Clinical Documentation Reviewer    PROVIDER RESPONSE TEXT:    This patient is in acute diastolic CHF/HFpEF.    Query created by: Yaneth Barrow on 2024 6:57 AM      Electronically signed by:  Malina Kent MD 2024 7:45 AM          
Met with patient and introduced myself as the Heart failure education R.N.   Patient reports having SOB, orthopnea and abdominal bloating.   Reports living at home with his sister. He does most of the shopping and cooking. He manages his own medications.    Admitting diagnosis- Acute respiratory failure with hypoxia  Cardiologist- Ellen   Heart Failure Education Nurse consulted- yes   Ejection fraction 60-65 % as of 2/1/24  Pro BNP-2,070 on 2/1/24  Hospital follow up appt-  2/13 at the [facility]    Patient informed of appointment and appointment added to AVS  Smoking Cessation information and referral- former smoker  Pneumonia vaccine- overdue ? ARF this admission  PCP-   Patient has a digital scale? Yes   Transportation- states he has transportation    Able to obtain medications without difficulty?- Yes- Patient denies difficulty in obtaining or taking medications. Advised not to stop taking a medication without notifying provider.    Heart failure specific Medications-  Losartan Lasix, Potassium    Reviewed Heart failure patient education book with patient. Heart failure education included: Type of heart failure, How it is diagnosed, causes, symptoms, medications, diet, daily weights, exercise and fluid control. Recommendation for Mediterranean or DASH diet made.  Questions answered.      Patient's heart failure medications reviewed and information given on each.     Reviewed the Stop Light Handout with patient and instructed when to call his provider.   Patient was engaged and attentive during education session.    The following handouts were reviewed with patient and patient was given a copy of the following : Heart Failure education booklet, the 'Stop Light' Handout,  a link to the [employer]'s Healthier Living with Heart Failure Interactive workbook and a list of heart failure related education available on the hospital TV and how to access it.           
Per verbal order from Dr. Tayolr, patient is now on BiPAP 15/5/50%. Patient understands to pull the mask off if he needs to vomit    
Pulmonary and Critical Care  Progress Note    Subjective:   The patient has improved  Shortness of breath has improved  Chest pain none.  Addressing respiratory complaints Patient is negative for  hemoptysis and cyanosis  CONSTITUTIONAL:  negative for fevers and chills      Past Medical History:     has a past medical history of Acute exacerbation of chronic obstructive pulmonary disease (COPD) (HCC), Acute on chronic respiratory failure with hypoxemia (HCC), Arthritis, Cataract, Chronic hypoxemic respiratory failure (HCC), COPD (chronic obstructive pulmonary disease) (HCC), COPD, severe (HCC), Diabetes mellitus (HCC), Enlarged prostate, Former smoker, Hyperlipidemia, Hypertension, and Restrictive lung disease.   has a past surgical history that includes Colonoscopy; skin biopsy; and Tonsillectomy.   reports that he quit smoking about 3 years ago. His smoking use included cigarettes. He started smoking about 50 years ago. He has a 23.5 pack-year smoking history. He has never used smokeless tobacco. He reports that he does not drink alcohol and does not use drugs.  Family history:  family history is not on file.    No Known Allergies  Social History:    Reviewed; no changes    Objective:   PHYSICAL EXAM:        VITALS:  BP (!) 104/59   Pulse 94   Temp 98 °F (36.7 °C) (Oral)   Resp 20   Ht 1.753 m (5' 9\")   Wt 94.3 kg (208 lb)   SpO2 96%   BMI 30.72 kg/m²     24HR INTAKE/OUTPUT:    Intake/Output Summary (Last 24 hours) at 2/3/2024 1319  Last data filed at 2/3/2024 0408  Gross per 24 hour   Intake 300 ml   Output 1450 ml   Net -1150 ml       CONSTITUTIONAL:  awake, alert, cooperative, no apparent distress, and appears stated age  LUNGS:  decreased breath sounds, basilar crackles.  CARDIOVASCULAR:  normal S1 and S2 and negative JVD  ABD:Abdomen soft, non-tender. BS normal. No masses,  No organomegaly  NEURO:Alert and oriented x3. Gait normal. Reflexes and motor strength normal and symmetric. Cranial nerves 2-12 
Pulmonary and Critical Care  Progress Note    Subjective:   The patient has improved.On 4 L N/C.  Shortness of breath has improved  Chest pain none.  Addressing respiratory complaints Patient is negative for  hemoptysis and cyanosis  CONSTITUTIONAL:  negative for fevers and chills      Past Medical History:     has a past medical history of Acute exacerbation of chronic obstructive pulmonary disease (COPD) (HCC), Acute on chronic respiratory failure with hypoxemia (HCC), Arthritis, Cataract, Chronic hypoxemic respiratory failure (HCC), COPD (chronic obstructive pulmonary disease) (HCC), COPD, severe (HCC), Diabetes mellitus (HCC), Enlarged prostate, Former smoker, Hyperlipidemia, Hypertension, and Restrictive lung disease.   has a past surgical history that includes Colonoscopy; skin biopsy; and Tonsillectomy.   reports that he quit smoking about 3 years ago. His smoking use included cigarettes. He started smoking about 50 years ago. He has a 23.5 pack-year smoking history. He has never used smokeless tobacco. He reports that he does not drink alcohol and does not use drugs.  Family history:  family history is not on file.    No Known Allergies  Social History:    Reviewed; no changes    Objective:   PHYSICAL EXAM:        VITALS:  /63   Pulse 91   Temp 97.5 °F (36.4 °C) (Oral)   Resp 21   Ht 1.753 m (5' 9\")   Wt 94.3 kg (208 lb)   SpO2 92%   BMI 30.72 kg/m²     24HR INTAKE/OUTPUT:    Intake/Output Summary (Last 24 hours) at 2/5/2024 1045  Last data filed at 2/5/2024 0352  Gross per 24 hour   Intake --   Output 1800 ml   Net -1800 ml       CONSTITUTIONAL:  awake, alert, cooperative, no apparent distress, and appears stated age  LUNGS:  decreased breath sounds, basilar crackles.  CARDIOVASCULAR:  normal S1 and S2 and negative JVD  ABD:Abdomen soft, non-tender. BS normal. No masses,  No organomegaly  NEURO:Alert and oriented x3. Gait normal. Reflexes and motor strength normal and symmetric. Cranial nerves 
Pulmonary and Critical Care  Progress Note    Subjective:   The patient is better.On 2 L N/C.  Shortness of breath has improved  Chest pain none.  Addressing respiratory complaints Patient is negative for  hemoptysis and cyanosis  CONSTITUTIONAL:  negative for fevers and chills      Past Medical History:     has a past medical history of Acute exacerbation of chronic obstructive pulmonary disease (COPD) (HCC), Acute on chronic respiratory failure with hypoxemia (HCC), Arthritis, Cataract, Chronic hypoxemic respiratory failure (HCC), COPD (chronic obstructive pulmonary disease) (HCC), COPD, severe (HCC), Diabetes mellitus (HCC), Enlarged prostate, Former smoker, Hyperlipidemia, Hypertension, and Restrictive lung disease.   has a past surgical history that includes Colonoscopy; skin biopsy; and Tonsillectomy.   reports that he quit smoking about 3 years ago. His smoking use included cigarettes. He started smoking about 50 years ago. He has a 23.5 pack-year smoking history. He has never used smokeless tobacco. He reports that he does not drink alcohol and does not use drugs.  Family history:  family history is not on file.    No Known Allergies  Social History:    Reviewed; no changes    Objective:   PHYSICAL EXAM:        VITALS:  BP (!) 115/56   Pulse (!) 101   Temp 97.9 °F (36.6 °C) (Oral)   Resp 22   Ht 1.753 m (5' 9\")   Wt 94.3 kg (208 lb)   SpO2 90%   BMI 30.72 kg/m²     24HR INTAKE/OUTPUT:  No intake or output data in the 24 hours ending 02/06/24 1130      CONSTITUTIONAL:  awake, alert, cooperative, no apparent distress, and appears stated age  LUNGS:  decreased breath sounds, basilar crackles.  CARDIOVASCULAR:  normal S1 and S2 and negative JVD  ABD:Abdomen soft, non-tender. BS normal. No masses,  No organomegaly  NEURO:Alert and oriented x3. Gait normal. Reflexes and motor strength normal and symmetric. Cranial nerves 2-12 and sensation grossly intact.  DATA:    CBC:  Recent Labs     02/04/24  0450 
Spoke with Leona CHERRY notification of SBAR in place.  
nerves 2-12 and sensation grossly intact.  DATA:    CBC:  Recent Labs     02/02/24  0305 02/02/24 2358 02/04/24  0450   WBC 6.9 10.8* 12.6*   RBC 6.03 5.91 6.08   HGB 16.4 15.9 16.3   HCT 53.0* 51.2 52.4*    190 203   MCV 87.9 86.6 86.2   MCH 27.2 26.9* 26.8*   MCHC 30.9* 31.1* 31.1*   RDW 15.5* 15.1* 14.9   SEGSPCT 81.3* 90.1* 77.9*      BMP:  Recent Labs     02/02/24 0305 02/02/24 2358 02/04/24  0450    134* 136   K 4.7 4.2 4.7   CL 93* 87* 88*   CO2 34* 39* 43*   BUN 23 29* 26*   CREATININE 0.8* 0.7* 0.7*   CALCIUM 8.7 8.5 9.0   GLUCOSE 273* 272* 219*      ABG:  Recent Labs     02/01/24  1800   PH 7.42   PO2ART 42*   LDY4VXD 52.0*   O2SAT 77.4*     Lab Results   Component Value Date    PROBNP 212.2 02/02/2024    PROBNP 2,070 (H) 02/01/2024    PROBNP 206.6 01/08/2024     No results found for: \"CULTRESP\"    Radiology Review:  Pertinent images / reports were reviewed as a part of this visit.    Assessment:     Patient Active Problem List   Diagnosis    Shortness of breath    Continuous dependence on cigarette smoking    Secondary polycythemia    Testicular swelling, left    Uncontrolled type 2 diabetes mellitus with hyperglycemia (HCC)    Chronic obstructive pulmonary disease (HCC)    Former smoker    Controlled type 2 diabetes mellitus without complication, without long-term current use of insulin (HCC)    Callus    Hypoxia    Epistaxis    Acute exacerbation of chronic obstructive pulmonary disease (COPD) (HCC)    Restrictive lung disease    Lung nodule    COPD exacerbation (HCC)    Chronic respiratory failure with hypoxia and hypercapnia (HCC)    Acute respiratory failure with hypoxia and hypercapnia (HCC)       Plan:   1. Overall the patient has improved.  2. Wean FiO2.  3. Inc. Activity.    Lenny Dexter MD   2/4/2024  2:18 PM  
02/01/24  1327 02/02/24  0305 02/02/24  2358   WBC 10.8* 6.9 10.8*   HGB 17.2 16.4 15.9   HCT 56.5* 53.0* 51.2   MCV 87.7 87.9 86.6    195 190     BMP:   Recent Labs     02/01/24  1327 02/02/24  0305 02/02/24  2358    138 134*   K 4.6 4.7 4.2   CL 90* 93* 87*   CO2 40* 34* 39*   PHOS  --   --  3.0   BUN 22 23 29*   CREATININE 0.7* 0.8* 0.7*     PT/INR:   Recent Labs     02/01/24 1327   PROTIME 14.5   INR 1.1     BNP:    Recent Labs     02/01/24 1327 02/02/24  2358   PROBNP 2,070* 212.2     TROPONIN: No results for input(s): \"TROPONINT\" in the last 72 hours.     Impression:  Principal Problem:    Acute respiratory failure with hypoxia and hypercapnia (HCC)  Resolved Problems:    * No resolved hospital problems. *             All labs, medications and tests reviewed by myself, continue all other medications of all above medical condition listed as is except for changes mentioned above.    Thank you   Please call with questions.    Electronically signed by MIGUEL Davis CNP on 2/3/2024 at 3:28 PM          CARDIOLOGY ATTENDING ADDENDUM    I have seen, spoken to and examined this patient personally, independent of the NP/PAC. I have reviewed the hospital care given to date and reviewed all pertinent labs and imaging. I have spoken with patient, nursing staff and provided written and verbal instructions .The above note has been reviewed. I have spent substantive (>50%) amount of time in formulating patient care.               Physical Exam:       Head: normal  Eye: normal  Chest:  coarse  Cardiovascular:  S1S2    Abdomen: soft   Extremities:    0 edema  Pulses :   palpable      MEDICAL DECISION MAKING :     Heart failure with preserved ejection fraction  COPD exacerbation  COVID-19 infection  Essential hypertension  Diabetes mellitus    Negative fluid balance, currently off diuretics  IV antibiotics as per primary team/pulmonary medicine  Continue with losartan 25 mg p.o. daily  Risk factor 
Exam:      General: In no distress on nasal cannula 4 L  Eyes: EOMI  ENT: neck supple  Cardiovascular: S1 S2 normal   Respiratory: Bilateral basal crepitations  Gastrointestinal: Soft, non tender bowel sounds normal  Genitourinary: no suprapubic tenderness  Musculoskeletal: No edema  Skin: warm, dry  Neuro: Alert.  Psych: Mood appropriate.        Medications:   Medications:    dexAMETHasone  6 mg Oral Daily    guaiFENesin  600 mg Oral BID    albuterol sulfate HFA  2 puff Inhalation Q4H WA RT    ipratropium  2 puff Inhalation Q4H WA RT    losartan  25 mg Oral Daily    sodium chloride flush  5-40 mL IntraVENous 2 times per day    enoxaparin  40 mg SubCUTAneous QPM    cefTRIAXone (ROCEPHIN) IV  1,000 mg IntraVENous Q24H    azithromycin  250 mg Oral Daily    insulin glargine  10 Units SubCUTAneous Nightly    insulin lispro  0-4 Units SubCUTAneous TID WC    insulin lispro  0-4 Units SubCUTAneous Nightly    insulin lispro  3 Units SubCUTAneous TID WC      Infusions:    sodium chloride       PRN Meds: albuterol sulfate HFA, 2 puff, Q6H PRN  sodium chloride flush, 5-40 mL, PRN  sodium chloride, , PRN  potassium chloride, 40 mEq, PRN   Or  potassium alternative oral replacement, 40 mEq, PRN   Or  potassium chloride, 10 mEq, PRN  magnesium sulfate, 2,000 mg, PRN  ondansetron, 4 mg, Q8H PRN   Or  ondansetron, 4 mg, Q6H PRN  polyethylene glycol, 17 g, Daily PRN  acetaminophen, 650 mg, Q6H PRN   Or  acetaminophen, 650 mg, Q6H PRN        Labs and Imaging   Echo (TTE) complete (PRN contrast/bubble/strain/3D)    Result Date: 2/2/2024    Definity was utilized.   Left Ventricle: Normal left ventricular systolic function with a visually estimated EF of 60 - 65%.   Mitral Valve: Annular calcification of the mitral valve.   Right Ventricle: Right ventricle is moderately dilated. Right ventricle free wall appears hypokinetic.   Pericardium: No pericardial effusion.   Aortic Valve: Sclerosis of the aortic valve cusps.     XR CHEST 
Valve: Sclerosis of the aortic valve cusps.     XR CHEST PORTABLE    Result Date: 2/1/2024  EXAMINATION: ONE XRAY VIEW OF THE CHEST 2/1/2024 3:56 pm COMPARISON: 01/06/2024 HISTORY: ORDERING SYSTEM PROVIDED HISTORY: cough.  SOB.  wheezing.  exposed to Flu TECHNOLOGIST PROVIDED HISTORY: Reason for exam:->cough.  SOB.  wheezing.  exposed to Flu Reason for Exam: cough.  SOB.  wheezing.  exposed to Flu Additional signs and symptoms: NA Relevant Medical/Surgical History: NA FINDINGS: Heart size stable.  Elevated right hemidiaphragm again noted.  No new airspace disease.  No pneumothorax.  Mild pulmonary vascular congestion.     Unchanged elevated right hemidiaphragm Mild pulmonary vascular congestion       CBC:   Recent Labs     02/02/24 2358 02/04/24  0450   WBC 10.8* 12.6*   HGB 15.9 16.3    203       BMP:    Recent Labs     02/02/24 2358 02/04/24  0450   * 136   K 4.2 4.7   CL 87* 88*   CO2 39* 43*   BUN 29* 26*   CREATININE 0.7* 0.7*   GLUCOSE 272* 219*       Hepatic:   Recent Labs     02/02/24 2358   AST 23   ALT 13   BILITOT 0.5   ALKPHOS 57       Lipids:   Lab Results   Component Value Date/Time    CHOL 151 01/17/2022 11:04 AM    HDL 43 01/17/2022 11:04 AM    TRIG 107 01/17/2022 11:04 AM     Hemoglobin A1C:   Lab Results   Component Value Date/Time    LABA1C 9.0 01/07/2024 10:34 AM     TSH: No results found for: \"TSH\"  Troponin:   Lab Results   Component Value Date/Time    TROPONINT 0.018 07/03/2018 04:29 PM    TROPONINT 0.030 07/03/2018 12:36 PM    TROPONINT 0.027 07/02/2018 07:53 PM     Lactic Acid: No results for input(s): \"LACTA\" in the last 72 hours.  BNP:   Recent Labs     02/02/24 2358   PROBNP 212.2       UA:  Lab Results   Component Value Date/Time    NITRU NEGATIVE 02/01/2024 08:54 PM    COLORU YELLOW 02/01/2024 08:54 PM    WBCUA 1 02/01/2024 08:54 PM    RBCUA 2 02/01/2024 08:54 PM    MUCUS FEW 02/01/2024 08:54 PM    TRICHOMONAS NONE SEEN 02/01/2024 08:54 PM    BACTERIA NEGATIVE 
02/01/2024 08:54 PM    BACTERIA NEGATIVE 02/01/2024 08:54 PM    CLARITYU CLEAR 02/01/2024 08:54 PM    SPECGRAV >1.030 02/01/2024 08:54 PM    LEUKOCYTESUR NEGATIVE 02/01/2024 08:54 PM    UROBILINOGEN 1.0 02/01/2024 08:54 PM    BILIRUBINUR MODERATE NUMBER OR AMOUNT OBSERVED 02/01/2024 08:54 PM    BLOODU NEGATIVE 02/01/2024 08:54 PM    KETUA 15 02/01/2024 08:54 PM     Urine Cultures: No results found for: \"LABURIN\"  Blood Cultures: No results found for: \"BC\"  No results found for: \"BLOODCULT2\"  Organism: No results found for: \"ORG\"      Electronically signed by Yadi Ortiz MD on 2/2/2024 at 11:18 AM

## 2024-02-13 ENCOUNTER — OFFICE VISIT (OUTPATIENT)
Dept: CARDIOLOGY CLINIC | Age: 64
End: 2024-02-13
Payer: MEDICARE

## 2024-02-13 VITALS
WEIGHT: 223 LBS | DIASTOLIC BLOOD PRESSURE: 56 MMHG | BODY MASS INDEX: 31.92 KG/M2 | HEART RATE: 97 BPM | HEIGHT: 70 IN | SYSTOLIC BLOOD PRESSURE: 122 MMHG

## 2024-02-13 DIAGNOSIS — I10 PRIMARY HYPERTENSION: ICD-10-CM

## 2024-02-13 DIAGNOSIS — R06.02 SHORTNESS OF BREATH: Primary | ICD-10-CM

## 2024-02-13 DIAGNOSIS — I50.32 CHRONIC DIASTOLIC HEART FAILURE (HCC): ICD-10-CM

## 2024-02-13 DIAGNOSIS — Z09 HOSPITAL DISCHARGE FOLLOW-UP: ICD-10-CM

## 2024-02-13 PROBLEM — I50.30 DIASTOLIC HEART FAILURE (HCC): Status: ACTIVE | Noted: 2024-02-13

## 2024-02-13 PROCEDURE — G8484 FLU IMMUNIZE NO ADMIN: HCPCS | Performed by: NURSE PRACTITIONER

## 2024-02-13 PROCEDURE — 3074F SYST BP LT 130 MM HG: CPT | Performed by: NURSE PRACTITIONER

## 2024-02-13 PROCEDURE — 1111F DSCHRG MED/CURRENT MED MERGE: CPT | Performed by: NURSE PRACTITIONER

## 2024-02-13 PROCEDURE — 99214 OFFICE O/P EST MOD 30 MIN: CPT | Performed by: NURSE PRACTITIONER

## 2024-02-13 PROCEDURE — 1036F TOBACCO NON-USER: CPT | Performed by: NURSE PRACTITIONER

## 2024-02-13 PROCEDURE — G8427 DOCREV CUR MEDS BY ELIG CLIN: HCPCS | Performed by: NURSE PRACTITIONER

## 2024-02-13 PROCEDURE — 3017F COLORECTAL CA SCREEN DOC REV: CPT | Performed by: NURSE PRACTITIONER

## 2024-02-13 PROCEDURE — 3078F DIAST BP <80 MM HG: CPT | Performed by: NURSE PRACTITIONER

## 2024-02-13 PROCEDURE — G8417 CALC BMI ABV UP PARAM F/U: HCPCS | Performed by: NURSE PRACTITIONER

## 2024-02-13 NOTE — ASSESSMENT & PLAN NOTE
EF 60-65%.  Noted to have mildly dilated right ventricle.  Patient was placed on losartan during hospitalization but has not been taking medication.  BP remains controlled.

## 2024-02-13 NOTE — PATIENT INSTRUCTIONS
Please be informed that if you contact our office outside of normal business hours the physician on call cannot help with any scheduling or rescheduling issues, procedure instruction questions or any type of medication issue.    We advise you for any urgent/emergency that you go to the nearest emergency room!    PLEASE CALL OUR OFFICE DURING NORMAL BUSINESS HOURS    Monday - Friday   8 am to 5 pm    [place]: [phone]    [place]: [phone]    [place]:  [phone]    **It is YOUR responsibilty to bring medication bottles and/or updated medication list to EACH APPOINTMENT. This will allow us to better serve you and all your healthcare needs**    Thank you for allowing us to care for you today!   We want to ensure we can follow your treatment plan and we strive to give you the best outcomes and experience possible.   If you ever have a life threatening emergency and call 911 - for an ambulance (EMS)   Our providers can only care for you at:   [facility] or [facility].   Even if you have someone take you or you drive yourself we can only care for you in a [facility] facility. Our providers are not setup at the other healthcare locations!     We are committed to providing you the best care possible.    If you receive a survey after visiting one of our offices, please take time to share your experience concerning your physician office visit.  These surveys are confidential and no health information about you is shared.    We are eager to improve for you and we are counting on your feedback to help make that happen.

## 2024-02-13 NOTE — PROGRESS NOTES
[place] [address]  Phone: [phone]    Fax [phone]    Lori Menard MD, [facility]  Deng Reinoso MD, [facility]   Arleth Kwon MD, [facility] MD Harshil Sheth MD, [facility]  Mohan Cheung MD, [facility]    Shayy Hughes MD, [facility]  Ambrocio Centeno MD, [facility]  Bri Julio, APRN  Silvia Sifuentes, APRN  Marci Irwin, APRN  Boris Palumbo, APRN        Cardiology Progress Note      2/13/2024    RE: Jeromy Bojorquez  (1960)                             Primary cardiologist: Dr. Hughes       Subjective:  CC:   1. Shortness of breath    2. Chronic diastolic heart failure (HCC)    3. Primary hypertension        HPI: Jeromy Bojorquez, who is a  63 y.o. year old male with a past medical history as listed below.  Patient presents to the office for follow up on SOB, HTN, and hyperlipidemia.  Patient recently hospitalized for respiratory failure acute exacerbation of COPD, COVID-19 infection, and CHF.  Patient was treated with aggressive diuretics and antibiotics.  Echocardiogram showed preserved LVEF with moderately dilated RV.  Last stress test was in 2018 which did not show any ischemia. Patient has been on continuous oxygen for the last 2 years.    Past Medical History:   Diagnosis Date    Acute exacerbation of chronic obstructive pulmonary disease (COPD) (HCC) 3/23/2023    Acute on chronic respiratory failure with hypoxemia (HCC) 1/17/2022    Arthritis     Cataract     Chronic hypoxemic respiratory failure (HCC) 6/30/2022    COPD (chronic obstructive pulmonary disease) (HCC)     COPD, severe (HCC) 1/17/2022    Diabetes mellitus (HCC)     Enlarged prostate     Former smoker 1/17/2022    Hyperlipidemia     Hypertension     Restrictive lung disease 3/23/2023       Current Outpatient Medications   Medication Sig Dispense Refill    losartan (COZAAR) 25 MG tablet TAKE 1 TABLET BY MOUTH EVERY DAY (Patient taking differently: Take 1 tablet by mouth daily)

## 2024-02-21 ENCOUNTER — CLINICAL DOCUMENTATION (OUTPATIENT)
Dept: CARDIOLOGY CLINIC | Age: 64
End: 2024-02-21

## 2024-02-21 NOTE — PROGRESS NOTES
Pt arrived for his scheduled NucMed.Stress Test this am. However unable to do his stress test d/t his SOB & is unable to lay flat under the gamma ray camera. He also is on continuous O2 as well. Notified Velia CHANin precert.concerning above also. Routed this encounter note to triage to inform  & see if there is anything else he would like ordered on this pt.

## 2024-03-01 ENCOUNTER — OFFICE VISIT (OUTPATIENT)
Dept: PULMONOLOGY | Age: 64
End: 2024-03-01
Payer: MEDICARE

## 2024-03-01 VITALS
OXYGEN SATURATION: 91 % | BODY MASS INDEX: 32.35 KG/M2 | WEIGHT: 226 LBS | RESPIRATION RATE: 16 BRPM | HEIGHT: 70 IN | HEART RATE: 96 BPM

## 2024-03-01 DIAGNOSIS — J42 CHRONIC BRONCHITIS, UNSPECIFIED CHRONIC BRONCHITIS TYPE (HCC): Primary | ICD-10-CM

## 2024-03-01 DIAGNOSIS — G47.10 HYPERSOMNIA: ICD-10-CM

## 2024-03-01 DIAGNOSIS — Z87.891 FORMER SMOKER: ICD-10-CM

## 2024-03-01 DIAGNOSIS — G47.33 OSA (OBSTRUCTIVE SLEEP APNEA): ICD-10-CM

## 2024-03-01 DIAGNOSIS — E66.9 OBESITY (BMI 30-39.9): ICD-10-CM

## 2024-03-01 PROCEDURE — 3023F SPIROM DOC REV: CPT | Performed by: INTERNAL MEDICINE

## 2024-03-01 PROCEDURE — 1111F DSCHRG MED/CURRENT MED MERGE: CPT | Performed by: INTERNAL MEDICINE

## 2024-03-01 PROCEDURE — 99215 OFFICE O/P EST HI 40 MIN: CPT | Performed by: INTERNAL MEDICINE

## 2024-03-01 PROCEDURE — G8417 CALC BMI ABV UP PARAM F/U: HCPCS | Performed by: INTERNAL MEDICINE

## 2024-03-01 PROCEDURE — G8484 FLU IMMUNIZE NO ADMIN: HCPCS | Performed by: INTERNAL MEDICINE

## 2024-03-01 PROCEDURE — 1036F TOBACCO NON-USER: CPT | Performed by: INTERNAL MEDICINE

## 2024-03-01 PROCEDURE — G8427 DOCREV CUR MEDS BY ELIG CLIN: HCPCS | Performed by: INTERNAL MEDICINE

## 2024-03-01 PROCEDURE — 3017F COLORECTAL CA SCREEN DOC REV: CPT | Performed by: INTERNAL MEDICINE

## 2024-03-01 ASSESSMENT — ENCOUNTER SYMPTOMS
BACK PAIN: 0
ABDOMINAL DISTENTION: 0
COUGH: 0
EYE ITCHING: 0
ABDOMINAL PAIN: 0
SHORTNESS OF BREATH: 1
EYE DISCHARGE: 0

## 2024-03-01 NOTE — ASSESSMENT & PLAN NOTE
He has symptoms of ANIVAL  Advised to go for the PSG  Loose weight  Sleep Hygiene measures  Driving precautions  Medical consequences of untreated ANIVAL

## 2024-03-01 NOTE — ASSESSMENT & PLAN NOTE
C/w quitting smoking  C.w Inhalers  C/w oxygen  PFT  6 MWT and  Low Dose CT chest in Oct'24  Get yearly flu vaccine

## 2024-03-01 NOTE — PROGRESS NOTES
47.1 years (23.5 ttl pk-yrs)     Types: Cigarettes     Start date: 12/8/1973     Quit date: 1/1/2021     Years since quitting: 3.1    Smokeless tobacco: Never   Vaping Use    Vaping Use: Never used   Substance and Sexual Activity    Alcohol use: No     Comment: caffeine: 4 coke a day    Drug use: No     Social Determinants of Health     Financial Resource Strain: Medium Risk (12/21/2022)    Overall Financial Resource Strain (CARDIA)     Difficulty of Paying Living Expenses: Somewhat hard   Food Insecurity: No Food Insecurity (2/2/2024)    Hunger Vital Sign     Worried About Running Out of Food in the Last Year: Never true     Ran Out of Food in the Last Year: Never true   Transportation Needs: Unmet Transportation Needs (2/2/2024)    PRAPARE - Transportation     Lack of Transportation (Medical): Yes     Lack of Transportation (Non-Medical): Yes   Physical Activity: Unknown (1/23/2023)    Exercise Vital Sign     Days of Exercise per Week: 4 days   Stress: No Stress Concern Present (12/21/2022)    [place] [employer] - Occupational Stress Questionnaire     Feeling of Stress : Only a little   Social Connections: Unknown (12/21/2022)    Social Connection and Isolation Panel [NHANES]     Frequency of Communication with Friends and Family: Twice a week     Frequency of Social Gatherings with Friends and Family: Once a week     Attends Mu-ism Services: Never     Active Member of Clubs or Organizations: No     Attends Club or Organization Meetings: Never   Housing Stability: Low Risk  (2/2/2024)    Housing Stability Vital Sign     Unable to Pay for Housing in the Last Year: No     Number of Places Lived in the Last Year: 1     Unstable Housing in the Last Year: No       Review of Systems   Constitutional:  Positive for fatigue.   HENT:  Negative for congestion and postnasal drip.    Eyes:  Negative for discharge and itching.   Respiratory:  Positive for shortness of breath. Negative for cough.

## 2024-03-12 ENCOUNTER — HOSPITAL ENCOUNTER (INPATIENT)
Age: 64
LOS: 7 days | Discharge: HOME | DRG: 177 | End: 2024-03-20
Attending: EMERGENCY MEDICINE | Admitting: INTERNAL MEDICINE
Payer: MEDICARE

## 2024-03-12 ENCOUNTER — APPOINTMENT (OUTPATIENT)
Dept: CT IMAGING | Age: 64
DRG: 177 | End: 2024-03-12
Payer: MEDICARE

## 2024-03-12 DIAGNOSIS — A41.9 SEPSIS DUE TO PNEUMONIA (HCC): ICD-10-CM

## 2024-03-12 DIAGNOSIS — J96.01 ACUTE RESPIRATORY FAILURE WITH HYPOXIA AND HYPERCAPNIA (HCC): Primary | ICD-10-CM

## 2024-03-12 DIAGNOSIS — J96.02 ACUTE RESPIRATORY FAILURE WITH HYPOXIA AND HYPERCAPNIA (HCC): Primary | ICD-10-CM

## 2024-03-12 DIAGNOSIS — J18.9 SEPSIS DUE TO PNEUMONIA (HCC): ICD-10-CM

## 2024-03-12 PROCEDURE — 82805 BLOOD GASES W/O2 SATURATION: CPT

## 2024-03-12 PROCEDURE — 99285 EMERGENCY DEPT VISIT HI MDM: CPT

## 2024-03-12 PROCEDURE — 2700000000 HC OXYGEN THERAPY PER DAY

## 2024-03-12 PROCEDURE — 94761 N-INVAS EAR/PLS OXIMETRY MLT: CPT

## 2024-03-12 RX ORDER — SODIUM CHLORIDE 0.9 % (FLUSH) 0.9 %
5-40 SYRINGE (ML) INJECTION 2 TIMES DAILY
Status: DISCONTINUED | OUTPATIENT
Start: 2024-03-13 | End: 2024-03-18 | Stop reason: SDUPTHER

## 2024-03-12 ASSESSMENT — PAIN - FUNCTIONAL ASSESSMENT: PAIN_FUNCTIONAL_ASSESSMENT: NONE - DENIES PAIN

## 2024-03-13 ENCOUNTER — APPOINTMENT (OUTPATIENT)
Dept: CT IMAGING | Age: 64
DRG: 177 | End: 2024-03-13
Payer: MEDICARE

## 2024-03-13 ENCOUNTER — APPOINTMENT (OUTPATIENT)
Dept: GENERAL RADIOLOGY | Age: 64
DRG: 177 | End: 2024-03-13
Payer: MEDICARE

## 2024-03-13 PROBLEM — J96.22 ACUTE ON CHRONIC RESPIRATORY FAILURE WITH HYPOXIA AND HYPERCAPNIA (HCC): Status: ACTIVE | Noted: 2024-03-13

## 2024-03-13 PROBLEM — J96.21 ACUTE ON CHRONIC RESPIRATORY FAILURE WITH HYPOXIA AND HYPERCAPNIA (HCC): Status: ACTIVE | Noted: 2024-03-13

## 2024-03-13 LAB
ALBUMIN SERPL-MCNC: 3.6 GM/DL (ref 3.4–5)
ALBUMIN SERPL-MCNC: 3.6 GM/DL (ref 3.4–5)
ALP BLD-CCNC: 77 IU/L (ref 40–129)
ALP BLD-CCNC: 86 IU/L (ref 40–129)
ALT SERPL-CCNC: 10 U/L (ref 10–40)
ALT SERPL-CCNC: 9 U/L (ref 10–40)
ANION GAP SERPL CALCULATED.3IONS-SCNC: 10 MMOL/L (ref 7–16)
ANION GAP SERPL CALCULATED.3IONS-SCNC: 7 MMOL/L (ref 7–16)
ANION GAP SERPL CALCULATED.3IONS-SCNC: 7 MMOL/L (ref 7–16)
AST SERPL-CCNC: 10 IU/L (ref 15–37)
AST SERPL-CCNC: 12 IU/L (ref 15–37)
B PARAP IS1001 DNA NPH QL NAA+NON-PROBE: NOT DETECTED
B PERT.PT PRMT NPH QL NAA+NON-PROBE: NOT DETECTED
BASE EXCESS MIXED: 3.9 (ref 0–3)
BASE EXCESS MIXED: 8.1 (ref 0–3)
BASOPHILS ABSOLUTE: 0.1 K/CU MM
BASOPHILS ABSOLUTE: 0.1 K/CU MM
BASOPHILS RELATIVE PERCENT: 0.6 % (ref 0–1)
BASOPHILS RELATIVE PERCENT: 1 % (ref 0–1)
BILIRUB SERPL-MCNC: 0.5 MG/DL (ref 0–1)
BILIRUB SERPL-MCNC: 0.6 MG/DL (ref 0–1)
BUN SERPL-MCNC: 10 MG/DL (ref 6–23)
BUN SERPL-MCNC: 10 MG/DL (ref 6–23)
BUN SERPL-MCNC: 11 MG/DL (ref 6–23)
C PNEUM DNA NPH QL NAA+NON-PROBE: NOT DETECTED
CALCIUM SERPL-MCNC: 8.2 MG/DL (ref 8.3–10.6)
CALCIUM SERPL-MCNC: 8.5 MG/DL (ref 8.3–10.6)
CALCIUM SERPL-MCNC: 8.7 MG/DL (ref 8.3–10.6)
CARBON MONOXIDE, BLOOD: 1.8 % (ref 0–5)
CHLORIDE BLD-SCNC: 93 MMOL/L (ref 99–110)
CHLORIDE BLD-SCNC: 94 MMOL/L (ref 99–110)
CHLORIDE BLD-SCNC: 98 MMOL/L (ref 99–110)
CO2 CONTENT: 35 MMOL/L (ref 21–32)
CO2: 31 MMOL/L (ref 21–32)
CO2: 31 MMOL/L (ref 21–32)
CO2: 34 MMOL/L (ref 21–32)
COMMENT: ABNORMAL
CREAT SERPL-MCNC: 0.6 MG/DL (ref 0.9–1.3)
CREAT SERPL-MCNC: 0.7 MG/DL (ref 0.9–1.3)
CREAT SERPL-MCNC: 0.8 MG/DL (ref 0.9–1.3)
DIFFERENTIAL TYPE: ABNORMAL
DIFFERENTIAL TYPE: ABNORMAL
EOSINOPHILS ABSOLUTE: 0 K/CU MM
EOSINOPHILS ABSOLUTE: 0.1 K/CU MM
EOSINOPHILS RELATIVE PERCENT: 0 % (ref 0–3)
EOSINOPHILS RELATIVE PERCENT: 1.7 % (ref 0–3)
FLUAV H1 2009 PAN RNA NPH NAA+NON-PROBE: NOT DETECTED
FLUAV H1 RNA NPH QL NAA+NON-PROBE: NOT DETECTED
FLUAV H3 RNA NPH QL NAA+NON-PROBE: NOT DETECTED
FLUAV RNA NPH QL NAA+NON-PROBE: NOT DETECTED
FLUBV RNA NPH QL NAA+NON-PROBE: NOT DETECTED
GFR SERPL CREATININE-BSD FRML MDRD: >60 ML/MIN/1.73M2
GLUCOSE BLD-MCNC: 215 MG/DL (ref 70–99)
GLUCOSE BLD-MCNC: 223 MG/DL (ref 70–99)
GLUCOSE BLD-MCNC: 247 MG/DL (ref 70–99)
GLUCOSE BLD-MCNC: 253 MG/DL (ref 70–99)
GLUCOSE BLD-MCNC: 262 MG/DL (ref 70–99)
GLUCOSE BLD-MCNC: 288 MG/DL (ref 70–99)
GLUCOSE BLD-MCNC: 310 MG/DL (ref 70–99)
GLUCOSE BLD-MCNC: 349 MG/DL (ref 70–99)
GLUCOSE SERPL-MCNC: 237 MG/DL (ref 70–99)
GLUCOSE SERPL-MCNC: 285 MG/DL (ref 70–99)
GLUCOSE SERPL-MCNC: 286 MG/DL (ref 70–99)
HADV DNA NPH QL NAA+NON-PROBE: NOT DETECTED
HCO3 ARTERIAL: 32.9 MMOL/L (ref 21–28)
HCO3 VENOUS: 38.6 MMOL/L (ref 22–29)
HCOV 229E RNA NPH QL NAA+NON-PROBE: NOT DETECTED
HCOV HKU1 RNA NPH QL NAA+NON-PROBE: NOT DETECTED
HCOV NL63 RNA NPH QL NAA+NON-PROBE: NOT DETECTED
HCOV OC43 RNA NPH QL NAA+NON-PROBE: NOT DETECTED
HCT VFR BLD CALC: 50.6 % (ref 42–52)
HCT VFR BLD CALC: 52.3 % (ref 42–52)
HEMOGLOBIN: 15.1 GM/DL (ref 13.5–18)
HEMOGLOBIN: 15.9 GM/DL (ref 13.5–18)
HMPV RNA NPH QL NAA+NON-PROBE: NOT DETECTED
HPIV1 RNA NPH QL NAA+NON-PROBE: NOT DETECTED
HPIV2 RNA NPH QL NAA+NON-PROBE: NOT DETECTED
HPIV3 RNA NPH QL NAA+NON-PROBE: NOT DETECTED
HPIV4 RNA NPH QL NAA+NON-PROBE: NOT DETECTED
IMMATURE NEUTROPHIL %: 0.3 % (ref 0–0.43)
IMMATURE NEUTROPHIL %: 0.4 % (ref 0–0.43)
INR BLD: 1.2 INDEX
L PNEUMO AG UR QL IA: NEGATIVE
LACTIC ACID, SEPSIS: 1 MMOL/L (ref 0.4–2)
LYMPHOCYTES ABSOLUTE: 0.3 K/CU MM
LYMPHOCYTES ABSOLUTE: 1.1 K/CU MM
LYMPHOCYTES RELATIVE PERCENT: 14.8 % (ref 24–44)
LYMPHOCYTES RELATIVE PERCENT: 4.2 % (ref 24–44)
M PNEUMO DNA NPH QL NAA+NON-PROBE: NOT DETECTED
MCH RBC QN AUTO: 26.2 PG (ref 27–31)
MCH RBC QN AUTO: 26.5 PG (ref 27–31)
MCHC RBC AUTO-ENTMCNC: 29.8 % (ref 32–36)
MCHC RBC AUTO-ENTMCNC: 30.4 % (ref 32–36)
MCV RBC AUTO: 87.3 FL (ref 78–100)
MCV RBC AUTO: 87.8 FL (ref 78–100)
METHEMOGLOBIN ARTERIAL: 0.8 %
MONOCYTES ABSOLUTE: 0.1 K/CU MM
MONOCYTES ABSOLUTE: 1.3 K/CU MM
MONOCYTES RELATIVE PERCENT: 0.6 % (ref 0–4)
MONOCYTES RELATIVE PERCENT: 16.8 % (ref 0–4)
MRSA, DNA, NASAL: ABNORMAL
NUCLEATED RBC %: 0 %
NUCLEATED RBC %: 0 %
O2 SAT, VEN: 90.9 % (ref 50–70)
O2 SATURATION: 92.1 % (ref 94–98)
PCO2 ARTERIAL: 70 MMHG (ref 35–48)
PCO2, VEN: 86 MMHG (ref 41–51)
PDW BLD-RTO: 16.8 % (ref 11.7–14.9)
PDW BLD-RTO: 16.9 % (ref 11.7–14.9)
PH BLOOD: 7.28 (ref 7.35–7.45)
PH VENOUS: 7.26 (ref 7.32–7.43)
PLATELET # BLD: 243 K/CU MM (ref 140–440)
PLATELET # BLD: 270 K/CU MM (ref 140–440)
PMV BLD AUTO: 8.9 FL (ref 7.5–11.1)
PMV BLD AUTO: 9.1 FL (ref 7.5–11.1)
PO2 ARTERIAL: 78 MMHG (ref 83–108)
PO2, VEN: 82 MMHG (ref 28–48)
POTASSIUM SERPL-SCNC: 4.7 MMOL/L (ref 3.5–5.1)
POTASSIUM SERPL-SCNC: 4.8 MMOL/L (ref 3.5–5.1)
POTASSIUM SERPL-SCNC: 5.5 MMOL/L (ref 3.5–5.1)
POTASSIUM SERPL-SCNC: 6.1 MMOL/L (ref 3.5–5.1)
PRO-BNP: 758.4 PG/ML
PROCALCITONIN SERPL-MCNC: 0.04 NG/ML
PROTHROMBIN TIME: 15.1 SECONDS (ref 11.7–14.5)
RBC # BLD: 5.76 M/CU MM (ref 4.6–6.2)
RBC # BLD: 5.99 M/CU MM (ref 4.6–6.2)
REASON FOR REJECTION: NORMAL
REJECTED TEST: NORMAL
RSV RNA NPH QL NAA+NON-PROBE: NOT DETECTED
RV+EV RNA NPH QL NAA+NON-PROBE: NOT DETECTED
S PNEUM AG CSF QL: NORMAL
SARS-COV-2 RNA NPH QL NAA+NON-PROBE: NOT DETECTED
SEGMENTED NEUTROPHILS ABSOLUTE COUNT: 5 K/CU MM
SEGMENTED NEUTROPHILS ABSOLUTE COUNT: 7.3 K/CU MM
SEGMENTED NEUTROPHILS RELATIVE PERCENT: 65.3 % (ref 36–66)
SEGMENTED NEUTROPHILS RELATIVE PERCENT: 94.3 % (ref 36–66)
SODIUM BLD-SCNC: 134 MMOL/L (ref 135–145)
SODIUM BLD-SCNC: 135 MMOL/L (ref 135–145)
SODIUM BLD-SCNC: 136 MMOL/L (ref 135–145)
SPECIMEN DESCRIPTION: ABNORMAL
TOTAL IMMATURE NEUTOROPHIL: 0.02 K/CU MM
TOTAL IMMATURE NEUTOROPHIL: 0.03 K/CU MM
TOTAL NUCLEATED RBC: 0 K/CU MM
TOTAL NUCLEATED RBC: 0 K/CU MM
TOTAL PROTEIN: 6.5 GM/DL (ref 6.4–8.2)
TOTAL PROTEIN: 7.3 GM/DL (ref 6.4–8.2)
TROPONIN, HIGH SENSITIVITY: 25 NG/L (ref 0–22)
TROPONIN, HIGH SENSITIVITY: 26 NG/L (ref 0–22)
TROPONIN, HIGH SENSITIVITY: 27 NG/L (ref 0–22)
TROPONIN, HIGH SENSITIVITY: 33 NG/L (ref 0–22)
TROPONIN, HIGH SENSITIVITY: 33 NG/L (ref 0–22)
WBC # BLD: 7.6 K/CU MM (ref 4–10.5)
WBC # BLD: 7.7 K/CU MM (ref 4–10.5)

## 2024-03-13 PROCEDURE — 6370000000 HC RX 637 (ALT 250 FOR IP): Performed by: EMERGENCY MEDICINE

## 2024-03-13 PROCEDURE — 6370000000 HC RX 637 (ALT 250 FOR IP): Performed by: INTERNAL MEDICINE

## 2024-03-13 PROCEDURE — 70498 CT ANGIOGRAPHY NECK: CPT

## 2024-03-13 PROCEDURE — 94640 AIRWAY INHALATION TREATMENT: CPT

## 2024-03-13 PROCEDURE — 2580000003 HC RX 258: Performed by: INTERNAL MEDICINE

## 2024-03-13 PROCEDURE — 71275 CT ANGIOGRAPHY CHEST: CPT

## 2024-03-13 PROCEDURE — 80048 BASIC METABOLIC PNL TOTAL CA: CPT

## 2024-03-13 PROCEDURE — 6360000002 HC RX W HCPCS: Performed by: EMERGENCY MEDICINE

## 2024-03-13 PROCEDURE — 5A09457 ASSISTANCE WITH RESPIRATORY VENTILATION, 24-96 CONSECUTIVE HOURS, CONTINUOUS POSITIVE AIRWAY PRESSURE: ICD-10-PCS | Performed by: INTERNAL MEDICINE

## 2024-03-13 PROCEDURE — 96375 TX/PRO/DX INJ NEW DRUG ADDON: CPT

## 2024-03-13 PROCEDURE — 87449 NOS EACH ORGANISM AG IA: CPT

## 2024-03-13 PROCEDURE — 2140000000 HC CCU INTERMEDIATE R&B

## 2024-03-13 PROCEDURE — 2580000003 HC RX 258: Performed by: EMERGENCY MEDICINE

## 2024-03-13 PROCEDURE — 82803 BLOOD GASES ANY COMBINATION: CPT

## 2024-03-13 PROCEDURE — 71045 X-RAY EXAM CHEST 1 VIEW: CPT

## 2024-03-13 PROCEDURE — 93005 ELECTROCARDIOGRAM TRACING: CPT | Performed by: EMERGENCY MEDICINE

## 2024-03-13 PROCEDURE — 84145 PROCALCITONIN (PCT): CPT

## 2024-03-13 PROCEDURE — 85610 PROTHROMBIN TIME: CPT

## 2024-03-13 PROCEDURE — 99222 1ST HOSP IP/OBS MODERATE 55: CPT | Performed by: INTERNAL MEDICINE

## 2024-03-13 PROCEDURE — 36415 COLL VENOUS BLD VENIPUNCTURE: CPT

## 2024-03-13 PROCEDURE — 85025 COMPLETE CBC W/AUTO DIFF WBC: CPT

## 2024-03-13 PROCEDURE — 6360000002 HC RX W HCPCS: Performed by: INTERNAL MEDICINE

## 2024-03-13 PROCEDURE — 94660 CPAP INITIATION&MGMT: CPT

## 2024-03-13 PROCEDURE — 87081 CULTURE SCREEN ONLY: CPT

## 2024-03-13 PROCEDURE — 84484 ASSAY OF TROPONIN QUANT: CPT

## 2024-03-13 PROCEDURE — 96365 THER/PROPH/DIAG IV INF INIT: CPT

## 2024-03-13 PROCEDURE — 83605 ASSAY OF LACTIC ACID: CPT

## 2024-03-13 PROCEDURE — 2700000000 HC OXYGEN THERAPY PER DAY

## 2024-03-13 PROCEDURE — 82962 GLUCOSE BLOOD TEST: CPT

## 2024-03-13 PROCEDURE — 87641 MR-STAPH DNA AMP PROBE: CPT

## 2024-03-13 PROCEDURE — 83880 ASSAY OF NATRIURETIC PEPTIDE: CPT

## 2024-03-13 PROCEDURE — 94761 N-INVAS EAR/PLS OXIMETRY MLT: CPT

## 2024-03-13 PROCEDURE — 0202U NFCT DS 22 TRGT SARS-COV-2: CPT

## 2024-03-13 PROCEDURE — 70450 CT HEAD/BRAIN W/O DYE: CPT

## 2024-03-13 PROCEDURE — 2580000003 HC RX 258: Performed by: STUDENT IN AN ORGANIZED HEALTH CARE EDUCATION/TRAINING PROGRAM

## 2024-03-13 PROCEDURE — 84132 ASSAY OF SERUM POTASSIUM: CPT

## 2024-03-13 PROCEDURE — 80053 COMPREHEN METABOLIC PANEL: CPT

## 2024-03-13 PROCEDURE — 87899 AGENT NOS ASSAY W/OPTIC: CPT

## 2024-03-13 PROCEDURE — 37799 UNLISTED PX VASCULAR SURGERY: CPT

## 2024-03-13 PROCEDURE — 6370000000 HC RX 637 (ALT 250 FOR IP): Performed by: STUDENT IN AN ORGANIZED HEALTH CARE EDUCATION/TRAINING PROGRAM

## 2024-03-13 PROCEDURE — 87040 BLOOD CULTURE FOR BACTERIA: CPT

## 2024-03-13 PROCEDURE — 87070 CULTURE OTHR SPECIMN AEROBIC: CPT

## 2024-03-13 RX ORDER — ONDANSETRON 2 MG/ML
4 INJECTION INTRAMUSCULAR; INTRAVENOUS EVERY 6 HOURS PRN
Status: DISCONTINUED | OUTPATIENT
Start: 2024-03-13 | End: 2024-03-20 | Stop reason: HOSPADM

## 2024-03-13 RX ORDER — IPRATROPIUM BROMIDE AND ALBUTEROL SULFATE 2.5; .5 MG/3ML; MG/3ML
1 SOLUTION RESPIRATORY (INHALATION)
Status: DISCONTINUED | OUTPATIENT
Start: 2024-03-13 | End: 2024-03-16

## 2024-03-13 RX ORDER — 0.9 % SODIUM CHLORIDE 0.9 %
1000 INTRAVENOUS SOLUTION INTRAVENOUS ONCE
Status: COMPLETED | OUTPATIENT
Start: 2024-03-13 | End: 2024-03-13

## 2024-03-13 RX ORDER — IPRATROPIUM BROMIDE AND ALBUTEROL SULFATE 2.5; .5 MG/3ML; MG/3ML
2 SOLUTION RESPIRATORY (INHALATION) ONCE
Status: COMPLETED | OUTPATIENT
Start: 2024-03-13 | End: 2024-03-13

## 2024-03-13 RX ORDER — LOSARTAN POTASSIUM 25 MG/1
25 TABLET ORAL DAILY
Status: DISCONTINUED | OUTPATIENT
Start: 2024-03-13 | End: 2024-03-20 | Stop reason: HOSPADM

## 2024-03-13 RX ORDER — SODIUM CHLORIDE 9 MG/ML
INJECTION, SOLUTION INTRAVENOUS PRN
Status: DISCONTINUED | OUTPATIENT
Start: 2024-03-13 | End: 2024-03-20 | Stop reason: HOSPADM

## 2024-03-13 RX ORDER — DEXTROSE MONOHYDRATE 100 MG/ML
INJECTION, SOLUTION INTRAVENOUS CONTINUOUS PRN
Status: DISCONTINUED | OUTPATIENT
Start: 2024-03-13 | End: 2024-03-20 | Stop reason: HOSPADM

## 2024-03-13 RX ORDER — ACETAMINOPHEN 325 MG/1
650 TABLET ORAL EVERY 6 HOURS PRN
Status: DISCONTINUED | OUTPATIENT
Start: 2024-03-13 | End: 2024-03-20 | Stop reason: HOSPADM

## 2024-03-13 RX ORDER — GLUCAGON 1 MG/ML
1 KIT INJECTION PRN
Status: DISCONTINUED | OUTPATIENT
Start: 2024-03-13 | End: 2024-03-13

## 2024-03-13 RX ORDER — PREDNISONE 20 MG/1
40 TABLET ORAL DAILY
Status: COMPLETED | OUTPATIENT
Start: 2024-03-13 | End: 2024-03-17

## 2024-03-13 RX ORDER — INSULIN LISPRO 100 [IU]/ML
0-8 INJECTION, SOLUTION INTRAVENOUS; SUBCUTANEOUS
Status: DISCONTINUED | OUTPATIENT
Start: 2024-03-13 | End: 2024-03-20 | Stop reason: HOSPADM

## 2024-03-13 RX ORDER — AZITHROMYCIN 250 MG/1
500 TABLET, FILM COATED ORAL ONCE
Status: DISCONTINUED | OUTPATIENT
Start: 2024-03-13 | End: 2024-03-13

## 2024-03-13 RX ORDER — INSULIN LISPRO 100 [IU]/ML
0-4 INJECTION, SOLUTION INTRAVENOUS; SUBCUTANEOUS NIGHTLY
Status: DISCONTINUED | OUTPATIENT
Start: 2024-03-13 | End: 2024-03-20 | Stop reason: HOSPADM

## 2024-03-13 RX ORDER — DEXTROSE MONOHYDRATE 100 MG/ML
INJECTION, SOLUTION INTRAVENOUS CONTINUOUS PRN
Status: DISCONTINUED | OUTPATIENT
Start: 2024-03-13 | End: 2024-03-13

## 2024-03-13 RX ORDER — ONDANSETRON 4 MG/1
4 TABLET, ORALLY DISINTEGRATING ORAL EVERY 8 HOURS PRN
Status: DISCONTINUED | OUTPATIENT
Start: 2024-03-13 | End: 2024-03-20 | Stop reason: HOSPADM

## 2024-03-13 RX ORDER — SODIUM CHLORIDE 0.9 % (FLUSH) 0.9 %
5-40 SYRINGE (ML) INJECTION EVERY 12 HOURS SCHEDULED
Status: DISCONTINUED | OUTPATIENT
Start: 2024-03-13 | End: 2024-03-20 | Stop reason: HOSPADM

## 2024-03-13 RX ORDER — PREDNISONE 5 MG/1
5 TABLET ORAL DAILY
COMMUNITY

## 2024-03-13 RX ORDER — GLUCAGON 1 MG/ML
1 KIT INJECTION PRN
Status: DISCONTINUED | OUTPATIENT
Start: 2024-03-13 | End: 2024-03-20 | Stop reason: HOSPADM

## 2024-03-13 RX ORDER — POLYETHYLENE GLYCOL 3350 17 G/17G
17 POWDER, FOR SOLUTION ORAL DAILY PRN
Status: DISCONTINUED | OUTPATIENT
Start: 2024-03-13 | End: 2024-03-20 | Stop reason: HOSPADM

## 2024-03-13 RX ORDER — SODIUM CHLORIDE 0.9 % (FLUSH) 0.9 %
5-40 SYRINGE (ML) INJECTION 2 TIMES DAILY
Status: DISCONTINUED | OUTPATIENT
Start: 2024-03-13 | End: 2024-03-18 | Stop reason: SDUPTHER

## 2024-03-13 RX ORDER — ENOXAPARIN SODIUM 100 MG/ML
30 INJECTION SUBCUTANEOUS 2 TIMES DAILY
Status: DISCONTINUED | OUTPATIENT
Start: 2024-03-13 | End: 2024-03-20 | Stop reason: HOSPADM

## 2024-03-13 RX ORDER — SODIUM CHLORIDE 0.9 % (FLUSH) 0.9 %
5-40 SYRINGE (ML) INJECTION PRN
Status: DISCONTINUED | OUTPATIENT
Start: 2024-03-13 | End: 2024-03-20 | Stop reason: HOSPADM

## 2024-03-13 RX ORDER — ACETAMINOPHEN 650 MG/1
650 SUPPOSITORY RECTAL EVERY 6 HOURS PRN
Status: DISCONTINUED | OUTPATIENT
Start: 2024-03-13 | End: 2024-03-20 | Stop reason: HOSPADM

## 2024-03-13 RX ORDER — ALBUTEROL SULFATE 90 UG/1
2 AEROSOL, METERED RESPIRATORY (INHALATION) EVERY 6 HOURS PRN
Status: DISCONTINUED | OUTPATIENT
Start: 2024-03-13 | End: 2024-03-20 | Stop reason: HOSPADM

## 2024-03-13 RX ADMIN — INSULIN LISPRO 4 UNITS: 100 INJECTION, SOLUTION INTRAVENOUS; SUBCUTANEOUS at 17:02

## 2024-03-13 RX ADMIN — IPRATROPIUM BROMIDE AND ALBUTEROL SULFATE 1 DOSE: .5; 2.5 SOLUTION RESPIRATORY (INHALATION) at 08:06

## 2024-03-13 RX ADMIN — METHYLPREDNISOLONE SODIUM SUCCINATE 125 MG: 125 INJECTION, POWDER, FOR SOLUTION INTRAMUSCULAR; INTRAVENOUS at 00:09

## 2024-03-13 RX ADMIN — ENOXAPARIN SODIUM 30 MG: 100 INJECTION SUBCUTANEOUS at 08:33

## 2024-03-13 RX ADMIN — SODIUM CHLORIDE, PRESERVATIVE FREE 10 ML: 5 INJECTION INTRAVENOUS at 06:44

## 2024-03-13 RX ADMIN — PREDNISONE 40 MG: 20 TABLET ORAL at 08:33

## 2024-03-13 RX ADMIN — INSULIN LISPRO 4 UNITS: 100 INJECTION, SOLUTION INTRAVENOUS; SUBCUTANEOUS at 08:33

## 2024-03-13 RX ADMIN — IPRATROPIUM BROMIDE AND ALBUTEROL SULFATE 2 DOSE: 2.5; .5 SOLUTION RESPIRATORY (INHALATION) at 00:14

## 2024-03-13 RX ADMIN — IPRATROPIUM BROMIDE AND ALBUTEROL SULFATE 1 DOSE: .5; 2.5 SOLUTION RESPIRATORY (INHALATION) at 13:05

## 2024-03-13 RX ADMIN — IPRATROPIUM BROMIDE AND ALBUTEROL SULFATE 1 DOSE: .5; 2.5 SOLUTION RESPIRATORY (INHALATION) at 19:51

## 2024-03-13 RX ADMIN — SODIUM CHLORIDE, PRESERVATIVE FREE 10 ML: 5 INJECTION INTRAVENOUS at 22:24

## 2024-03-13 RX ADMIN — SODIUM CHLORIDE 1000 ML: 9 INJECTION, SOLUTION INTRAVENOUS at 02:25

## 2024-03-13 RX ADMIN — DEXTROSE MONOHYDRATE 250 ML: 100 INJECTION, SOLUTION INTRAVENOUS at 13:12

## 2024-03-13 RX ADMIN — VANCOMYCIN HYDROCHLORIDE 2000 MG: 5 INJECTION, POWDER, LYOPHILIZED, FOR SOLUTION INTRAVENOUS at 03:40

## 2024-03-13 RX ADMIN — AZITHROMYCIN DIHYDRATE 500 MG: 500 INJECTION, POWDER, LYOPHILIZED, FOR SOLUTION INTRAVENOUS at 06:49

## 2024-03-13 RX ADMIN — CEFEPIME 2000 MG: 2 INJECTION, POWDER, FOR SOLUTION INTRAVENOUS at 18:36

## 2024-03-13 RX ADMIN — INSULIN HUMAN 10 UNITS: 100 INJECTION, SOLUTION PARENTERAL at 13:36

## 2024-03-13 RX ADMIN — SODIUM CHLORIDE 1000 ML: 9 INJECTION, SOLUTION INTRAVENOUS at 02:21

## 2024-03-13 RX ADMIN — CEFEPIME 2000 MG: 2 INJECTION, POWDER, FOR SOLUTION INTRAVENOUS at 09:57

## 2024-03-13 RX ADMIN — ENOXAPARIN SODIUM 30 MG: 100 INJECTION SUBCUTANEOUS at 22:24

## 2024-03-13 RX ADMIN — VANCOMYCIN HYDROCHLORIDE 1500 MG: 1.5 INJECTION, POWDER, LYOPHILIZED, FOR SOLUTION INTRAVENOUS at 17:08

## 2024-03-13 RX ADMIN — SODIUM ZIRCONIUM CYCLOSILICATE 10 G: 5 POWDER, FOR SUSPENSION ORAL at 13:30

## 2024-03-13 RX ADMIN — CEFEPIME 2000 MG: 2 INJECTION, POWDER, FOR SOLUTION INTRAVENOUS at 01:59

## 2024-03-13 ASSESSMENT — LIFESTYLE VARIABLES
HOW MANY STANDARD DRINKS CONTAINING ALCOHOL DO YOU HAVE ON A TYPICAL DAY: PATIENT DOES NOT DRINK
HOW OFTEN DO YOU HAVE A DRINK CONTAINING ALCOHOL: NEVER

## 2024-03-13 ASSESSMENT — PAIN SCALES - GENERAL: PAINLEVEL_OUTOF10: 0

## 2024-03-13 NOTE — PROGRESS NOTES
PHARMACY VANCOMYCIN MONITORING SERVICE  Pharmacy consulted by Dr. Patel for monitoring and adjustment.    Indication for treatment: Vancomycin indication: HAP  Goal trough: Trough Goal: 15-20 mcg/mL  AUC/MARIALUISA: 400-600    Risk Factors for MRSA Identified:   Hospitalization within the past 90 days, Received IV antibiotics within the past 90 days    Pertinent Laboratory Values:   Temp Readings from Last 3 Encounters:   03/12/24 99.9 °F (37.7 °C) (Oral)   02/06/24 97.9 °F (36.6 °C) (Oral)   01/09/24 98.2 °F (36.8 °C) (Oral)     Recent Labs     03/13/24  0006   WBC 7.6     Recent Labs     03/13/24  0006   BUN 10   CREATININE 0.8*     Estimated Creatinine Clearance: 115 mL/min (A) (based on SCr of 0.8 mg/dL (L)).    Intake/Output Summary (Last 24 hours) at 3/13/2024 0641  Last data filed at 3/13/2024 0306  Gross per 24 hour   Intake 98.36 ml   Output --   Net 98.36 ml     Last Encounter Weight:  Wt Readings from Last 3 Encounters:   03/12/24 102.5 kg (226 lb)   03/01/24 102.5 kg (226 lb)   02/13/24 101.2 kg (223 lb)     Pertinent Cultures:   Date    Source    Results  03/13   Blood    In process  03/13   Strep Pneumo   To be collected  03/13   Legionella   To be collected  03/13   Respiratory   To be collected  03/13   MRSA Screen (Nasal) To be collected    Assessment:  HPI: 63 y.o. male with history of COPD, chronic respiratory failure on home oxygen, and DM type II.  Patient has had recurrent admissions for COPD exacerbation.  Patient brought to ED as a stroke alert and was admitted with respiratory failure and pneumonia.    SCr = 0.8, BUN = 10, and incomplete I/O data  Day(s) of therapy: 1 of 7  Vancomycin concentration:   03/15 - To be collected    Plan:  Vancomycin 2,000 mg IV initial dose; Follow with Vancomycin 1,500 mg IV Q 12 Hours  Steady state predictions: AUC: 550; Trough: 15.2  Pharmacy will continue to monitor patient and adjust therapy as indicated    VANCOMYCIN CONCENTRATION SCHEDULED FOR 03/15/2024 @

## 2024-03-13 NOTE — ED NOTES
ED TO INPATIENT SBAR HANDOFF    Patient Name: Jeromy Bojorquez   :  1960  63 y.o.   Preferred Name  Marcelo  Family/Caregiver Present no   Restraints no   C-SSRS: Risk of Suicide: No Risk  Sitter no   Sepsis Risk Score Sepsis Risk Score: 6.74      Situation  Chief Complaint   Patient presents with    Shortness of Breath     Brief Description of Patient's Condition: Pt arrives with SOB, stroke alert called due to dizziness lkw was 2300, pt went to Ct and was unable to lay flat and became hypoxic on 4L NC, pt taken back to room placed on bipap abnormal vbg wears 02 2l nc at baseline  Evaluated by [facility], admitted for sx control. Pt dx with pneumonia, sepsis alert called in Ed. Alert and oriented. Pt's repeat K came back elevated, so D10 and insulin are being given in the ED.   Mental Status: oriented, alert, coherent, and logical  Arrived from: home    Imaging:   CTA PULMONARY W CONTRAST   Final Result      No pulmonary embolism seen.      Since the patchy bilateral airspace disease most suggestive of pneumonia with reactive adenopathy suspected.      Elevated right hemidiaphragm.      Electronically signed by Felipe Porras MD      CTA HEAD NECK W CONTRAST   Final Result      Since the patchy bilateral airspace disease.      Atherosclerotic changes noted in the carotid bifurcation bilaterally with mural calcination seen without flow significant stenosis seen.                  PROCEDURE: CT ANGIOGRAPHY HEAD WITH/WITHOUT CONTRAST      INDICATION: gait ataxia      COMPARISON: none      TECHNIQUE: Axial CT imaging obtained through the head prior to and following administration of IV contrast. Axial images, multiplanar reformatted images, and maximum intensity projection images were reviewed for CT angiographic technique.   IV contrast: 75 mL Isovue-370.      FINDINGS:      ANTERIOR CIRCULATION: The intracranial internal carotid arteries, anterior cerebral arteries, and middle cerebral arteries demonstrate no

## 2024-03-13 NOTE — ED NOTES
[facility] called back in regards to pts CT, notified her that pt had to be brought back due to becoming hypoxic. Dr. Ramirez made aware.

## 2024-03-13 NOTE — ED PROVIDER NOTES
Triage Chief Complaint:   Shortness of Breath    [place]:  Jeromy Bojorquez is a 63 y.o. male that presents via EMS for gait instability and dizziness.  The patient states that just prior to arrival, around 2300 he stood up, felt extremely lightheaded and felt extremely unsteady on his feet.  States that he is barely able to walk because of the unsteadiness and he has constant dizziness.  Denies any vertigo.  No headache, vision changes, motor or sensory deficits, recent trauma.  States that he was in his normal state of health prior to arrival and onset of the symptoms.  No history of CVA.  Denies any new shortness of breath, cough, fever, chest pain, abdominal pain, nausea or vomiting.    ROS:  At least 10 systems reviewed and otherwise acutely negative except as in the [place].    Past Medical History:   Diagnosis Date    Acute exacerbation of chronic obstructive pulmonary disease (COPD) ([facility]) 3/23/2023    Acute on chronic respiratory failure with hypoxemia ([facility]) 1/17/2022    Arthritis     Cataract     Chronic hypoxemic respiratory failure ([facility]) 6/30/2022    COPD (chronic obstructive pulmonary disease) ([facility])     COPD, severe ([facility]) 1/17/2022    Diabetes mellitus ([facility])     Enlarged prostate     Former smoker 1/17/2022    Hyperlipidemia     Hypertension     Restrictive lung disease 3/23/2023     Past Surgical History:   Procedure Laterality Date    COLONOSCOPY      SKIN BIOPSY      TONSILLECTOMY       No family history on file.  Social History     Socioeconomic History    Marital status: Single     Spouse name: Not on file    Number of children: Not on file    Years of education: Not on file    Highest education level: Not on file   Occupational History    Not on file   Tobacco Use    Smoking status: Former     Current packs/day: 0.00     Average packs/day: 0.5 packs/day for 47.1 years (23.5 ttl pk-yrs)     Types: Cigarettes     Start date: 12/8/1973     Quit date: 1/1/2021     Years since quitting: 3.1    Smokeless

## 2024-03-13 NOTE — PROGRESS NOTES
Pt admitted by my [facility] overnight. 63 y.o. male with COPD, chronic respiratory failure on home oxygen, diabetes mellitus type 2, not on long term insulin p/w generalized weakness, shortness of breath, VBG 7.26/ 86/38, ABG- 7.28/ 70/32, Pt on BiPAP, labs w repeat K 6.1 from previous 4.7, will repeat K prior to initiating treatment, as sample hemoyzed. Trops downtrending 25< 33, EKG w ST-depression anterior leads, Twi in inferior leads, will d/w cardiology regardig need for any further ischemic workup. CTH /CTA non acute, CTA chest unremarkable. Pt on Vanco/ cefepime, Azithromycin, s/p solumedrol and currently on prednisone and frequent brreathing trreatments besides BiPAP

## 2024-03-13 NOTE — ED NOTES
While in Ct pt became anxious stating he could not breath, pt oxygen down to 85%, pt placed on 10L nasal canula to be transported back to room. Resp called to place pt on bipap, unable to do Ct at this time

## 2024-03-13 NOTE — PROGRESS NOTES
4 Eyes Skin Assessment     NAME:  Jeromy Bojorquez  YOB: 1960  MEDICAL RECORD NUMBER:  [id number]    The patient is being assessed for  Admission    I agree that at least one RN has performed a thorough Head to Toe Skin Assessment on the patient. ALL assessment sites listed below have been assessed.      Areas assessed by both nurses:    Head, Face, Ears, Shoulders, Back, Chest, Arms, Elbows, Hands, Sacrum. Buttock, Coccyx, Ischium, Legs. Feet and Heels, and Under Medical Devices         Does the Patient have a Wound? No noted wound(s)       Felix Prevention initiated by RN: No  Wound Care Orders initiated by RN: No    Pressure Injury (Stage 3,4, Unstageable, DTI, NWPT, and Complex wounds) if present, place Wound referral order by RN under : No    New Ostomies, if present place, Ostomy referral order under : No     Nurse 1 eSignature: Electronically signed by Jese Dupree RN on 3/13/24 at 3:01 PM EDT    **SHARE this note so that the co-signing nurse can place an eSignature**    Nurse 2 eSignature: Electronically signed by James Olivas RN on 3/13/24 at 7:41 PM EDT

## 2024-03-13 NOTE — H&P
History and Physical      Name:  Jeromy Bojorquez /Age/Sex: 1960  (63 y.o. male)   MRN & CSN:  [id number] & [id number] Encounter Date/Time: 3/13/2024 2:51 AM EDT   Location:  ED26/ED-26 PCP: No primary care provider on file.       Hospital Day: 2    Assessment and Plan:     #.  Acute on chronic respiratory failure with hypoxia and hypercapnia  -VBG-pH 7.26, pCO2 86, pO2 82, HCO3 38.6.  -Chest x-ray-mild left basilar airspace disease  -CTA chest-patchy bilateral airspace disease, elevated right hemidiaphragm.  -Patient currently on BiPAP-continue and wean as tolerated.  -Repeat VBG.  -Consult pulmonology.    #.  Pneumonia  -Patient had hospital admission 2024, 2024 and was treated with levofloxacin, ceftriaxone, azithromycin  -Continue broad-spectrum antibiotics-vancomycin, cefepime.  -Urine Streptococcus pneumonia antigen, Legionella antigen, sputum culture.  -Check procalcitonin.    #.  Patient met sepsis criteria given his tachycardia and tachypnea which could be secondary to COPD exacerbation  -Patient has no leukocytosis, lactic acid 1, patient afebrile.  -Blood cultures drawn in ED.  -Patient received IV fluids as per sepsis protocol, got broad-spectrum antibiotics.    #.  Bilateral lower extremity weakness  -There was a stroke alert initially.  -CT head, CTA head and neck-no acute process.  -ED physician discussed with [facility] stroke team and felt patient was not a TNK candidate, overall presentation was likely secondary to hypercapnic respiratory failure.  -PT/OT evaluation.  -If suspicion for CVA high on reevaluation-can order MRI.  -Continue NIHSS x 6    #.  Diabetes mellitus type 2, not on long-term insulin  -Patient is on glimepiride, metformin  -Continue insulin sliding scale with hypoglycemia protocol    #.  Hypertension-continue losartan    #.  Severe COPD, chronic respiratory failure on home oxygen at 2 L/min  -Patient had 2 admissions 2024, 2024 for COPD  SYSTEM: No evidence for intracranial venous thrombosis. Refer to noncontrast CT of the head for additional results.. IMPRESSION: No significant intracranial vascular pathology identified. Electronically signed by Felipe Porras MD    CT HEAD WO CONTRAST    Result Date: 3/13/2024  CT HEAD WITHOUT CONTRAST HISTORY: Dizziness COMPARISON: None FINDINGS: The ventricles are normal in size and configuration with no midline shift or mass effect. There is no evidence of intracranial hemorrhage or abnormal extra-axial fluid collection. There are no definite signs to suggest acute infarction. However, if there is concern of early infarction or other subtle intracranial abnormality, MRI correlation should be considered. Visualized portions of the paranasal sinuses appear clear.     No evidence of acute intracranial abnormality. Electronically signed by Felipe Porras MD    XR CHEST PORTABLE    Result Date: 3/13/2024  AP CHEST HISTORY: Dyspnea COMPARISON 2/1/2024 FINDINGS: The heart size is within normal limits. Hazy airspace disease is seen medially at the left lung base suggesting possible pneumonia or atelectasis.     Mild left basilar airspace disease. Electronically signed by Felipe Porras MD      Personally reviewed Lab Studies, Imaging, and discussed case with ED physician.    Electronically signed by Hitesh Patel MD on 3/13/2024 at 2:51 AM    Comment: Please note this report has been produced using speech recognition software and may contain errors related to that system including errors in grammar, punctuation, and spelling, as well as words and phrases that may be inappropriate. If there are any questions or concerns please feel free to contact the dictating provider for clarification.

## 2024-03-13 NOTE — CONSULTS
Pulmonary Consult Note      Reason for Consult: hypoxic and hypercapneic resp failure    Requesting Physician: Hitesh Patel MD     Subjective:   CHIEF COMPLAINT :SOB    Patient Active Problem List    Diagnosis Date Noted    Controlled type 2 diabetes mellitus without complication, without long-term current use of insulin (HCC) 03/19/2023     Priority: Medium    Callus 03/19/2023     Priority: Medium    Hypoxia 03/19/2023     Priority: Medium    Epistaxis 03/19/2023     Priority: Medium    Acute on chronic respiratory failure with hypoxia and hypercapnia (HCC) 03/13/2024    ANIVAL (obstructive sleep apnea) 03/01/2024    Hypersomnia 03/01/2024    Obesity (BMI 30-39.9) 03/01/2024    Diastolic heart failure ([facility]) 02/13/2024    Primary hypertension 02/13/2024    Acute respiratory failure with hypoxia and hypercapnia ([facility]) 02/01/2024    Chronic respiratory failure with hypoxia and hypercapnia ([facility]) 01/12/2024    COPD exacerbation ([facility]) 01/07/2024    Lung nodule 04/16/2023    Acute exacerbation of chronic obstructive pulmonary disease (COPD) ([facility]) 03/23/2023    Restrictive lung disease 03/23/2023    Chronic obstructive pulmonary disease ([facility]) 01/17/2022    Former smoker 01/17/2022    Testicular swelling, left 08/03/2021    Uncontrolled type 2 diabetes mellitus with hyperglycemia ([facility]) 08/03/2021    Continuous dependence on cigarette smoking 04/14/2020    Secondary polycythemia 04/14/2020    Shortness of breath 07/02/2018        HPI:                The patient is a 63 y.o. male with significant past medical history of COPD- 30 pk yr smoking, chronic respiratory failure on home oxygen 2 L/min, diabetes mellitus type 2  presents with complaints of SOB and LE weakness. He was evaluated as stroke. He has symptoms of ANIVAL. He has no fever, no chest pain, little cough, little phlegm, no hemoptysis, no loss of weight. His CT chest showed no PE but has multifocal Pneumonia. At this time he is on the BIPAP. He is in mild resp

## 2024-03-13 NOTE — CARE COORDINATION
CM reviewed chart and saw pt at bedside.  At this time pt is on bipap and difficult to conversant with.  Noted appears to not have PCP.  Was admitted in Jan and Feb with pneumonia.  CM will follow.

## 2024-03-13 NOTE — ED NOTES
Medication History  [facility]    Patient Name: Jeromy Bojorquez 1960     Medication history has been completed by: Rose Merida [facility]    Source(s) of information: patient and insurance claims     Primary Care Physician: No primary care provider on file.     Pharmacy: [facility]    Allergies as of 03/12/2024    (No Known Allergies)        Prior to Admission medications    Medication Sig Start Date End Date Taking? Authorizing Provider   predniSONE (DELTASONE) 5 MG tablet Take 1 tablet by mouth daily   Yes Michelle Guzman MD   metFORMIN (GLUCOPHAGE) 500 MG tablet Take 1 tablet by mouth 2 times daily (with meals) 2/25/24   Michelle Guzman MD   blood glucose test strips (ONETOUCH ULTRA) strip TEST 3 TIMES A DAY AND AS NEEDED FOR SYMPTOMS OF IRREGULAR BLOOD SUAGR 2/6/24   Malina Kent MD   losartan (COZAAR) 25 MG tablet TAKE 1 TABLET BY MOUTH EVERY DAY 5/4/23   Svetlana Sapp MD   glimepiride (AMARYL) 2 MG tablet TAKE 1 TABLET BY MOUTH TWICE A DAY 5/4/23   Svetlana Sapp MD   albuterol sulfate HFA (VENTOLIN HFA) 108 (90 Base) MCG/ACT inhaler Inhale 2 puffs into the lungs every 6 hours as needed for Wheezing 3/23/23   Brian Hummel MD   umeclidinium-vilanterol (ANORO ELLIPTA) 62.5-25 MCG/INH AEPB inhaler Inhale 1 puff into the lungs in the morning. 7/18/22   Brian Hummel MD   blood glucose monitor strips Test 3 times a day & as needed for symptoms of irregular blood glucose. Please fill with what is coverd by insurance 7/18/22   Svetlana Sapp MD   Lancets MISC Use one lancet 4 times daily 7/18/22   Svetlana Sapp MD   blood glucose monitor strips Test 3-4 times a day & as needed for symptoms of irregular blood glucose. 1/17/22   Svetlana Sapp MD   blood glucose monitor kit and supplies Use 3-4 times daily 1/17/22   Svetlana Sapp MD     Medications added or changed (ex. new medication, dosage change, interval change, formulation change):  Prednisone 5 mg daily

## 2024-03-13 NOTE — ED NOTES
Implemented All Universal Safety Interventions:  [place] to call system. Call bell, personal items and telephone within reach. Instruct patient to call for assistance. Room bathroom lighting operational. Non-slip footwear when patient is off stretcher. Physically safe environment: no spills, clutter or unnecessary equipment. Stretcher in lowest position, wheels locked, appropriate side rails in place. [facility] tele stroke states call back when pt returns from CT.

## 2024-03-13 NOTE — ED NOTES
1430 perfect serve message sent to Dr Centeno on in pt consult from hospitalist    1430 Dr Centeno acknowledged perfect serve message. Added to treatment team.

## 2024-03-13 NOTE — ED NOTES
Patient changed into gown and bed linens changed. Patients belongings bagged in belongings bag and placed on bed with patient.

## 2024-03-13 NOTE — CONSULTS
CARDIOLOGY CONSULT NOTE         Reason for consultation: Acute hypoxemic respiratory failure      Primary care physician: No primary care provider on file.      Chief Complaints :  Chief Complaint   Patient presents with    Shortness of Breath        History of present illness:Jeromy ISAAC is a 63 y.o.year old male who has prior history of COPD, chronic respiratory failure on oxygen at home, hypertension, diabetes mellitus who is now admitted with acute on chronic respiratory failure.  Upon my evaluation he is on BiPAP.  We are asked to see him for elevated troponin.  Patient himself denies any chest discomfort.  His main complaint is respiratory failure and he is currently on BiPAP.    Review of Systems:     All systems negative except as marked.        Physical Examination:    Vitals:    03/13/24 1643   BP:    Pulse:    Resp:    Temp:    SpO2: 96%        General Appearance: In moderate respiratory distress currently on BiPAP.  Constitutional:  No acute distress, non-toxic appearance.    HENT:  Normocephalic, Atraumatic,   Eyes:  PERRL, EOMI, Conjunctiva normal, No discharge.   Respiratory: On BiPAP   cardiovascular: S1, S2, no murmurs, gallops. JVD wnl  Abdomen /GI:   Soft, No tenderness   Genitourinary: No costovertebral angle tenderness   Musculoskeletal:  No edema, no tenderness, no deformities.   Integument:  Well hydrated, no rash   Neurologic:  Alert & oriented x 3, no focal deficits noted       Medical decision making and Data review:    Lab Review   Recent Labs     03/13/24  0637   WBC 7.7   HGB 15.1   HCT 50.6         Recent Labs     03/13/24  1514      K 4.8   CL 94*   CO2 31   BUN 11   CREATININE 0.7*     Recent Labs     03/13/24  1514   AST 10*   ALT 9*   BILITOT 0.5   ALKPHOS 77     No results for input(s): \"TROPONINT\" in the last 72 hours.    Recent Labs     03/13/24  0006   PROBNP 758.4*     Lab Results   Component Value Date    INR 1.2 03/13/2024    PROTIME 15.1 (H) 03/13/2024

## 2024-03-13 NOTE — ED NOTES
ED TO INPATIENT SBAR HANDOFF    Patient Name: Jeromy Bojorquez   :  1960  63 y.o.   Preferred Name  Marcelo   Family/Caregiver Present no   Restraints no   C-SSRS: Risk of Suicide: No Risk  Sitter no   Sepsis Risk Score Sepsis Risk Score: 6.92      Situation  Chief Complaint   Patient presents with    Shortness of Breath     Brief Description of Patient's Condition: Pt arrives with SOB, stroke alert called due to dizziness lkw was 2300, pt went to Ct and was unable to lay flat and became hypoxic on 4L NC, pt taken back to room placed on bipap abnormal vbg wears 02 2l nc at baseline  Evaluated by [facility], admitted for sx control. Pt dx with pneumonia, sepsis alert called in Ed. Alert and oriented.   Mental Status: alert  Arrived from: home    Imaging:   CTA PULMONARY W CONTRAST   Final Result      No pulmonary embolism seen.      Since the patchy bilateral airspace disease most suggestive of pneumonia with reactive adenopathy suspected.      Elevated right hemidiaphragm.      Electronically signed by Felipe Porras MD      CTA HEAD NECK W CONTRAST   Final Result      Since the patchy bilateral airspace disease.      Atherosclerotic changes noted in the carotid bifurcation bilaterally with mural calcination seen without flow significant stenosis seen.                  PROCEDURE: CT ANGIOGRAPHY HEAD WITH/WITHOUT CONTRAST      INDICATION: gait ataxia      COMPARISON: none      TECHNIQUE: Axial CT imaging obtained through the head prior to and following administration of IV contrast. Axial images, multiplanar reformatted images, and maximum intensity projection images were reviewed for CT angiographic technique.   IV contrast: 75 mL Isovue-370.      FINDINGS:      ANTERIOR CIRCULATION: The intracranial internal carotid arteries, anterior cerebral arteries, and middle cerebral arteries demonstrate no occlusion or stenosis. No evidence for aneurysm or arteriovenous malformation.      POSTERIOR CIRCULATION: The

## 2024-03-14 LAB
ALBUMIN SERPL-MCNC: 3.6 GM/DL (ref 3.4–5)
ALP BLD-CCNC: 70 IU/L (ref 40–128)
ALT SERPL-CCNC: 9 U/L (ref 10–40)
ANION GAP SERPL CALCULATED.3IONS-SCNC: 10 MMOL/L (ref 7–16)
AST SERPL-CCNC: 12 IU/L (ref 15–37)
BASE EXCESS MIXED: 7.8 (ref 0–3)
BASOPHILS ABSOLUTE: 0 K/CU MM
BASOPHILS RELATIVE PERCENT: 0.2 % (ref 0–1)
BILIRUB SERPL-MCNC: 0.8 MG/DL (ref 0–1)
BUN SERPL-MCNC: 10 MG/DL (ref 6–23)
CALCIUM SERPL-MCNC: 8.9 MG/DL (ref 8.3–10.6)
CHLORIDE BLD-SCNC: 95 MMOL/L (ref 99–110)
CO2: 30 MMOL/L (ref 21–32)
COMMENT: ABNORMAL
CREAT SERPL-MCNC: 0.6 MG/DL (ref 0.9–1.3)
CRP SERPL HS-MCNC: 12.2 MG/L
DIFFERENTIAL TYPE: ABNORMAL
EKG ATRIAL RATE: 113 BPM
EKG DIAGNOSIS: NORMAL
EKG P AXIS: 68 DEGREES
EKG P-R INTERVAL: 156 MS
EKG Q-T INTERVAL: 322 MS
EKG QRS DURATION: 72 MS
EKG QTC CALCULATION (BAZETT): 441 MS
EKG R AXIS: 55 DEGREES
EKG T AXIS: 26 DEGREES
EKG VENTRICULAR RATE: 113 BPM
EOSINOPHILS ABSOLUTE: 0 K/CU MM
EOSINOPHILS RELATIVE PERCENT: 0 % (ref 0–3)
GFR SERPL CREATININE-BSD FRML MDRD: >60 ML/MIN/1.73M2
GLUCOSE BLD-MCNC: 192 MG/DL (ref 70–99)
GLUCOSE BLD-MCNC: 195 MG/DL (ref 70–99)
GLUCOSE BLD-MCNC: 236 MG/DL (ref 70–99)
GLUCOSE BLD-MCNC: 297 MG/DL (ref 70–99)
GLUCOSE BLD-MCNC: 326 MG/DL (ref 70–99)
GLUCOSE SERPL-MCNC: 265 MG/DL (ref 70–99)
HCO3 VENOUS: 35.3 MMOL/L (ref 22–29)
HCT VFR BLD CALC: 45.8 % (ref 42–52)
HEMOGLOBIN: 14.3 GM/DL (ref 13.5–18)
IMMATURE NEUTROPHIL %: 0.4 % (ref 0–0.43)
LYMPHOCYTES ABSOLUTE: 0.7 K/CU MM
LYMPHOCYTES RELATIVE PERCENT: 6.4 % (ref 24–44)
MAGNESIUM: 2.1 MG/DL (ref 1.8–2.4)
MCH RBC QN AUTO: 26.6 PG (ref 27–31)
MCHC RBC AUTO-ENTMCNC: 31.2 % (ref 32–36)
MCV RBC AUTO: 85.1 FL (ref 78–100)
MONOCYTES ABSOLUTE: 0.8 K/CU MM
MONOCYTES RELATIVE PERCENT: 8 % (ref 0–4)
NUCLEATED RBC %: 0 %
O2 SAT, VEN: 88.8 % (ref 50–70)
PCO2, VEN: 61 MMHG (ref 41–51)
PDW BLD-RTO: 16.5 % (ref 11.7–14.9)
PH VENOUS: 7.37 (ref 7.32–7.43)
PLATELET # BLD: 255 K/CU MM (ref 140–440)
PMV BLD AUTO: 9.4 FL (ref 7.5–11.1)
PO2, VEN: 66 MMHG (ref 28–48)
POTASSIUM SERPL-SCNC: 4 MMOL/L (ref 3.5–5.1)
RBC # BLD: 5.38 M/CU MM (ref 4.6–6.2)
SEGMENTED NEUTROPHILS ABSOLUTE COUNT: 8.9 K/CU MM
SEGMENTED NEUTROPHILS RELATIVE PERCENT: 85 % (ref 36–66)
SODIUM BLD-SCNC: 135 MMOL/L (ref 135–145)
TOTAL IMMATURE NEUTOROPHIL: 0.04 K/CU MM
TOTAL NUCLEATED RBC: 0 K/CU MM
TOTAL PROTEIN: 6.4 GM/DL (ref 6.4–8.2)
WBC # BLD: 10.5 K/CU MM (ref 4–10.5)

## 2024-03-14 PROCEDURE — 2140000000 HC CCU INTERMEDIATE R&B

## 2024-03-14 PROCEDURE — 82805 BLOOD GASES W/O2 SATURATION: CPT

## 2024-03-14 PROCEDURE — 2700000000 HC OXYGEN THERAPY PER DAY

## 2024-03-14 PROCEDURE — 6360000002 HC RX W HCPCS: Performed by: INTERNAL MEDICINE

## 2024-03-14 PROCEDURE — 97530 THERAPEUTIC ACTIVITIES: CPT

## 2024-03-14 PROCEDURE — 82962 GLUCOSE BLOOD TEST: CPT

## 2024-03-14 PROCEDURE — 85025 COMPLETE CBC W/AUTO DIFF WBC: CPT

## 2024-03-14 PROCEDURE — 36415 COLL VENOUS BLD VENIPUNCTURE: CPT

## 2024-03-14 PROCEDURE — 80053 COMPREHEN METABOLIC PANEL: CPT

## 2024-03-14 PROCEDURE — 2580000003 HC RX 258: Performed by: INTERNAL MEDICINE

## 2024-03-14 PROCEDURE — 6370000000 HC RX 637 (ALT 250 FOR IP): Performed by: INTERNAL MEDICINE

## 2024-03-14 PROCEDURE — 94660 CPAP INITIATION&MGMT: CPT

## 2024-03-14 PROCEDURE — 86140 C-REACTIVE PROTEIN: CPT

## 2024-03-14 PROCEDURE — 94761 N-INVAS EAR/PLS OXIMETRY MLT: CPT

## 2024-03-14 PROCEDURE — 99232 SBSQ HOSP IP/OBS MODERATE 35: CPT | Performed by: INTERNAL MEDICINE

## 2024-03-14 PROCEDURE — 97162 PT EVAL MOD COMPLEX 30 MIN: CPT

## 2024-03-14 PROCEDURE — 84484 ASSAY OF TROPONIN QUANT: CPT

## 2024-03-14 PROCEDURE — 93010 ELECTROCARDIOGRAM REPORT: CPT | Performed by: INTERNAL MEDICINE

## 2024-03-14 PROCEDURE — 94640 AIRWAY INHALATION TREATMENT: CPT

## 2024-03-14 PROCEDURE — 2580000003 HC RX 258: Performed by: STUDENT IN AN ORGANIZED HEALTH CARE EDUCATION/TRAINING PROGRAM

## 2024-03-14 PROCEDURE — 83735 ASSAY OF MAGNESIUM: CPT

## 2024-03-14 RX ORDER — SODIUM CHLORIDE, SODIUM LACTATE, POTASSIUM CHLORIDE, AND CALCIUM CHLORIDE .6; .31; .03; .02 G/100ML; G/100ML; G/100ML; G/100ML
500 INJECTION, SOLUTION INTRAVENOUS ONCE
Status: COMPLETED | OUTPATIENT
Start: 2024-03-14 | End: 2024-03-14

## 2024-03-14 RX ADMIN — ENOXAPARIN SODIUM 30 MG: 100 INJECTION SUBCUTANEOUS at 09:02

## 2024-03-14 RX ADMIN — LOSARTAN POTASSIUM 25 MG: 25 TABLET, FILM COATED ORAL at 09:02

## 2024-03-14 RX ADMIN — ENOXAPARIN SODIUM 30 MG: 100 INJECTION SUBCUTANEOUS at 20:25

## 2024-03-14 RX ADMIN — VANCOMYCIN HYDROCHLORIDE 1500 MG: 1.5 INJECTION, POWDER, LYOPHILIZED, FOR SOLUTION INTRAVENOUS at 06:08

## 2024-03-14 RX ADMIN — AZITHROMYCIN DIHYDRATE 500 MG: 500 INJECTION, POWDER, LYOPHILIZED, FOR SOLUTION INTRAVENOUS at 06:05

## 2024-03-14 RX ADMIN — INSULIN LISPRO 4 UNITS: 100 INJECTION, SOLUTION INTRAVENOUS; SUBCUTANEOUS at 20:24

## 2024-03-14 RX ADMIN — SODIUM CHLORIDE, PRESERVATIVE FREE 10 ML: 5 INJECTION INTRAVENOUS at 09:02

## 2024-03-14 RX ADMIN — VANCOMYCIN HYDROCHLORIDE 1500 MG: 1.5 INJECTION, POWDER, LYOPHILIZED, FOR SOLUTION INTRAVENOUS at 17:27

## 2024-03-14 RX ADMIN — INSULIN LISPRO 2 UNITS: 100 INJECTION, SOLUTION INTRAVENOUS; SUBCUTANEOUS at 11:43

## 2024-03-14 RX ADMIN — IPRATROPIUM BROMIDE AND ALBUTEROL SULFATE 1 DOSE: .5; 2.5 SOLUTION RESPIRATORY (INHALATION) at 11:17

## 2024-03-14 RX ADMIN — PREDNISONE 40 MG: 20 TABLET ORAL at 09:02

## 2024-03-14 RX ADMIN — IPRATROPIUM BROMIDE AND ALBUTEROL SULFATE 1 DOSE: .5; 2.5 SOLUTION RESPIRATORY (INHALATION) at 21:05

## 2024-03-14 RX ADMIN — IPRATROPIUM BROMIDE AND ALBUTEROL SULFATE 1 DOSE: .5; 2.5 SOLUTION RESPIRATORY (INHALATION) at 07:15

## 2024-03-14 RX ADMIN — SODIUM CHLORIDE, POTASSIUM CHLORIDE, SODIUM LACTATE AND CALCIUM CHLORIDE 500 ML: 600; 310; 30; 20 INJECTION, SOLUTION INTRAVENOUS at 09:08

## 2024-03-14 RX ADMIN — INSULIN LISPRO 4 UNITS: 100 INJECTION, SOLUTION INTRAVENOUS; SUBCUTANEOUS at 17:35

## 2024-03-14 RX ADMIN — CEFEPIME 2000 MG: 2 INJECTION, POWDER, FOR SOLUTION INTRAVENOUS at 10:12

## 2024-03-14 RX ADMIN — IPRATROPIUM BROMIDE AND ALBUTEROL SULFATE 1 DOSE: .5; 2.5 SOLUTION RESPIRATORY (INHALATION) at 15:29

## 2024-03-14 RX ADMIN — CEFEPIME 2000 MG: 2 INJECTION, POWDER, FOR SOLUTION INTRAVENOUS at 01:51

## 2024-03-14 RX ADMIN — CEFEPIME 2000 MG: 2 INJECTION, POWDER, FOR SOLUTION INTRAVENOUS at 19:04

## 2024-03-14 NOTE — PLAN OF CARE
Problem: Discharge Planning  Goal: Discharge to home or other facility with appropriate resources  Outcome: Progressing     Problem: Safety - Adult  Goal: Free from fall injury  Outcome: Progressing     Problem: Pain  Goal: Verbalizes/displays adequate comfort level or baseline comfort level  Outcome: Progressing     Problem: Respiratory - Adult  Goal: Achieves optimal ventilation and oxygenation  Outcome: Progressing     Problem: Cardiovascular - Adult  Goal: Maintains optimal cardiac output and hemodynamic stability  Outcome: Progressing  Goal: Absence of cardiac dysrhythmias or at baseline  Outcome: Progressing     Problem: Infection - Adult  Goal: Absence of infection at discharge  Outcome: Progressing     Problem: Metabolic/Fluid and Electrolytes - Adult  Goal: Electrolytes maintained within normal limits  Outcome: Progressing  Goal: Glucose maintained within prescribed range  Outcome: Progressing

## 2024-03-14 NOTE — PROGRESS NOTES
Pulmonary and Critical Care  Progress Note      VITALS:  /75   Pulse 79   Temp 96.9 °F (36.1 °C) (Oral)   Resp 24   Ht 1.778 m (5' 10\")   Wt 98.4 kg (217 lb)   SpO2 95%   BMI 31.14 kg/m²     Subjective:   CHIEF COMPLAINT :SOB     HPI:                The patient is a 63 y.o. male is sitting in the bed. He is in mild resp distress. He was mildly hypotensive getting IV fluids and stable BP.    Objective:   PHYSICAL EXAM:    LUNGS:Occasional basal crackles  Abd-soft, BS+,NT  Ext- no pedal edema  CVS-s1s2, no murmurs      DATA:    CBC:  Recent Labs     03/13/24  0006 03/13/24  0637   WBC 7.6 7.7   RBC 5.99 5.76   HGB 15.9 15.1   HCT 52.3* 50.6    243   MCV 87.3 87.8   MCH 26.5* 26.2*   MCHC 30.4* 29.8*   RDW 16.8* 16.9*   SEGSPCT 65.3 94.3*      BMP:  Recent Labs     03/13/24  0006 03/13/24  0744 03/13/24  1025 03/13/24  1514   * 136  --  135   K 4.7 6.1* 5.5* 4.8   CL 93* 98*  --  94*   CO2 34* 31  --  31   BUN 10 10  --  11   CREATININE 0.8* 0.6*  --  0.7*   CALCIUM 8.5 8.2*  --  8.7   GLUCOSE 237* 285*  --  286*      ABG:  Recent Labs     03/13/24  0830   PH 7.28*   PO2ART 78*   SUT6GYV 70.0*   O2SAT 92.1*     BNP  No results found for: \"BNP\"   D-Dimer:  No results found for: \"DDIMER\"   Radiology: None      Assessment/Plan     Patient Active Problem List    Diagnosis Date Noted    Controlled type 2 diabetes mellitus without complication, without long-term current use of insulin ([facility]) 03/19/2023     Priority: Medium    Callus 03/19/2023     Priority: Medium    Hypoxia 03/19/2023     Priority: Medium    Epistaxis 03/19/2023     Priority: Medium    Acute on chronic respiratory failure with hypoxia and hypercapnia ([facility]) 03/13/2024    ANIVAL (obstructive sleep apnea) 03/01/2024    Hypersomnia 03/01/2024    Obesity (BMI 30-39.9) 03/01/2024    Diastolic heart failure (HCC) 02/13/2024    Primary hypertension 02/13/2024    Acute respiratory failure with hypoxia and hypercapnia (HCC) 02/01/2024    Chronic  respiratory failure with hypoxia and hypercapnia ([facility]) 01/12/2024    COPD exacerbation ([facility]) 01/07/2024    Lung nodule 04/16/2023    Acute exacerbation of chronic obstructive pulmonary disease (COPD) ([facility]) 03/23/2023    Restrictive lung disease 03/23/2023    Chronic obstructive pulmonary disease (HCC) 01/17/2022    Former smoker 01/17/2022    Testicular swelling, left 08/03/2021    Uncontrolled type 2 diabetes mellitus with hyperglycemia ([facility]) 08/03/2021    Continuous dependence on cigarette smoking 04/14/2020    Secondary polycythemia 04/14/2020    Shortness of breath 07/02/2018     Acute on Chronic Hypoxic Hypercapnic resp failure- improving  Chronic Metabolic Alkalosis  Bilateral Pneumonia  Chronic Right hemidiaphragm elevation  NSIP  Obesity  ? ANIVAL  COPD  Ex smoker  NIDDM  HTN  Hypotension likely sec to Hypovolemia- improving  Suspected Pulmonary HTN     Inhalers  Solumedrol  IV fluids  Keep MAP > 65 mmhg  Abx  F/u C&S  BIPAP qhs and prn  Keep sats > 92%  ICS  DVT and GI Prophylaxis  C/w present management    Electronically signed by Jose Ramon Gaming MD on 3/14/2024 at 8:44 AM

## 2024-03-14 NOTE — CONSULTS
[facility] ACUTE CARE PHYSICAL THERAPY EVALUATION  Jeromy Bojorquez, 1960, 3106/3106-A, 3/14/2024    History  Samish:  The primary encounter diagnosis was Acute respiratory failure with hypoxia and hypercapnia (HCC). A diagnosis of Sepsis due to pneumonia (HCC) was also pertinent to this visit.  Patient  has a past medical history of Acute exacerbation of chronic obstructive pulmonary disease (COPD) (HCC), Acute on chronic respiratory failure with hypoxemia (HCC), Arthritis, Cataract, Chronic hypoxemic respiratory failure (HCC), COPD (chronic obstructive pulmonary disease) (HCC), COPD, severe (HCC), Diabetes mellitus (HCC), Enlarged prostate, Former smoker, Hyperlipidemia, Hypertension, and Restrictive lung disease.  Patient  has a past surgical history that includes Colonoscopy; skin biopsy; and Tonsillectomy.    Discharge Recommendation: home with no additional skilled PT needs    Subjective:    Patient states:  \"I don't need physical therapy, that's not what I'm here for\".      Pain:  pt denied having any pain.      Communication with other providers:  Handoff to RN    Restrictions: contact precautions, general precautions, fall risk     Other: bed alarm declination form signed     Home Setup/Prior level of function  Social/Functional History  Lives With: Family (sister)  Type of Home: House  Home Layout: One level  Home Access: Stairs to enter without rails  Entrance Stairs - Number of Steps: 1  Bathroom Shower/Tub: Walk-in shower  Bathroom Equipment: Grab bars in shower  Home Equipment: Cane, Walker, rolling, Walker, 4 wheeled  Has the patient had two or more falls in the past year or any fall with injury in the past year?: No  ADL Assistance: Independent  Homemaking Assistance: Independent  Ambulation Assistance: Independent  Transfer Assistance: Independent  Active : Yes    Examination of body systems (includes body structures/functions, activity/participation  DPT  3/14/2024, 1:00 PM

## 2024-03-14 NOTE — CARE COORDINATION
CM in to see pt to initiate safe discharge plan. CM introduced self and explained the role of case management. Pt is alert, oriented, and cooperative. Pt lives with sister, Shazia Davis, in a 1-level home with 2 EMI of home. Pt is independent with all ADLs. Pt has a cane, rolling walker, grab bars in shower (walk-in shower) and home O2. Pt is an active . Pt prescriptions are paid for via his insurance. Pt has ins but no PCP. PCP list added to discharge instructions. Plan for D/C to home with family. Denies any needs. Notify CM if any D/C needs arise.     03/14/24 1526   Service Assessment   Patient Orientation Alert and Oriented   Cognition Alert   History Provided By Patient;Medical Record   Primary Caregiver Self   Accompanied By/Relationship N/A   Support Systems Family Members;Other (Comment)  (sister, Shazia Davis)   Patient's Healthcare Decision Maker is: Legal Next of Kin   PCP Verified by CM No  (PCP list added to D/C instructions)   Prior Functional Level Independent in ADLs/IADLs   Current Functional Level Assistance with the following:;Bathing;Toileting;Mobility  (required per hospital policy)   Can patient return to prior living arrangement Yes   Ability to make needs known: Good   Family able to assist with home care needs: Yes   Financial Resources Medicaid;Medicare   Community Resources None   Social/Functional History   Lives With   (sister)

## 2024-03-14 NOTE — DISCHARGE INSTRUCTIONS
Call to schedule follow up hospital follow up appointment:    [facility] [phone]  [address] Room [address]    [facility] [phone]  [address], [address]    [facility] [phone]   [address]     Call for new patient Primary Care Physician appointment:     Physician Finder [phone]    Dr. Perry and Dr. Lilly [phone]  [address] [place], [place]    Sandra Rich [phone]  [address] [place], [place]    Dr. Sapp and Devi Barnett [phone]  [address] [place], [place]    Dr. Judd and Nelda Camargo -[phone]  [address] [place], [place]    Lynda Miller [phone]  [address] [place], [place]    Magaly Reis CNP - [facility] [phone] [address] [place], [place]    *Private Pay ONLY - Membership Program*    Dr. Nguyen, El Bradshaw PA and Marcelo Stephens [phone]  [address], [address] [facility] (*Active Waiting List*) [phone]   [address] [place], [place]    Dr. Kim [phone]  [address] [place], [place]    Campos Maguire [phone]  [address] [place], [place]    Dr. Bustamante [phone]  [address], [address] [place], [place]    Dr. Perry and Dr. Briseno [phone]  [address], [address] [place], [place]    Dr. Flynn, Aileen Estrada PA and Brit Ramirez -[phone]  [address] [place] [address], [address] [place], [place]    Sheri Stephens PA, Jaswant Middleton PA and Melody Flowers [phone]  [address] [place], [place]    Dr. Tariq and Sheryl Mcdowell [phone] [address] [facility], [facility] [address] [place], [place]    Dr. Pfeiffer, Dr. Lopez, Danita Ross CNP, Janny Kehr CNP and Radha Holloway CNP   [phone]  [address]

## 2024-03-14 NOTE — PROGRESS NOTES
LEFT SUBCLAVIAN ARTERY: Normal. LEFT VERTEBRAL ARTERY: Normal. LEFT CAROTID ARTERY: Moderate atherosclerotic calcification is seen in the region of the carotid bifurcation and proximal left internal carotid artery with approximately 25% diameter stenosis. NECK SOFT TISSUES: No neck soft tissue mass or lymphadenopathy. VISUALIZED MEDIASTINUM: No mass or lymphadenopathy. VISUALIZED LUNG APICES: Extensive patchy airspace disease is seen in the upper lungs. This is most prominent in the right upper lobe where there is a prominent area of consolidation. BONES: No destructive osseous process. OTHER: None.     Since the patchy bilateral airspace disease. Atherosclerotic changes noted in the carotid bifurcation bilaterally with mural calcination seen without flow significant stenosis seen. PROCEDURE: CT ANGIOGRAPHY HEAD WITH/WITHOUT CONTRAST INDICATION: gait ataxia COMPARISON: none TECHNIQUE: Axial CT imaging obtained through the head prior to and following administration of IV contrast. Axial images, multiplanar reformatted images, and maximum intensity projection images were reviewed for CT angiographic technique. IV contrast: 75 mL Isovue-370. FINDINGS: ANTERIOR CIRCULATION: The intracranial internal carotid arteries, anterior cerebral arteries, and middle cerebral arteries demonstrate no occlusion or stenosis. No evidence for aneurysm or arteriovenous malformation. POSTERIOR CIRCULATION: The bilateral vertebral arteries, basilar artery and posterior cerebral arteries demonstrate no occlusion or stenosis. No evidence for aneurysm or arteriovenous malformation. INTRACRANIAL VENOUS SYSTEM: No evidence for intracranial venous thrombosis. Refer to noncontrast CT of the head for additional results.. IMPRESSION: No significant intracranial vascular pathology identified. Electronically signed by Felipe Porras MD    CT HEAD WO CONTRAST    Result Date: 3/13/2024  CT HEAD WITHOUT CONTRAST HISTORY: Dizziness COMPARISON: None    Component Value Date/Time    NITRU NEGATIVE 02/01/2024 08:54 PM    COLORU YELLOW 02/01/2024 08:54 PM    WBCUA 1 02/01/2024 08:54 PM    RBCUA 2 02/01/2024 08:54 PM    MUCUS FEW 02/01/2024 08:54 PM    TRICHOMONAS NONE SEEN 02/01/2024 08:54 PM    BACTERIA NEGATIVE 02/01/2024 08:54 PM    CLARITYU CLEAR 02/01/2024 08:54 PM    SPECGRAV >1.030 02/01/2024 08:54 PM    LEUKOCYTESUR NEGATIVE 02/01/2024 08:54 PM    UROBILINOGEN 1.0 02/01/2024 08:54 PM    BILIRUBINUR MODERATE NUMBER OR AMOUNT OBSERVED 02/01/2024 08:54 PM    BLOODU NEGATIVE 02/01/2024 08:54 PM    KETUA 15 02/01/2024 08:54 PM     Urine Cultures: No results found for: \"LABURIN\"  Blood Cultures: No results found for: \"BC\"  No results found for: \"BLOODCULT2\"  Organism: No results found for: \"ORG\"      Electronically signed by Víctor Kwon MD on 3/14/2024 at 8:30 AM

## 2024-03-15 LAB
ALBUMIN SERPL-MCNC: 3.5 GM/DL (ref 3.4–5)
ALP BLD-CCNC: 63 IU/L (ref 40–128)
ALT SERPL-CCNC: 8 U/L (ref 10–40)
ANION GAP SERPL CALCULATED.3IONS-SCNC: 5 MMOL/L (ref 7–16)
AST SERPL-CCNC: 11 IU/L (ref 15–37)
BASE EXCESS MIXED: 7.4 (ref 0–3)
BASOPHILS ABSOLUTE: 0 K/CU MM
BASOPHILS RELATIVE PERCENT: 0.1 % (ref 0–1)
BILIRUB SERPL-MCNC: 0.7 MG/DL (ref 0–1)
BUN SERPL-MCNC: 11 MG/DL (ref 6–23)
CALCIUM SERPL-MCNC: 9.2 MG/DL (ref 8.3–10.6)
CHLORIDE BLD-SCNC: 96 MMOL/L (ref 99–110)
CO2: 36 MMOL/L (ref 21–32)
COMMENT: ABNORMAL
CREAT SERPL-MCNC: 0.7 MG/DL (ref 0.9–1.3)
DIFFERENTIAL TYPE: ABNORMAL
DOSE AMOUNT: NORMAL
DOSE TIME: NORMAL
EOSINOPHILS ABSOLUTE: 0 K/CU MM
EOSINOPHILS RELATIVE PERCENT: 0 % (ref 0–3)
GFR SERPL CREATININE-BSD FRML MDRD: >60 ML/MIN/1.73M2
GLUCOSE BLD-MCNC: 167 MG/DL (ref 70–99)
GLUCOSE BLD-MCNC: 194 MG/DL (ref 70–99)
GLUCOSE BLD-MCNC: 261 MG/DL (ref 70–99)
GLUCOSE BLD-MCNC: 345 MG/DL (ref 70–99)
GLUCOSE SERPL-MCNC: 182 MG/DL (ref 70–99)
HCO3 VENOUS: 35.9 MMOL/L (ref 22–29)
HCT VFR BLD CALC: 45.9 % (ref 42–52)
HEMOGLOBIN: 14.1 GM/DL (ref 13.5–18)
IMMATURE NEUTROPHIL %: 0.5 % (ref 0–0.43)
LYMPHOCYTES ABSOLUTE: 0.8 K/CU MM
LYMPHOCYTES RELATIVE PERCENT: 9.6 % (ref 24–44)
MAGNESIUM: 2.2 MG/DL (ref 1.8–2.4)
MCH RBC QN AUTO: 26.5 PG (ref 27–31)
MCHC RBC AUTO-ENTMCNC: 30.7 % (ref 32–36)
MCV RBC AUTO: 86.1 FL (ref 78–100)
MONOCYTES ABSOLUTE: 0.8 K/CU MM
MONOCYTES RELATIVE PERCENT: 9 % (ref 0–4)
NUCLEATED RBC %: 0 %
O2 SAT, VEN: 93.5 % (ref 50–70)
PCO2, VEN: 68 MMHG (ref 41–51)
PDW BLD-RTO: 16.5 % (ref 11.7–14.9)
PH VENOUS: 7.33 (ref 7.32–7.43)
PLATELET # BLD: 226 K/CU MM (ref 140–440)
PMV BLD AUTO: 9 FL (ref 7.5–11.1)
PO2, VEN: 102 MMHG (ref 28–48)
POTASSIUM SERPL-SCNC: 4.7 MMOL/L (ref 3.5–5.1)
RBC # BLD: 5.33 M/CU MM (ref 4.6–6.2)
SEGMENTED NEUTROPHILS ABSOLUTE COUNT: 6.9 K/CU MM
SEGMENTED NEUTROPHILS RELATIVE PERCENT: 80.8 % (ref 36–66)
SODIUM BLD-SCNC: 137 MMOL/L (ref 135–145)
TOTAL IMMATURE NEUTOROPHIL: 0.04 K/CU MM
TOTAL NUCLEATED RBC: 0 K/CU MM
TOTAL PROTEIN: 6.1 GM/DL (ref 6.4–8.2)
VANCOMYCIN TROUGH: 13.2 UG/ML (ref 10–20)
WBC # BLD: 8.5 K/CU MM (ref 4–10.5)

## 2024-03-15 PROCEDURE — 2580000003 HC RX 258: Performed by: EMERGENCY MEDICINE

## 2024-03-15 PROCEDURE — 36415 COLL VENOUS BLD VENIPUNCTURE: CPT

## 2024-03-15 PROCEDURE — 2580000003 HC RX 258: Performed by: INTERNAL MEDICINE

## 2024-03-15 PROCEDURE — 99232 SBSQ HOSP IP/OBS MODERATE 35: CPT | Performed by: INTERNAL MEDICINE

## 2024-03-15 PROCEDURE — 80053 COMPREHEN METABOLIC PANEL: CPT

## 2024-03-15 PROCEDURE — 82962 GLUCOSE BLOOD TEST: CPT

## 2024-03-15 PROCEDURE — 2700000000 HC OXYGEN THERAPY PER DAY

## 2024-03-15 PROCEDURE — 94761 N-INVAS EAR/PLS OXIMETRY MLT: CPT

## 2024-03-15 PROCEDURE — 80202 ASSAY OF VANCOMYCIN: CPT

## 2024-03-15 PROCEDURE — 94640 AIRWAY INHALATION TREATMENT: CPT

## 2024-03-15 PROCEDURE — 6370000000 HC RX 637 (ALT 250 FOR IP): Performed by: INTERNAL MEDICINE

## 2024-03-15 PROCEDURE — 82805 BLOOD GASES W/O2 SATURATION: CPT

## 2024-03-15 PROCEDURE — 2140000000 HC CCU INTERMEDIATE R&B

## 2024-03-15 PROCEDURE — 6360000002 HC RX W HCPCS: Performed by: INTERNAL MEDICINE

## 2024-03-15 PROCEDURE — 6360000004 HC RX CONTRAST MEDICATION: Performed by: EMERGENCY MEDICINE

## 2024-03-15 PROCEDURE — 83735 ASSAY OF MAGNESIUM: CPT

## 2024-03-15 PROCEDURE — 85025 COMPLETE CBC W/AUTO DIFF WBC: CPT

## 2024-03-15 RX ADMIN — CEFEPIME 2000 MG: 2 INJECTION, POWDER, FOR SOLUTION INTRAVENOUS at 10:21

## 2024-03-15 RX ADMIN — INSULIN LISPRO 6 UNITS: 100 INJECTION, SOLUTION INTRAVENOUS; SUBCUTANEOUS at 16:44

## 2024-03-15 RX ADMIN — SODIUM CHLORIDE, PRESERVATIVE FREE 10 ML: 5 INJECTION INTRAVENOUS at 08:43

## 2024-03-15 RX ADMIN — AZITHROMYCIN DIHYDRATE 500 MG: 500 INJECTION, POWDER, LYOPHILIZED, FOR SOLUTION INTRAVENOUS at 06:49

## 2024-03-15 RX ADMIN — PREDNISONE 40 MG: 20 TABLET ORAL at 08:42

## 2024-03-15 RX ADMIN — ACETAMINOPHEN 650 MG: 325 TABLET ORAL at 16:10

## 2024-03-15 RX ADMIN — LOSARTAN POTASSIUM 25 MG: 25 TABLET, FILM COATED ORAL at 08:42

## 2024-03-15 RX ADMIN — ENOXAPARIN SODIUM 30 MG: 100 INJECTION SUBCUTANEOUS at 08:42

## 2024-03-15 RX ADMIN — CEFEPIME 2000 MG: 2 INJECTION, POWDER, FOR SOLUTION INTRAVENOUS at 02:46

## 2024-03-15 RX ADMIN — IPRATROPIUM BROMIDE AND ALBUTEROL SULFATE 1 DOSE: .5; 2.5 SOLUTION RESPIRATORY (INHALATION) at 11:18

## 2024-03-15 RX ADMIN — VANCOMYCIN HYDROCHLORIDE 1500 MG: 1.5 INJECTION, POWDER, LYOPHILIZED, FOR SOLUTION INTRAVENOUS at 06:42

## 2024-03-15 RX ADMIN — IOPAMIDOL 75 ML: 755 INJECTION, SOLUTION INTRAVENOUS at 07:39

## 2024-03-15 RX ADMIN — VANCOMYCIN HYDROCHLORIDE 1500 MG: 1.5 INJECTION, POWDER, LYOPHILIZED, FOR SOLUTION INTRAVENOUS at 22:37

## 2024-03-15 RX ADMIN — IPRATROPIUM BROMIDE AND ALBUTEROL SULFATE 1 DOSE: .5; 2.5 SOLUTION RESPIRATORY (INHALATION) at 22:16

## 2024-03-15 RX ADMIN — IPRATROPIUM BROMIDE AND ALBUTEROL SULFATE 1 DOSE: .5; 2.5 SOLUTION RESPIRATORY (INHALATION) at 07:13

## 2024-03-15 RX ADMIN — SODIUM CHLORIDE, PRESERVATIVE FREE 10 ML: 5 INJECTION INTRAVENOUS at 20:32

## 2024-03-15 RX ADMIN — IOPAMIDOL 75 ML: 755 INJECTION, SOLUTION INTRAVENOUS at 07:38

## 2024-03-15 RX ADMIN — CEFEPIME 2000 MG: 2 INJECTION, POWDER, FOR SOLUTION INTRAVENOUS at 16:49

## 2024-03-15 RX ADMIN — ENOXAPARIN SODIUM 30 MG: 100 INJECTION SUBCUTANEOUS at 20:31

## 2024-03-15 RX ADMIN — IPRATROPIUM BROMIDE AND ALBUTEROL SULFATE 1 DOSE: .5; 2.5 SOLUTION RESPIRATORY (INHALATION) at 15:14

## 2024-03-15 ASSESSMENT — PAIN SCALES - WONG BAKER: WONGBAKER_NUMERICALRESPONSE: HURTS A LITTLE BIT

## 2024-03-15 ASSESSMENT — PAIN DESCRIPTION - ORIENTATION: ORIENTATION: ANTERIOR;MID

## 2024-03-15 ASSESSMENT — PAIN SCALES - GENERAL
PAINLEVEL_OUTOF10: 0
PAINLEVEL_OUTOF10: 6

## 2024-03-15 ASSESSMENT — PAIN - FUNCTIONAL ASSESSMENT: PAIN_FUNCTIONAL_ASSESSMENT: ACTIVITIES ARE NOT PREVENTED

## 2024-03-15 ASSESSMENT — PAIN DESCRIPTION - DESCRIPTORS: DESCRIPTORS: ACHING

## 2024-03-15 ASSESSMENT — PAIN DESCRIPTION - LOCATION: LOCATION: HEAD

## 2024-03-15 NOTE — PROGRESS NOTES
Pulmonary and Critical Care  Progress Note      VITALS:  BP (!) 104/57   Pulse 82   Temp 98 °F (36.7 °C) (Oral)   Resp 21   Ht 1.778 m (5' 10\")   Wt 102.1 kg (225 lb)   SpO2 95%   BMI 32.28 kg/m²     Subjective:   CHIEF COMPLAINT :SOB     HPI:                The patient is a 63 y.o. male is sitting in the bed. He is in mild resp distress    Objective:   PHYSICAL EXAM:    LUNGS:Occasional basal crackles  Abd-soft, BS+,NT  Ext- no pedal edema  CVS-s1s2, no murmurs     DATA:    CBC:  Recent Labs     03/13/24  0637 03/14/24  1115 03/15/24  0214   WBC 7.7 10.5 8.5   RBC 5.76 5.38 5.33   HGB 15.1 14.3 14.1   HCT 50.6 45.8 45.9    255 226   MCV 87.8 85.1 86.1   MCH 26.2* 26.6* 26.5*   MCHC 29.8* 31.2* 30.7*   RDW 16.9* 16.5* 16.5*   SEGSPCT 94.3* 85.0* 80.8*      BMP:  Recent Labs     03/13/24  1514 03/14/24  1115 03/15/24  0214    135 137   K 4.8 4.0 4.7   CL 94* 95* 96*   CO2 31 30 36*   BUN 11 10 11   CREATININE 0.7* 0.6* 0.7*   CALCIUM 8.7 8.9 9.2   GLUCOSE 286* 265* 182*      ABG:  Recent Labs     03/13/24  0830   PH 7.28*   PO2ART 78*   MSI1QOO 70.0*   O2SAT 92.1*     BNP  No results found for: \"BNP\"   D-Dimer:  No results found for: \"DDIMER\"   Radiology: None      Assessment/Plan     Patient Active Problem List    Diagnosis Date Noted    Controlled type 2 diabetes mellitus without complication, without long-term current use of insulin ([facility]) 03/19/2023     Priority: Medium    Callus 03/19/2023     Priority: Medium    Hypoxia 03/19/2023     Priority: Medium    Epistaxis 03/19/2023     Priority: Medium    Acute on chronic respiratory failure with hypoxia and hypercapnia ([facility]) 03/13/2024    ANIVAL (obstructive sleep apnea) 03/01/2024    Hypersomnia 03/01/2024    Obesity (BMI 30-39.9) 03/01/2024    Diastolic heart failure (HCC) 02/13/2024    Primary hypertension 02/13/2024    Acute respiratory failure with hypoxia and hypercapnia (HCC) 02/01/2024    Chronic respiratory failure with hypoxia and

## 2024-03-15 NOTE — PROGRESS NOTES
PHARMACY VANCOMYCIN MONITORING SERVICE  Pharmacy consulted by Dr. Patel for monitoring and adjustment.    Indication for treatment: Vancomycin indication: HAP  Goal trough: Trough Goal: 15-20 mcg/mL  AUC/MARIALUISA: 400-600    Risk Factors for MRSA Identified:   Hospitalization within the past 90 days, Received IV antibiotics within the past 90 days    Pertinent Laboratory Values:   Temp Readings from Last 3 Encounters:   03/15/24 98 °F (36.7 °C) (Oral)   02/06/24 97.9 °F (36.6 °C) (Oral)   01/09/24 98.2 °F (36.8 °C) (Oral)     Recent Labs     03/13/24  0637 03/14/24  1115 03/15/24  0214   WBC 7.7 10.5 8.5     Recent Labs     03/13/24  1514 03/14/24  1115 03/15/24  0214   BUN 11 10 11   CREATININE 0.7* 0.6* 0.7*     Estimated Creatinine Clearance: 131 mL/min (A) (based on SCr of 0.7 mg/dL (L)).    Intake/Output Summary (Last 24 hours) at 3/15/2024 1058  Last data filed at 3/15/2024 0843  Gross per 24 hour   Intake 480 ml   Output 1675 ml   Net -1195 ml     Last Encounter Weight:  Wt Readings from Last 3 Encounters:   03/14/24 102.1 kg (225 lb)   03/01/24 102.5 kg (226 lb)   02/13/24 101.2 kg (223 lb)     Pertinent Cultures:   Date    Source    Results  03/13   Blood    NG at 48 hours  03/13   Strep Pneumo   Negative  03/13   Legionella   Negative  03/13   Respiratory   Sent  03/13   MRSA Screen ([place]) POSITIVE    Assessment:  HPI: 63 y.o. male with history of COPD, chronic respiratory failure on home oxygen, and DM type II.  Patient has had recurrent admissions for COPD exacerbation.  Patient brought to ED as a stroke alert and was admitted with respiratory failure and pneumonia.    Renal Function:  Scr 0.6 mg/dL today.   BUN WNL  Urine output not well documented  Day(s) of therapy: 3 of 7  Vancomycin concentration:   03/15 - 13.2mg/L ~ 9h post dose    Plan:  Will continue current dose of vancomycin 1500 mg IV q12 hours. This dose predicts a ssAUC of 511 mg/L*hr and ssTrough of 13.4 mg/L.  Will check a level tomorrow

## 2024-03-15 NOTE — PROGRESS NOTES
V2.0    [facility] Progress Note      Name:  Jeromy Bojorquez /Age/Sex: 1960  (63 y.o. male)   MRN & CSN:  [id number] & [id number] Encounter Date/Time: 3/15/2024 8:30 AM EDT   [facility] PCP: No primary care provider on file.     Attending:Víctor Kwon MD       Hospital Day: 4    Assessment and Recommendations   Jeromy Bojorquez is a 63 y.o. male with pmh of  who presents with Acute on chronic respiratory failure with hypoxia and hypercapnia (HCC)      Plan:     #.  Acute on chronic respiratory failure with hypoxia and hypercapnia  -VBG-pH 7.26, pCO2 86, pO2 82, HCO3 38.6.  -Chest x-ray-mild left basilar airspace disease  -CTA chest-patchy bilateral airspace disease, elevated right hemidiaphragm.  -Patient currently on BiPAP-continue and wean as tolerated.  -Repeat VBG.  -Consult pulmonology.     #.  Pneumonia  -Patient had hospital admission 2024, 2024 and was treated with levofloxacin, ceftriaxone, azithromycin  -Continue broad-spectrum antibiotics-vancomycin, cefepime.  -Urine Streptococcus pneumonia antigen, Legionella antigen, sputum culture.  -Check procalcitonin.    #Hyperkalemia:  -pt's K showing above 6 0n 3/14 , w hemolysed sample, repeat K 5.5, pt s/p hyperkalemia treament w Insulin/ D50/ Lokelma x 1  with improvement to 4.8  -Low K diet     #.  Patient met sepsis criteria given his tachycardia and tachypnea which could be secondary to COPD exacerbation  -Patient has no leukocytosis, lactic acid 1, patient afebrile.  -Blood cultures drawn in ED.  -Patient received IV fluids as per sepsis protocol, got broad-spectrum antibiotics.     #.  Bilateral lower extremity weakness  -There was a stroke alert initially.  -CT head, CTA head and neck-no acute process.  -ED physician discussed with [facility] stroke team and felt patient was not a TNK candidate, overall presentation was likely secondary to hypercapnic respiratory failure.  -PT/OT evaluation.  -If suspicion for CVA high on  08:54 PM    COLORU YELLOW 02/01/2024 08:54 PM    WBCUA 1 02/01/2024 08:54 PM    RBCUA 2 02/01/2024 08:54 PM    MUCUS FEW 02/01/2024 08:54 PM    TRICHOMONAS NONE SEEN 02/01/2024 08:54 PM    BACTERIA NEGATIVE 02/01/2024 08:54 PM    CLARITYU CLEAR 02/01/2024 08:54 PM    SPECGRAV >1.030 02/01/2024 08:54 PM    LEUKOCYTESUR NEGATIVE 02/01/2024 08:54 PM    UROBILINOGEN 1.0 02/01/2024 08:54 PM    BILIRUBINUR MODERATE NUMBER OR AMOUNT OBSERVED 02/01/2024 08:54 PM    BLOODU NEGATIVE 02/01/2024 08:54 PM    KETUA 15 02/01/2024 08:54 PM     Urine Cultures: No results found for: \"LABURIN\"  Blood Cultures: No results found for: \"BC\"  No results found for: \"BLOODCULT2\"  Organism: No results found for: \"ORG\"      Electronically signed by Víctor Kwon MD on 3/15/2024 at 11:51 AM

## 2024-03-16 LAB
ALBUMIN SERPL-MCNC: 3.3 GM/DL (ref 3.4–5)
ALP BLD-CCNC: 54 IU/L (ref 40–129)
ALT SERPL-CCNC: 11 U/L (ref 10–40)
ANION GAP SERPL CALCULATED.3IONS-SCNC: 7 MMOL/L (ref 7–16)
AST SERPL-CCNC: 12 IU/L (ref 15–37)
BASOPHILS ABSOLUTE: 0 K/CU MM
BASOPHILS RELATIVE PERCENT: 0.1 % (ref 0–1)
BILIRUB SERPL-MCNC: 0.7 MG/DL (ref 0–1)
BUN SERPL-MCNC: 11 MG/DL (ref 6–23)
CALCIUM SERPL-MCNC: 8.6 MG/DL (ref 8.3–10.6)
CHLORIDE BLD-SCNC: 100 MMOL/L (ref 99–110)
CO2: 32 MMOL/L (ref 21–32)
CREAT SERPL-MCNC: 0.6 MG/DL (ref 0.9–1.3)
DIFFERENTIAL TYPE: ABNORMAL
EOSINOPHILS ABSOLUTE: 0 K/CU MM
EOSINOPHILS RELATIVE PERCENT: 0 % (ref 0–3)
GFR SERPL CREATININE-BSD FRML MDRD: >60 ML/MIN/1.73M2
GLUCOSE BLD-MCNC: 178 MG/DL (ref 70–99)
GLUCOSE BLD-MCNC: 238 MG/DL (ref 70–99)
GLUCOSE BLD-MCNC: 270 MG/DL (ref 70–99)
GLUCOSE BLD-MCNC: 311 MG/DL (ref 70–99)
GLUCOSE SERPL-MCNC: 224 MG/DL (ref 70–99)
HCT VFR BLD CALC: 44.3 % (ref 42–52)
HEMOGLOBIN: 13.5 GM/DL (ref 13.5–18)
IMMATURE NEUTROPHIL %: 0.4 % (ref 0–0.43)
LYMPHOCYTES ABSOLUTE: 1 K/CU MM
LYMPHOCYTES RELATIVE PERCENT: 13.5 % (ref 24–44)
MAGNESIUM: 2.1 MG/DL (ref 1.8–2.4)
MCH RBC QN AUTO: 25.9 PG (ref 27–31)
MCHC RBC AUTO-ENTMCNC: 30.5 % (ref 32–36)
MCV RBC AUTO: 85 FL (ref 78–100)
MONOCYTES ABSOLUTE: 0.8 K/CU MM
MONOCYTES RELATIVE PERCENT: 10.9 % (ref 0–4)
NUCLEATED RBC %: 0 %
PDW BLD-RTO: 16.1 % (ref 11.7–14.9)
PLATELET # BLD: 205 K/CU MM (ref 140–440)
PMV BLD AUTO: 9.2 FL (ref 7.5–11.1)
POTASSIUM SERPL-SCNC: 4.2 MMOL/L (ref 3.5–5.1)
RBC # BLD: 5.21 M/CU MM (ref 4.6–6.2)
SEGMENTED NEUTROPHILS ABSOLUTE COUNT: 5.7 K/CU MM
SEGMENTED NEUTROPHILS RELATIVE PERCENT: 75.1 % (ref 36–66)
SODIUM BLD-SCNC: 139 MMOL/L (ref 135–145)
TOTAL IMMATURE NEUTOROPHIL: 0.03 K/CU MM
TOTAL NUCLEATED RBC: 0 K/CU MM
TOTAL PROTEIN: 5.7 GM/DL (ref 6.4–8.2)
WBC # BLD: 7.6 K/CU MM (ref 4–10.5)

## 2024-03-16 PROCEDURE — 6370000000 HC RX 637 (ALT 250 FOR IP): Performed by: INTERNAL MEDICINE

## 2024-03-16 PROCEDURE — 82962 GLUCOSE BLOOD TEST: CPT

## 2024-03-16 PROCEDURE — 2580000003 HC RX 258: Performed by: INTERNAL MEDICINE

## 2024-03-16 PROCEDURE — 99232 SBSQ HOSP IP/OBS MODERATE 35: CPT | Performed by: INTERNAL MEDICINE

## 2024-03-16 PROCEDURE — 2140000000 HC CCU INTERMEDIATE R&B

## 2024-03-16 PROCEDURE — 85025 COMPLETE CBC W/AUTO DIFF WBC: CPT

## 2024-03-16 PROCEDURE — 6360000002 HC RX W HCPCS: Performed by: INTERNAL MEDICINE

## 2024-03-16 PROCEDURE — 94640 AIRWAY INHALATION TREATMENT: CPT

## 2024-03-16 PROCEDURE — 94761 N-INVAS EAR/PLS OXIMETRY MLT: CPT

## 2024-03-16 PROCEDURE — 83735 ASSAY OF MAGNESIUM: CPT

## 2024-03-16 PROCEDURE — 36415 COLL VENOUS BLD VENIPUNCTURE: CPT

## 2024-03-16 PROCEDURE — 80053 COMPREHEN METABOLIC PANEL: CPT

## 2024-03-16 PROCEDURE — 94660 CPAP INITIATION&MGMT: CPT

## 2024-03-16 RX ORDER — IPRATROPIUM BROMIDE AND ALBUTEROL SULFATE 2.5; .5 MG/3ML; MG/3ML
1 SOLUTION RESPIRATORY (INHALATION) 2 TIMES DAILY
Status: DISCONTINUED | OUTPATIENT
Start: 2024-03-16 | End: 2024-03-20 | Stop reason: HOSPADM

## 2024-03-16 RX ORDER — SENNA AND DOCUSATE SODIUM 50; 8.6 MG/1; MG/1
2 TABLET, FILM COATED ORAL 2 TIMES DAILY PRN
Status: DISCONTINUED | OUTPATIENT
Start: 2024-03-16 | End: 2024-03-20 | Stop reason: HOSPADM

## 2024-03-16 RX ADMIN — ENOXAPARIN SODIUM 30 MG: 100 INJECTION SUBCUTANEOUS at 20:43

## 2024-03-16 RX ADMIN — SODIUM CHLORIDE, PRESERVATIVE FREE 10 ML: 5 INJECTION INTRAVENOUS at 08:05

## 2024-03-16 RX ADMIN — VANCOMYCIN HYDROCHLORIDE 1500 MG: 1.5 INJECTION, POWDER, LYOPHILIZED, FOR SOLUTION INTRAVENOUS at 08:05

## 2024-03-16 RX ADMIN — POLYETHYLENE GLYCOL 3350 17 G: 17 POWDER, FOR SOLUTION ORAL at 18:17

## 2024-03-16 RX ADMIN — CEFEPIME 2000 MG: 2 INJECTION, POWDER, FOR SOLUTION INTRAVENOUS at 09:49

## 2024-03-16 RX ADMIN — LOSARTAN POTASSIUM 25 MG: 25 TABLET, FILM COATED ORAL at 08:05

## 2024-03-16 RX ADMIN — CEFEPIME 2000 MG: 2 INJECTION, POWDER, FOR SOLUTION INTRAVENOUS at 20:36

## 2024-03-16 RX ADMIN — INSULIN LISPRO 6 UNITS: 100 INJECTION, SOLUTION INTRAVENOUS; SUBCUTANEOUS at 16:31

## 2024-03-16 RX ADMIN — IPRATROPIUM BROMIDE AND ALBUTEROL SULFATE 1 DOSE: 2.5; .5 SOLUTION RESPIRATORY (INHALATION) at 19:37

## 2024-03-16 RX ADMIN — IPRATROPIUM BROMIDE AND ALBUTEROL SULFATE 1 DOSE: .5; 2.5 SOLUTION RESPIRATORY (INHALATION) at 11:22

## 2024-03-16 RX ADMIN — VANCOMYCIN HYDROCHLORIDE 1500 MG: 1.5 INJECTION, POWDER, LYOPHILIZED, FOR SOLUTION INTRAVENOUS at 17:40

## 2024-03-16 RX ADMIN — ENOXAPARIN SODIUM 30 MG: 100 INJECTION SUBCUTANEOUS at 08:05

## 2024-03-16 RX ADMIN — CEFEPIME 2000 MG: 2 INJECTION, POWDER, FOR SOLUTION INTRAVENOUS at 02:55

## 2024-03-16 RX ADMIN — PREDNISONE 40 MG: 20 TABLET ORAL at 08:06

## 2024-03-16 RX ADMIN — INSULIN LISPRO 4 UNITS: 100 INJECTION, SOLUTION INTRAVENOUS; SUBCUTANEOUS at 12:07

## 2024-03-16 RX ADMIN — IPRATROPIUM BROMIDE AND ALBUTEROL SULFATE 1 DOSE: .5; 2.5 SOLUTION RESPIRATORY (INHALATION) at 07:15

## 2024-03-16 ASSESSMENT — PAIN SCALES - GENERAL
PAINLEVEL_OUTOF10: 0
PAINLEVEL_OUTOF10: 0

## 2024-03-16 NOTE — PROGRESS NOTES
03/16/24 0340   NIV Type   $NIV $Daily Charge   NIV Started/Stopped On   Equipment Type v60   Mode Bilevel   Mask Type Full face mask   Mask Size Medium   Bonnet size Medium   Assessment   Pulse 66   Respirations 16   SpO2 96 %   Comfort Level Good   Using Accessory Muscles No   Mask Compliance Good   Skin Protection for O2 Device No   Settings/Measurements   PIP Observed 16 cm H20   IPAP 15 cmH20   CPAP/EPAP 5 cmH2O   Vt (Measured) 912 mL   Rate Ordered 12   FiO2  30 %   I Time/ I Time % 1 s   Minute Volume (L/min) 13.7 Liters   Mask Leak (lpm) 40 lpm   Patient's Home Machine No   Alarm Settings   Alarms On Y   Patient Observation   Observations Pt is sleeping

## 2024-03-16 NOTE — PROGRESS NOTES
03/15/24 2229   NIV Type   NIV Started/Stopped On   Equipment Type v60   Mode Bilevel   Mask Type Full face mask   Mask Size Medium   Bonnet size Medium   Assessment   Pulse 74   Respirations 20   SpO2 96 %   Level of Consciousness 0   Comfort Level Good   Using Accessory Muscles No   Mask Compliance Good   Skin Protection for O2 Device N/A   Settings/Measurements   PIP Observed 16 cm H20   IPAP 15 cmH20   CPAP/EPAP 5 cmH2O   Vt (Measured) 999 mL   Rate Ordered 12   I Time/ I Time % 30 s   Minute Volume (L/min) 27.5 Liters   Mask Leak (lpm) 41 lpm   Patient's Home Machine No   Alarm Settings   Alarms On Y   Low Pressure (cmH2O) 5 cmH2O   High Pressure (cmH2O) 20 cmH2O   Delay Alarm 20 sec(s)   Apnea (secs) 20 secs   RR Low (bpm) 13   RR High (bpm) 40 br/min   Patient Observation   Observations Pt is resting in bed

## 2024-03-16 NOTE — PROGRESS NOTES
Pulmonary and Critical Care  Progress Note      VITALS:  /74   Pulse 76   Temp 98.1 °F (36.7 °C)   Resp 18   Ht 1.778 m (5' 10\")   Wt 102.1 kg (225 lb)   SpO2 92%   BMI 32.28 kg/m²     Subjective:   CHIEF COMPLAINT :SOB     HPI:                The patient is a 63 y.o. male is sleepy but arousable.He is in mild resp distress    Objective:   PHYSICAL EXAM:    LUNGS:Occasional basal crackles  Abd-soft, BS+,NT  Ext- no pedal edema  CVS-s1s2, no murmurs       DATA:    CBC:  Recent Labs     03/14/24  1115 03/15/24  0214 03/16/24  0257   WBC 10.5 8.5 7.6   RBC 5.38 5.33 5.21   HGB 14.3 14.1 13.5   HCT 45.8 45.9 44.3    226 205   MCV 85.1 86.1 85.0   MCH 26.6* 26.5* 25.9*   MCHC 31.2* 30.7* 30.5*   RDW 16.5* 16.5* 16.1*   SEGSPCT 85.0* 80.8* 75.1*      BMP:  Recent Labs     03/14/24  1115 03/15/24  0214 03/16/24  0257    137 139   K 4.0 4.7 4.2   CL 95* 96* 100   CO2 30 36* 32   BUN 10 11 11   CREATININE 0.6* 0.7* 0.6*   CALCIUM 8.9 9.2 8.6   GLUCOSE 265* 182* 224*      ABG:  No results for input(s): \"PH\", \"PO2ART\", \"KKD6AXB\", \"HCO3\", \"BEART\", \"O2SAT\" in the last 72 hours.  BNP  No results found for: \"BNP\"   D-Dimer:  No results found for: \"DDIMER\"   Radiology: None      Assessment/Plan     Patient Active Problem List    Diagnosis Date Noted    Controlled type 2 diabetes mellitus without complication, without long-term current use of insulin (HCC) 03/19/2023     Priority: Medium    Callus 03/19/2023     Priority: Medium    Hypoxia 03/19/2023     Priority: Medium    Epistaxis 03/19/2023     Priority: Medium    Acute on chronic respiratory failure with hypoxia and hypercapnia (HCC) 03/13/2024    ANIVAL (obstructive sleep apnea) 03/01/2024    Hypersomnia 03/01/2024    Obesity (BMI 30-39.9) 03/01/2024    Diastolic heart failure (HCC) 02/13/2024    Primary hypertension 02/13/2024    Acute respiratory failure with hypoxia and hypercapnia ([facility]) 02/01/2024    Chronic respiratory failure with hypoxia and

## 2024-03-16 NOTE — PROGRESS NOTES
PHARMACY VANCOMYCIN MONITORING SERVICE  Pharmacy consulted by Dr. Patel for monitoring and adjustment.    Indication for treatment: Vancomycin indication: HAP  Goal trough: Trough Goal: 15-20 mcg/mL  AUC/MARIALUISA: 400-600    Risk Factors for MRSA Identified:   Hospitalization within the past 90 days, Received IV antibiotics within the past 90 days    Pertinent Laboratory Values:   Temp Readings from Last 3 Encounters:   03/16/24 98.1 °F (36.7 °C)   02/06/24 97.9 °F (36.6 °C) (Oral)   01/09/24 98.2 °F (36.8 °C) (Oral)     Recent Labs     03/14/24  1115 03/15/24  0214 03/16/24  0257   WBC 10.5 8.5 7.6     Recent Labs     03/14/24  1115 03/15/24  0214 03/16/24  0257   BUN 10 11 11   CREATININE 0.6* 0.7* 0.6*     Estimated Creatinine Clearance: 153 mL/min (A) (based on SCr of 0.6 mg/dL (L)).    Intake/Output Summary (Last 24 hours) at 3/16/2024 1243  Last data filed at 3/16/2024 0926  Gross per 24 hour   Intake 120 ml   Output 1350 ml   Net -1230 ml     Last Encounter Weight:  Wt Readings from Last 3 Encounters:   03/14/24 102.1 kg (225 lb)   03/01/24 102.5 kg (226 lb)   02/13/24 101.2 kg (223 lb)     Pertinent Cultures:   Date    Source    Results  03/13/24  Blood    NGTD at 72 hours  03/13/24  Strep pneumo   Negative  03/13/24  Legionella   Negative  03/13/24  Respiratory Panel  Negative  03/13/24  MRSA Screen (Nasal) POSITIVE    Assessment:  HPI: 63 y.o. male with history of COPD, chronic respiratory failure on home oxygen, and DM type II. Patient has had recurrent admissions for COPD exacerbation. Patient brought to ED as a stroke alert and was admitted with respiratory failure and pneumonia.    Renal Function:  SCr stable and within normal limits  BUN within normal limits  Urine output not well documented - appears to be adequate  Day(s) of therapy: 4 of 7  Vancomycin concentration:   03/15 - 13.2mg/L ~ 9h post dose    Plan:  Continue vancomycin 1500 mg IV every 12 hours  Predicted  mg/L*h and trough 11.1  mg/L at steady state  Pharmacy will continue to monitor patient and adjust therapy as indicated    VANCOMYCIN CONCENTRATION SCHEDULED FOR 03/18/2024 @ 0600    Thank you for the consult.  Peggy Avila RPH, PharmD, BCPS  3/16/2024 12:43 PM

## 2024-03-16 NOTE — RT PROTOCOL NOTE
RT Inhaler-Nebulizer Bronchodilator Protocol Note    There is a bronchodilator order in the chart from a provider indicating to follow the RT Bronchodilator Protocol and there is an “Initiate RT Inhaler-Nebulizer Bronchodilator Protocol” order as well (see protocol at bottom of note).    CXR Findings:  No results found.    The findings from the last RT Protocol Assessment were as follows:   History Pulmonary Disease: Chronic pulmonary disease  Respiratory Pattern: Regular pattern and RR 12-20 bpm  Breath Sounds: Slightly diminished and/or crackles  Cough: Strong, spontaneous, non-productive  Indication for Bronchodilator Therapy: On home bronchodilatorsPRN  Bronchodilator Assessment Score: 4    Aerosolized bronchodilator medication orders have been revised according to the RT Inhaler-Nebulizer Bronchodilator Protocol below.    Respiratory Therapist to perform RT Therapy Protocol Assessment initially then follow the protocol.  Repeat RT Therapy Protocol Assessment PRN for score 0-3 or on second treatment, BID, and PRN for scores above 3.    No Indications - adjust the frequency to every 6 hours PRN wheezing or bronchospasm, if no treatments needed after 48 hours then discontinue using Per Protocol order mode.     If indication present, adjust the RT bronchodilator orders based on the Bronchodilator Assessment Score as indicated below.  Use Inhaler orders unless patient has one or more of the following: on home nebulizer, not able to hold breath for 10 seconds, is not alert and oriented, cannot activate and use MDI correctly, or respiratory rate 25 breaths per minute or more, then use the equivalent nebulizer order(s) with same Frequency and PRN reasons based on the score.  If a patient is on this medication at home then do not decrease Frequency below that used at home.    0-3 - enter or revise RT bronchodilator order(s) to equivalent RT Bronchodilator order with Frequency of every 4 hours PRN for wheezing or  increased work of breathing using Per Protocol order mode.        4-6 - enter or revise RT Bronchodilator order(s) to two equivalent RT bronchodilator orders with one order with BID Frequency and one order with Frequency of every 4 hours PRN wheezing or increased work of breathing using Per Protocol order mode.        7-10 - enter or revise RT Bronchodilator order(s) to two equivalent RT bronchodilator orders with one order with TID Frequency and one order with Frequency of every 4 hours PRN wheezing or increased work of breathing using Per Protocol order mode.       11-13 - enter or revise RT Bronchodilator order(s) to one equivalent RT bronchodilator order with QID Frequency and an Albuterol order with Frequency of every 4 hours PRN wheezing or increased work of breathing using Per Protocol order mode.      Greater than 13 - enter or revise RT Bronchodilator order(s) to one equivalent RT bronchodilator order with every 4 hours Frequency and an Albuterol order with Frequency of every 2 hours PRN wheezing or increased work of breathing using Per Protocol order mode.     RT to enter RT Home Evaluation for COPD & MDI Assessment order using Per Protocol order mode.    Electronically signed by Sheryl Truong RCP on 3/16/2024 at 12:50 PM

## 2024-03-16 NOTE — PROGRESS NOTES
Occupational Therapy  Per the physical rehabilitation process, the OT order will be discontinued. Current charting does not demonstrate need for skilled therapy services. PT was evaluated by PT and determined to be independent and safe to return home once medically stable. Please re-order if new mobility or self care impairments arise.   Seema Palafox, OTR/L, MOT   [id number]   2:56 PM, 3/16/2024

## 2024-03-17 ENCOUNTER — APPOINTMENT (OUTPATIENT)
Dept: GENERAL RADIOLOGY | Age: 64
DRG: 177 | End: 2024-03-17
Payer: MEDICARE

## 2024-03-17 LAB
ALBUMIN SERPL-MCNC: 3.5 GM/DL (ref 3.4–5)
ALP BLD-CCNC: 54 IU/L (ref 40–129)
ALT SERPL-CCNC: 16 U/L (ref 10–40)
ANION GAP SERPL CALCULATED.3IONS-SCNC: 7 MMOL/L (ref 7–16)
AST SERPL-CCNC: 14 IU/L (ref 15–37)
BASOPHILS ABSOLUTE: 0 K/CU MM
BASOPHILS RELATIVE PERCENT: 0.2 % (ref 0–1)
BILIRUB SERPL-MCNC: 0.9 MG/DL (ref 0–1)
BUN SERPL-MCNC: 12 MG/DL (ref 6–23)
CALCIUM SERPL-MCNC: 8.5 MG/DL (ref 8.3–10.6)
CHLORIDE BLD-SCNC: 98 MMOL/L (ref 99–110)
CO2: 34 MMOL/L (ref 21–32)
CREAT SERPL-MCNC: 0.7 MG/DL (ref 0.9–1.3)
CRP SERPL HS-MCNC: 5.2 MG/L
DIFFERENTIAL TYPE: ABNORMAL
EOSINOPHILS ABSOLUTE: 0 K/CU MM
EOSINOPHILS RELATIVE PERCENT: 0.1 % (ref 0–3)
GFR SERPL CREATININE-BSD FRML MDRD: >60 ML/MIN/1.73M2
GLUCOSE BLD-MCNC: 179 MG/DL (ref 70–99)
GLUCOSE BLD-MCNC: 235 MG/DL (ref 70–99)
GLUCOSE BLD-MCNC: 293 MG/DL (ref 70–99)
GLUCOSE SERPL-MCNC: 193 MG/DL (ref 70–99)
HCT VFR BLD CALC: 47.6 % (ref 42–52)
HEMOGLOBIN: 14.6 GM/DL (ref 13.5–18)
IMMATURE NEUTROPHIL %: 0.2 % (ref 0–0.43)
LYMPHOCYTES ABSOLUTE: 1.2 K/CU MM
LYMPHOCYTES RELATIVE PERCENT: 14.5 % (ref 24–44)
MAGNESIUM: 2 MG/DL (ref 1.8–2.4)
MCH RBC QN AUTO: 26.2 PG (ref 27–31)
MCHC RBC AUTO-ENTMCNC: 30.7 % (ref 32–36)
MCV RBC AUTO: 85.3 FL (ref 78–100)
MONOCYTES ABSOLUTE: 0.9 K/CU MM
MONOCYTES RELATIVE PERCENT: 11 % (ref 0–4)
NUCLEATED RBC %: 0 %
PDW BLD-RTO: 16.4 % (ref 11.7–14.9)
PLATELET # BLD: 201 K/CU MM (ref 140–440)
PMV BLD AUTO: 9.2 FL (ref 7.5–11.1)
POTASSIUM SERPL-SCNC: 4.5 MMOL/L (ref 3.5–5.1)
PROCALCITONIN SERPL-MCNC: 0.04 NG/ML
RBC # BLD: 5.58 M/CU MM (ref 4.6–6.2)
SEGMENTED NEUTROPHILS ABSOLUTE COUNT: 6.1 K/CU MM
SEGMENTED NEUTROPHILS RELATIVE PERCENT: 74 % (ref 36–66)
SODIUM BLD-SCNC: 139 MMOL/L (ref 135–145)
TOTAL IMMATURE NEUTOROPHIL: 0.02 K/CU MM
TOTAL NUCLEATED RBC: 0 K/CU MM
TOTAL PROTEIN: 5.8 GM/DL (ref 6.4–8.2)
WBC # BLD: 8.3 K/CU MM (ref 4–10.5)

## 2024-03-17 PROCEDURE — 80053 COMPREHEN METABOLIC PANEL: CPT

## 2024-03-17 PROCEDURE — 86140 C-REACTIVE PROTEIN: CPT

## 2024-03-17 PROCEDURE — 94762 N-INVAS EAR/PLS OXIMTRY CONT: CPT

## 2024-03-17 PROCEDURE — 2580000003 HC RX 258: Performed by: EMERGENCY MEDICINE

## 2024-03-17 PROCEDURE — 85025 COMPLETE CBC W/AUTO DIFF WBC: CPT

## 2024-03-17 PROCEDURE — 94761 N-INVAS EAR/PLS OXIMETRY MLT: CPT

## 2024-03-17 PROCEDURE — 71045 X-RAY EXAM CHEST 1 VIEW: CPT

## 2024-03-17 PROCEDURE — 36415 COLL VENOUS BLD VENIPUNCTURE: CPT

## 2024-03-17 PROCEDURE — 2700000000 HC OXYGEN THERAPY PER DAY

## 2024-03-17 PROCEDURE — 84145 PROCALCITONIN (PCT): CPT

## 2024-03-17 PROCEDURE — 6370000000 HC RX 637 (ALT 250 FOR IP): Performed by: INTERNAL MEDICINE

## 2024-03-17 PROCEDURE — 6370000000 HC RX 637 (ALT 250 FOR IP): Performed by: STUDENT IN AN ORGANIZED HEALTH CARE EDUCATION/TRAINING PROGRAM

## 2024-03-17 PROCEDURE — 82962 GLUCOSE BLOOD TEST: CPT

## 2024-03-17 PROCEDURE — 94640 AIRWAY INHALATION TREATMENT: CPT

## 2024-03-17 PROCEDURE — 2140000000 HC CCU INTERMEDIATE R&B

## 2024-03-17 PROCEDURE — 2580000003 HC RX 258: Performed by: INTERNAL MEDICINE

## 2024-03-17 PROCEDURE — 83735 ASSAY OF MAGNESIUM: CPT

## 2024-03-17 PROCEDURE — 94660 CPAP INITIATION&MGMT: CPT

## 2024-03-17 PROCEDURE — 6360000002 HC RX W HCPCS: Performed by: INTERNAL MEDICINE

## 2024-03-17 RX ORDER — IPRATROPIUM BROMIDE AND ALBUTEROL SULFATE 2.5; .5 MG/3ML; MG/3ML
1 SOLUTION RESPIRATORY (INHALATION) EVERY 4 HOURS PRN
Status: DISCONTINUED | OUTPATIENT
Start: 2024-03-17 | End: 2024-03-20 | Stop reason: HOSPADM

## 2024-03-17 RX ADMIN — POLYETHYLENE GLYCOL 3350 17 G: 17 POWDER, FOR SOLUTION ORAL at 07:36

## 2024-03-17 RX ADMIN — CEFEPIME 2000 MG: 2 INJECTION, POWDER, FOR SOLUTION INTRAVENOUS at 02:46

## 2024-03-17 RX ADMIN — IPRATROPIUM BROMIDE AND ALBUTEROL SULFATE 1 DOSE: 2.5; .5 SOLUTION RESPIRATORY (INHALATION) at 20:00

## 2024-03-17 RX ADMIN — VANCOMYCIN HYDROCHLORIDE 1500 MG: 1.5 INJECTION, POWDER, LYOPHILIZED, FOR SOLUTION INTRAVENOUS at 17:18

## 2024-03-17 RX ADMIN — CEFEPIME 2000 MG: 2 INJECTION, POWDER, FOR SOLUTION INTRAVENOUS at 09:53

## 2024-03-17 RX ADMIN — IPRATROPIUM BROMIDE AND ALBUTEROL SULFATE 1 DOSE: 2.5; .5 SOLUTION RESPIRATORY (INHALATION) at 09:04

## 2024-03-17 RX ADMIN — VANCOMYCIN HYDROCHLORIDE 1500 MG: 1.5 INJECTION, POWDER, LYOPHILIZED, FOR SOLUTION INTRAVENOUS at 06:26

## 2024-03-17 RX ADMIN — PREDNISONE 40 MG: 20 TABLET ORAL at 07:37

## 2024-03-17 RX ADMIN — LOSARTAN POTASSIUM 25 MG: 25 TABLET, FILM COATED ORAL at 07:37

## 2024-03-17 RX ADMIN — INSULIN LISPRO 4 UNITS: 100 INJECTION, SOLUTION INTRAVENOUS; SUBCUTANEOUS at 16:35

## 2024-03-17 RX ADMIN — SODIUM CHLORIDE, PRESERVATIVE FREE 10 ML: 5 INJECTION INTRAVENOUS at 20:28

## 2024-03-17 RX ADMIN — ENOXAPARIN SODIUM 30 MG: 100 INJECTION SUBCUTANEOUS at 20:27

## 2024-03-17 RX ADMIN — ENOXAPARIN SODIUM 30 MG: 100 INJECTION SUBCUTANEOUS at 07:36

## 2024-03-17 RX ADMIN — IPRATROPIUM BROMIDE AND ALBUTEROL SULFATE 1 DOSE: 2.5; .5 SOLUTION RESPIRATORY (INHALATION) at 15:21

## 2024-03-17 RX ADMIN — MUPIROCIN: 20 OINTMENT TOPICAL at 20:27

## 2024-03-17 ASSESSMENT — PAIN SCALES - WONG BAKER: WONGBAKER_NUMERICALRESPONSE: NO HURT

## 2024-03-17 ASSESSMENT — PAIN SCALES - GENERAL
PAINLEVEL_OUTOF10: 0
PAINLEVEL_OUTOF10: 0

## 2024-03-17 NOTE — PROGRESS NOTES
PHARMACY VANCOMYCIN MONITORING SERVICE  Pharmacy consulted by Dr. Patel for monitoring and adjustment.    Indication for treatment: Vancomycin indication: HAP  Goal trough: Trough Goal: 15-20 mcg/mL  AUC/MARIALUISA: 400-600    Risk Factors for MRSA Identified:   Hospitalization within the past 90 days, Received IV antibiotics within the past 90 days    Pertinent Laboratory Values:   Temp Readings from Last 3 Encounters:   03/17/24 98.1 °F (36.7 °C) (Oral)   02/06/24 97.9 °F (36.6 °C) (Oral)   01/09/24 98.2 °F (36.8 °C) (Oral)     Recent Labs     03/15/24  0214 03/16/24  0257 03/17/24  0149   WBC 8.5 7.6 8.3     Recent Labs     03/15/24  0214 03/16/24  0257 03/17/24  0149   BUN 11 11 12   CREATININE 0.7* 0.6* 0.7*     Estimated Creatinine Clearance: 131 mL/min (A) (based on SCr of 0.7 mg/dL (L)).    Intake/Output Summary (Last 24 hours) at 3/17/2024 1158  Last data filed at 3/17/2024 0748  Gross per 24 hour   Intake 480 ml   Output 2475 ml   Net -1995 ml     Last Encounter Weight:  Wt Readings from Last 3 Encounters:   03/14/24 102.1 kg (225 lb)   03/01/24 102.5 kg (226 lb)   02/13/24 101.2 kg (223 lb)     Pertinent Cultures:   Date    Source    Results  03/13/24  Blood    NGTD at 72 hours  03/13/24  Strep pneumo   Negative  03/13/24  Legionella   Negative  03/13/24  Respiratory Panel  Negative  03/13/24  MRSA Screen (Nasal) POSITIVE    Assessment:  HPI: 63 y.o. male with history of COPD, chronic respiratory failure on home oxygen, and DM type II. Patient has had recurrent admissions for COPD exacerbation. Patient brought to ED as a stroke alert and was admitted with respiratory failure and pneumonia.    Renal Function:  SCr stable and within normal limits  BUN within normal limits  Urine output not well documented - appears to be adequate  Day(s) of therapy: 5 of 7  Vancomycin concentration:   03/15 - 13.2mg/L ~ 9h post dose    Plan:  Continue vancomycin 1500 mg IV every 12 hours  Predicted  mg/L*h and trough

## 2024-03-17 NOTE — PROGRESS NOTES
V2.0    [facility] Progress Note      Name:  Jeromy Bojorquez /Age/Sex: 1960  (63 y.o. male)   MRN & CSN:  [id number] & [id number] Encounter Date/Time: 3/17/2024 8:30 AM EDT   [facility] PCP: No primary care provider on file.     Attending:Víctor Kwon MD       Hospital Day: 6    Assessment and Recommendations   Jeromy Bojorquez is a 63 y.o. male with pmh of  who presents with Acute on chronic respiratory failure with hypoxia and hypercapnia (HCC)      Plan:     #.  Acute on chronic respiratory failure with hypoxia and hypercapnia  -VBG-pH 7.26, pCO2 86, pO2 82, HCO3 38.6.  -Chest x-ray-mild left basilar airspace disease  -CTA chest-patchy bilateral airspace disease, elevated right hemidiaphragm.  -Patient currently on BiPAP-continue and wean as tolerated.  -Repeat VBG.  -Consult pulmonology.     #.  Pneumonia  -Patient had hospital admission 2024, 2024 and was treated with levofloxacin, ceftriaxone, azithromycin  -Continue broad-spectrum antibiotics-vancomycin, cefepime.  -Urine Streptococcus pneumonia antigen, Legionella antigen, sputum culture.  -Check procalcitonin.    #Hyperkalemia:  -pt's K showing above 6 0n 3/14 , w hemolysed sample, repeat K 5.5, pt s/p hyperkalemia treament w Insulin/ D50/ Lokelma x 1  with improvement to 4.8  -Low K diet     #.  Patient met sepsis criteria given his tachycardia and tachypnea which could be secondary to COPD exacerbation  -Patient has no leukocytosis, lactic acid 1, patient afebrile.  -Blood cultures drawn in ED.  -Patient received IV fluids as per sepsis protocol, got broad-spectrum antibiotics.     #.  Bilateral lower extremity weakness  -There was a stroke alert initially.  -CT head, CTA head and neck-no acute process.  -ED physician discussed with [facility] stroke team and felt patient was not a TNK candidate, overall presentation was likely secondary to hypercapnic respiratory failure.  -PT/OT evaluation.  -If suspicion for CVA high on  for input(s): \"PROBNP\" in the last 72 hours.    UA:  Lab Results   Component Value Date/Time    NITRU NEGATIVE 02/01/2024 08:54 PM    COLORU YELLOW 02/01/2024 08:54 PM    WBCUA 1 02/01/2024 08:54 PM    RBCUA 2 02/01/2024 08:54 PM    MUCUS FEW 02/01/2024 08:54 PM    TRICHOMONAS NONE SEEN 02/01/2024 08:54 PM    BACTERIA NEGATIVE 02/01/2024 08:54 PM    CLARITYU CLEAR 02/01/2024 08:54 PM    SPECGRAV >1.030 02/01/2024 08:54 PM    LEUKOCYTESUR NEGATIVE 02/01/2024 08:54 PM    UROBILINOGEN 1.0 02/01/2024 08:54 PM    BILIRUBINUR MODERATE NUMBER OR AMOUNT OBSERVED 02/01/2024 08:54 PM    BLOODU NEGATIVE 02/01/2024 08:54 PM    KETUA 15 02/01/2024 08:54 PM     Urine Cultures: No results found for: \"LABURIN\"  Blood Cultures: No results found for: \"BC\"  No results found for: \"BLOODCULT2\"  Organism: No results found for: \"ORG\"      Electronically signed by Víctor Kwon MD on 3/17/2024 at 1:52 PM

## 2024-03-17 NOTE — PROGRESS NOTES
pulmonary      SUBJECTIVE:  he feels better     OBJECTIVE    VITALS:  BP (!) 144/76   Pulse 68   Temp 98.1 °F (36.7 °C) (Oral)   Resp 18   Ht 1.778 m (5' 10\")   Wt 102.1 kg (225 lb)   SpO2 96%   BMI 32.28 kg/m²   HEAD AND FACE EXAM:  No throat injection, no active exudate,no thrush  NECK EXAM;No JVD, no masses, symmetrical  CHEST EXAM; Expansion equal and symmetrical, no masses  LUNG EXAM; Good breath sounds bilaterally. There are expiratory wheezes both lungs, there are crackles at both lung bases  CARDIOVASCULAR EXAM: Positive S1 and S2, no S3 or S4, no clicks ,no murmurs  RIGHT AND LEFT LOWER EXTRIMITY EXAM: No edema, no swelling, no inflamation  CNS EXAM: Alert and oriented X3          LABS   Lab Results   Component Value Date    WBC 8.3 03/17/2024    HGB 14.6 03/17/2024    HCT 47.6 03/17/2024    MCV 85.3 03/17/2024     03/17/2024     Lab Results   Component Value Date    CREATININE 0.7 (L) 03/17/2024    BUN 12 03/17/2024     03/17/2024    K 4.5 03/17/2024    CL 98 (L) 03/17/2024    CO2 34 (H) 03/17/2024     Lab Results   Component Value Date    INR 1.2 03/13/2024    PROTIME 15.1 (H) 03/13/2024          Lab Results   Component Value Date/Time    PHOS 3.0 02/02/2024 11:58 PM      No results for input(s): \"PH\", \"PO2ART\", \"MKQ2AHU\", \"HCO3\", \"BEART\", \"O2SAT\" in the last 72 hours.      Wt Readings from Last 3 Encounters:   03/14/24 102.1 kg (225 lb)   03/01/24 102.5 kg (226 lb)   02/13/24 101.2 kg (223 lb)               ASSESMENT  Ac on ch resp failure  Hermelindo pneumonia  Copd  ?ben        PLAN  Cpm  Pap at [facility]  As per pt overall improvemernt    3/17/2024  London Augustine MD, M.D.

## 2024-03-17 NOTE — PROGRESS NOTES
V2.0    [facility] Progress Note      Name:  Jeromy Bojorquez /Age/Sex: 1960  (63 y.o. male)   MRN & CSN:  [id number] & [id number] Encounter Date/Time: 3/17/2024 8:30 AM EDT   [facility] PCP: No primary care provider on file.     Attending:Víctor Kwon MD       Hospital Day: 6    Assessment and Recommendations   Jeromy Bojorquez is a 63 y.o. male with pmh of  who presents with Acute on chronic respiratory failure with hypoxia and hypercapnia (HCC)      Plan:     #.  Acute on chronic respiratory failure with hypoxia and hypercapnia  -VBG-pH 7.26, pCO2 86, pO2 82, HCO3 38.6.  -Chest x-ray-mild left basilar airspace disease  -CTA chest-patchy bilateral airspace disease, elevated right hemidiaphragm.  -Patient currently on BiPAP-continue and wean as tolerated.  -Repeat VBG.  -Consult pulmonology.     #.  Pneumonia  -Patient had hospital admission 2024, 2024 and was treated with levofloxacin, ceftriaxone, azithromycin  -Continue broad-spectrum antibiotics-vancomycin, cefepime.  -Urine Streptococcus pneumonia antigen, Legionella antigen, sputum culture.  -Check procalcitonin.    #Hyperkalemia:  -pt's K showing above 6 0n 3/14 , w hemolysed sample, repeat K 5.5, pt s/p hyperkalemia treament w Insulin/ D50/ Lokelma x 1  with improvement to 4.8  -Low K diet     #.  Patient met sepsis criteria given his tachycardia and tachypnea which could be secondary to COPD exacerbation  -Patient has no leukocytosis, lactic acid 1, patient afebrile.  -Blood cultures drawn in ED.  -Patient received IV fluids as per sepsis protocol, got broad-spectrum antibiotics.     #.  Bilateral lower extremity weakness  -There was a stroke alert initially.  -CT head, CTA head and neck-no acute process.  -ED physician discussed with [facility] stroke team and felt patient was not a TNK candidate, overall presentation was likely secondary to hypercapnic respiratory failure.  -PT/OT evaluation.  -If suspicion for CVA high on  02/01/2024 08:54 PM    COLORU YELLOW 02/01/2024 08:54 PM    WBCUA 1 02/01/2024 08:54 PM    RBCUA 2 02/01/2024 08:54 PM    MUCUS FEW 02/01/2024 08:54 PM    TRICHOMONAS NONE SEEN 02/01/2024 08:54 PM    BACTERIA NEGATIVE 02/01/2024 08:54 PM    CLARITYU CLEAR 02/01/2024 08:54 PM    SPECGRAV >1.030 02/01/2024 08:54 PM    LEUKOCYTESUR NEGATIVE 02/01/2024 08:54 PM    UROBILINOGEN 1.0 02/01/2024 08:54 PM    BILIRUBINUR MODERATE NUMBER OR AMOUNT OBSERVED 02/01/2024 08:54 PM    BLOODU NEGATIVE 02/01/2024 08:54 PM    KETUA 15 02/01/2024 08:54 PM     Urine Cultures: No results found for: \"LABURIN\"  Blood Cultures: No results found for: \"BC\"  No results found for: \"BLOODCULT2\"  Organism: No results found for: \"ORG\"      Electronically signed by Víctor Kwon MD on 3/17/2024 at 1:51 PM

## 2024-03-17 NOTE — PROGRESS NOTES
Patient Room #: 3106/3106-A  Patient Name: Jeromy Bojorquez    Home NIV Evaluation / Orders  1. Notify Pulmonary Diagnostics of order to evaluate for home NIV.    2. Perform the following tests at any point before discharge.     [x] Perform an Overnight Oximetry while the patient is on at least                  2 lpm of oxygen. The saturation level must be <  88% for > 5                   cumulative minutes to qualify.                 [x] Arterial puncture (ABG) for blood gas analysis done while awake.      Qualifying result is a PaCO2 >   52 mmHg    3.         [x]  I have documented in a progress note that ANIVAL is not the primary cause of hypercapnia in this patient. CPAP will not effectively treat this patient hypercapnia. BIPA Therapy will benefit and more effectively treat the hypercapnia.                            Results Documentation    (Attach a copy of Reports)  1. ABG Results       Date __03/18/2024___  PaCO2 ___64_______ mmHg on ___2____ lpm oxygen    2. Oximetry Level _< 88____% for ____25____ minutes on __2____ lpm oxygen    3. [x] Patient Qualifies      [] Patient Does NOT qualify      Complete order below:  Diagnosis SEVERE COPD AND _Acute on chronic respiratory failure with hypoxia and hypercapnia _______           Length of Need: [x] Lifetime  Home NIV () at:   Inspiratory Setting ___15____ cm H2O         Expiratory Setting ___5____ cm H2O    Therapist Signature:Karine Cary RRT Date: 3/17/2024    Physician Signature:   Electronically Signed in EMR_______  Date: _____________    Physician Printed Name:Ordering User: Víctor Kwon MD  Provider ID: [id number]  NPI:  [id number]

## 2024-03-18 LAB
ALBUMIN SERPL-MCNC: 3.6 GM/DL (ref 3.4–5)
ALP BLD-CCNC: 57 IU/L (ref 40–129)
ALT SERPL-CCNC: 25 U/L (ref 10–40)
ANION GAP SERPL CALCULATED.3IONS-SCNC: 7 MMOL/L (ref 7–16)
AST SERPL-CCNC: 19 IU/L (ref 15–37)
BASE EXCESS MIXED: 14 (ref 0–3)
BASOPHILS ABSOLUTE: 0 K/CU MM
BASOPHILS RELATIVE PERCENT: 0.2 % (ref 0–1)
BILIRUB SERPL-MCNC: 0.8 MG/DL (ref 0–1)
BUN SERPL-MCNC: 12 MG/DL (ref 6–23)
CALCIUM SERPL-MCNC: 9.4 MG/DL (ref 8.3–10.6)
CARBON MONOXIDE, BLOOD: 4.4 % (ref 0–5)
CHLORIDE BLD-SCNC: 98 MMOL/L (ref 99–110)
CO2 CONTENT: 43.5 MMOL/L (ref 21–32)
CO2: 36 MMOL/L (ref 21–32)
COMMENT: ABNORMAL
CREAT SERPL-MCNC: 0.7 MG/DL (ref 0.9–1.3)
CULTURE: NORMAL
CULTURE: NORMAL
DIFFERENTIAL TYPE: ABNORMAL
DOSE AMOUNT: NORMAL
DOSE TIME: NORMAL
EOSINOPHILS ABSOLUTE: 0 K/CU MM
EOSINOPHILS RELATIVE PERCENT: 0.4 % (ref 0–3)
GFR SERPL CREATININE-BSD FRML MDRD: >60 ML/MIN/1.73M2
GLUCOSE BLD-MCNC: 155 MG/DL (ref 70–99)
GLUCOSE BLD-MCNC: 184 MG/DL (ref 70–99)
GLUCOSE BLD-MCNC: 241 MG/DL (ref 70–99)
GLUCOSE SERPL-MCNC: 172 MG/DL (ref 70–99)
HCO3 ARTERIAL: 41.5 MMOL/L (ref 21–28)
HCT VFR BLD CALC: 50.9 % (ref 42–52)
HEMOGLOBIN: 15.3 GM/DL (ref 13.5–18)
IMMATURE NEUTROPHIL %: 0.2 % (ref 0–0.43)
LYMPHOCYTES ABSOLUTE: 1.4 K/CU MM
LYMPHOCYTES RELATIVE PERCENT: 17.1 % (ref 24–44)
Lab: NORMAL
Lab: NORMAL
MAGNESIUM: 2.2 MG/DL (ref 1.8–2.4)
MCH RBC QN AUTO: 26 PG (ref 27–31)
MCHC RBC AUTO-ENTMCNC: 30.1 % (ref 32–36)
MCV RBC AUTO: 86.6 FL (ref 78–100)
METHEMOGLOBIN ARTERIAL: 0.6 %
MONOCYTES ABSOLUTE: 0.9 K/CU MM
MONOCYTES RELATIVE PERCENT: 11.2 % (ref 0–4)
NUCLEATED RBC %: 0 %
O2 SATURATION: 86.7 % (ref 94–98)
PCO2 ARTERIAL: 64 MMHG (ref 35–48)
PDW BLD-RTO: 16.2 % (ref 11.7–14.9)
PH BLOOD: 7.42 (ref 7.35–7.45)
PLATELET # BLD: 201 K/CU MM (ref 140–440)
PMV BLD AUTO: 9.2 FL (ref 7.5–11.1)
PO2 ARTERIAL: 60 MMHG (ref 83–108)
POTASSIUM SERPL-SCNC: 5.6 MMOL/L (ref 3.5–5.1)
RBC # BLD: 5.88 M/CU MM (ref 4.6–6.2)
SEGMENTED NEUTROPHILS ABSOLUTE COUNT: 5.9 K/CU MM
SEGMENTED NEUTROPHILS RELATIVE PERCENT: 70.9 % (ref 36–66)
SODIUM BLD-SCNC: 141 MMOL/L (ref 135–145)
SPECIMEN: NORMAL
SPECIMEN: NORMAL
TOTAL IMMATURE NEUTOROPHIL: 0.02 K/CU MM
TOTAL NUCLEATED RBC: 0 K/CU MM
TOTAL PROTEIN: 6.1 GM/DL (ref 6.4–8.2)
VANCOMYCIN RANDOM: 18.1 UG/ML
WBC # BLD: 8.4 K/CU MM (ref 4–10.5)

## 2024-03-18 PROCEDURE — 80202 ASSAY OF VANCOMYCIN: CPT

## 2024-03-18 PROCEDURE — 6370000000 HC RX 637 (ALT 250 FOR IP): Performed by: STUDENT IN AN ORGANIZED HEALTH CARE EDUCATION/TRAINING PROGRAM

## 2024-03-18 PROCEDURE — 6370000000 HC RX 637 (ALT 250 FOR IP): Performed by: INTERNAL MEDICINE

## 2024-03-18 PROCEDURE — 36415 COLL VENOUS BLD VENIPUNCTURE: CPT

## 2024-03-18 PROCEDURE — 36600 WITHDRAWAL OF ARTERIAL BLOOD: CPT

## 2024-03-18 PROCEDURE — 6360000002 HC RX W HCPCS: Performed by: INTERNAL MEDICINE

## 2024-03-18 PROCEDURE — 80053 COMPREHEN METABOLIC PANEL: CPT

## 2024-03-18 PROCEDURE — 82803 BLOOD GASES ANY COMBINATION: CPT

## 2024-03-18 PROCEDURE — 94761 N-INVAS EAR/PLS OXIMETRY MLT: CPT

## 2024-03-18 PROCEDURE — 2580000003 HC RX 258: Performed by: INTERNAL MEDICINE

## 2024-03-18 PROCEDURE — 83735 ASSAY OF MAGNESIUM: CPT

## 2024-03-18 PROCEDURE — 82962 GLUCOSE BLOOD TEST: CPT

## 2024-03-18 PROCEDURE — 2140000000 HC CCU INTERMEDIATE R&B

## 2024-03-18 PROCEDURE — 85025 COMPLETE CBC W/AUTO DIFF WBC: CPT

## 2024-03-18 PROCEDURE — 6370000000 HC RX 637 (ALT 250 FOR IP)

## 2024-03-18 PROCEDURE — 2700000000 HC OXYGEN THERAPY PER DAY

## 2024-03-18 RX ADMIN — CEFEPIME 2000 MG: 2 INJECTION, POWDER, FOR SOLUTION INTRAVENOUS at 01:44

## 2024-03-18 RX ADMIN — ENOXAPARIN SODIUM 30 MG: 100 INJECTION SUBCUTANEOUS at 20:47

## 2024-03-18 RX ADMIN — LOSARTAN POTASSIUM 25 MG: 25 TABLET, FILM COATED ORAL at 08:57

## 2024-03-18 RX ADMIN — MUPIROCIN: 20 OINTMENT TOPICAL at 20:51

## 2024-03-18 RX ADMIN — ENOXAPARIN SODIUM 30 MG: 100 INJECTION SUBCUTANEOUS at 08:57

## 2024-03-18 RX ADMIN — SODIUM CHLORIDE, PRESERVATIVE FREE 10 ML: 5 INJECTION INTRAVENOUS at 20:48

## 2024-03-18 RX ADMIN — INSULIN LISPRO 2 UNITS: 100 INJECTION, SOLUTION INTRAVENOUS; SUBCUTANEOUS at 12:30

## 2024-03-18 RX ADMIN — SODIUM ZIRCONIUM CYCLOSILICATE 10 G: 10 POWDER, FOR SUSPENSION ORAL at 20:47

## 2024-03-18 RX ADMIN — MUPIROCIN: 20 OINTMENT TOPICAL at 08:57

## 2024-03-18 RX ADMIN — VANCOMYCIN HYDROCHLORIDE 1500 MG: 1.5 INJECTION, POWDER, LYOPHILIZED, FOR SOLUTION INTRAVENOUS at 17:57

## 2024-03-18 RX ADMIN — CEFEPIME 2000 MG: 2 INJECTION, POWDER, FOR SOLUTION INTRAVENOUS at 19:47

## 2024-03-18 RX ADMIN — SODIUM CHLORIDE, PRESERVATIVE FREE 10 ML: 5 INJECTION INTRAVENOUS at 08:59

## 2024-03-18 RX ADMIN — VANCOMYCIN HYDROCHLORIDE 1500 MG: 1.5 INJECTION, POWDER, LYOPHILIZED, FOR SOLUTION INTRAVENOUS at 05:53

## 2024-03-18 RX ADMIN — SODIUM ZIRCONIUM CYCLOSILICATE 10 G: 10 POWDER, FOR SUSPENSION ORAL at 15:13

## 2024-03-18 RX ADMIN — CEFEPIME 2000 MG: 2 INJECTION, POWDER, FOR SOLUTION INTRAVENOUS at 11:16

## 2024-03-18 RX ADMIN — SODIUM ZIRCONIUM CYCLOSILICATE 10 G: 10 POWDER, FOR SUSPENSION ORAL at 07:28

## 2024-03-18 RX ADMIN — DOCUSATE SODIUM 50 MG AND SENNOSIDES 8.6 MG 2 TABLET: 8.6; 5 TABLET, FILM COATED ORAL at 20:47

## 2024-03-18 ASSESSMENT — PAIN SCALES - WONG BAKER: WONGBAKER_NUMERICALRESPONSE: NO HURT

## 2024-03-18 ASSESSMENT — PAIN SCALES - GENERAL: PAINLEVEL_OUTOF10: 0

## 2024-03-18 NOTE — PROGRESS NOTES
PHARMACY VANCOMYCIN MONITORING SERVICE  Pharmacy consulted by Dr. Patel for monitoring and adjustment.    Indication for treatment: Vancomycin indication: HAP  Goal trough: Trough Goal: 15-20 mcg/mL  AUC/MARIALUISA: 400-600    Risk Factors for MRSA Identified:   Hospitalization within the past 90 days, Received IV antibiotics within the past 90 days    Pertinent Laboratory Values:   Temp Readings from Last 3 Encounters:   03/18/24 98.6 °F (37 °C)   02/06/24 97.9 °F (36.6 °C) (Oral)   01/09/24 98.2 °F (36.8 °C) (Oral)     Recent Labs     03/16/24  0257 03/17/24  0149 03/18/24  0421   WBC 7.6 8.3 8.4     Recent Labs     03/16/24  0257 03/17/24  0149 03/18/24  0421   BUN 11 12 12   CREATININE 0.6* 0.7* 0.7*     Estimated Creatinine Clearance: 131 mL/min (A) (based on SCr of 0.7 mg/dL (L)).    Intake/Output Summary (Last 24 hours) at 3/18/2024 0908  Last data filed at 3/18/2024 0330  Gross per 24 hour   Intake 310 ml   Output 2700 ml   Net -2390 ml     Last Encounter Weight:  Wt Readings from Last 3 Encounters:   03/14/24 102.1 kg (225 lb)   03/01/24 102.5 kg (226 lb)   02/13/24 101.2 kg (223 lb)     Pertinent Cultures:   Date    Source    Results  03/13/24  Blood    NGTD at 72 hours  03/13/24  Strep pneumo   Negative  03/13/24  Legionella   Negative  03/13/24  Respiratory Panel  Negative  03/13/24  MRSA Screen (Nasal) POSITIVE    Assessment:  HPI: 63 y.o. male with history of COPD, chronic respiratory failure on home oxygen, and DM type II. Patient has had recurrent admissions for COPD exacerbation. Patient brought to ED as a stroke alert and was admitted with respiratory failure and pneumonia.    Renal Function:  SCr stable and within normal limits  BUN within normal limits  Urine output not well documented - appears to be adequate  Day(s) of therapy: 6 of 7  Vancomycin concentration:   03/15 - 13.2mg/L ~ 9h post dose  3/18 -18.1 mg/L ~ 11h post dose;     Plan:  Continue vancomycin 1500 mg IV every 12

## 2024-03-18 NOTE — PROGRESS NOTES
BiPap Evaluation is complete and has been faxed to [facility]. Please call [facility] () before discharge.

## 2024-03-18 NOTE — PROGRESS NOTES
Doctor Please copy and paste below in your progress note per DME requirment.    Patient was seen in hospital for Chronic Respiratory Failure due to Severe COPD.   I am prescribing BiPAP because the diagnosis and testing requires the patient to have BiPAP in the home. Conditions will improve or be benefited by the use of BiPAP.  CPAP will not effectively treat this patient's hypercapnia. I have determined through testing and evaluation that ANIVAL is not the primary cause of this patients hypercapnia.

## 2024-03-18 NOTE — PROGRESS NOTES
overall presentation was likely secondary to hypercapnic respiratory failure.  -PT/OT evaluation.  -If suspicion for CVA high on reevaluation-can order MRI.  -Continue NIHSS x 6     #.  Diabetes mellitus type 2, not on long-term insulin  -Patient is on glimepiride, metformin  -Continue insulin sliding scale with hypoglycemia protocol     #.  Hypertension-continue losartan     #.  Severe COPD, chronic respiratory failure on home oxygen at 2 L/min  -Patient had 2 admissions 1/2024, 2/2024 for COPD exacerbation.      Patient was seen in hospital for Chronic Respiratory Failure due to Severe COPD.   I am prescribing BiPAP because the diagnosis and testing requires the patient to have BiPAP in the home. Conditions will improve or be benefited by the use of BiPAP.  CPAP will not effectively treat this patient's hypercapnia. I have determined through testing and evaluation that ANIVAL is not the primary cause of this patients hypercapnia.        Diet ADULT DIET; Regular; Low Sodium (2 gm)   DVT Prophylaxis [] Lovenox, []  Heparin, [] SCDs, [] Ambulation,  [] Eliquis, [] Xarelto  [] Coumadin   Code Status Full Code   Disposition From:   Expected Disposition: home w family  Estimated Date of Discharge:   Patient requires continued admission due to pending id eval/home bipap setup   Surrogate Decision Maker/ POA       Personally reviewed Lab Studies and Imaging     Subjective:     Chief Complaint: Shortness of Breath    Jeromy Bojorquez is a 63 y.o. male who presents with AHRF    3/17- Pt symptomatically improved, will do Home BiPAP eval given high PCO2 and severe COPD     3/18- VSS, labs w hyperkalemia 5.6 despite low k diet, willl give lokelma, will check bladder scan. Pt wualified for Home BiPAP, Cxr w worsening patchy pneumonia, MRSA nares+, Pt already on Vanco/ mupirocin for decolonization, ID consulted for possible MRSA pneumonia    Review of Systems:      Pertinent positives and negatives discussed in  HPI    Objective:     Intake/Output Summary (Last 24 hours) at 3/18/2024 1146  Last data filed at 3/18/2024 0330  Gross per 24 hour   Intake 310 ml   Output 2700 ml   Net -2390 ml        Vitals:   Vitals:    03/18/24 0150 03/18/24 0330 03/18/24 0430 03/18/24 0842   BP: 126/63  122/62 125/71   Pulse: 65 68 73 79   Resp: 22 14 29 (!) 31   Temp: 98.5 °F (36.9 °C)  98.7 °F (37.1 °C) 98.6 °F (37 °C)   TempSrc: Oral  Oral    SpO2: 96%  92% 93%   Weight:       Height:             Physical Exam:      General: NAD  Eyes: EOMI  ENT: neck supple  Cardiovascular: Regular rate.  Respiratory: Clear to auscultation  Gastrointestinal: Soft, non tender  Genitourinary: no suprapubic tenderness  Musculoskeletal: No edema  Skin: warm, dry  Neuro: Alert.  Psych: Mood appropriate.         Medications:   Medications:    mupirocin   Each Nostril BID    ipratropium 0.5 mg-albuterol 2.5 mg  1 Dose Inhalation BID    sodium chloride flush  5-40 mL IntraVENous BID    sodium chloride flush  5-40 mL IntraVENous 2 times per day    enoxaparin  30 mg SubCUTAneous BID    cefepime  2,000 mg IntraVENous q8h    losartan  25 mg Oral Daily    insulin lispro  0-8 Units SubCUTAneous TID WC    insulin lispro  0-4 Units SubCUTAneous Nightly    vancomycin  1,500 mg IntraVENous Q12H    sodium chloride flush  5-40 mL IntraVENous BID      Infusions:    sodium chloride      dextrose       PRN Meds: ipratropium 0.5 mg-albuterol 2.5 mg, 1 Dose, Q4H PRN  sennosides-docusate sodium, 2 tablet, BID PRN  sodium chloride flush, 5-40 mL, PRN  sodium chloride, , PRN  ondansetron, 4 mg, Q8H PRN   Or  ondansetron, 4 mg, Q6H PRN  polyethylene glycol, 17 g, Daily PRN  acetaminophen, 650 mg, Q6H PRN   Or  acetaminophen, 650 mg, Q6H PRN  albuterol sulfate HFA, 2 puff, Q6H PRN  glucose, 4 tablet, PRN  dextrose bolus, 125 mL, PRN   Or  dextrose bolus, 250 mL, PRN  glucagon (rDNA), 1 mg, PRN  dextrose, , Continuous PRN        Labs and Imaging   CTA PULMONARY W CONTRAST    Result

## 2024-03-18 NOTE — CARE COORDINATION
CM met with pt for F/U visit for D/C planning. Pt still plans to return home with family and denies any DC needs. NN

## 2024-03-18 NOTE — PROGRESS NOTES
pulmonary      SUBJECTIVE:  weaqk but stable     OBJECTIVE    VITALS:  /62   Pulse 73   Temp 98.7 °F (37.1 °C) (Oral)   Resp 29   Ht 1.778 m (5' 10\")   Wt 102.1 kg (225 lb)   SpO2 92%   BMI 32.28 kg/m²   HEAD AND FACE EXAM:  No throat injection, no active exudate,no thrush  NECK EXAM;No JVD, no masses, symmetrical  CHEST EXAM; Expansion equal and symmetrical, no masses  LUNG EXAM; Good breath sounds bilaterally. There are expiratory wheezes both lungs, there are crackles at both lung bases  CARDIOVASCULAR EXAM: Positive S1 and S2, no S3 or S4, no clicks ,no murmurs  RIGHT AND LEFT LOWER EXTRIMITY EXAM: No edema, no swelling, no inflamation  CNS EXAM: Alert and oriented X3          LABS   Lab Results   Component Value Date    WBC 8.4 03/18/2024    HGB 15.3 03/18/2024    HCT 50.9 03/18/2024    MCV 86.6 03/18/2024     03/18/2024     Lab Results   Component Value Date    CREATININE 0.7 (L) 03/18/2024    BUN 12 03/18/2024     03/18/2024    K 5.6 (HH) 03/18/2024    CL 98 (L) 03/18/2024    CO2 36 (H) 03/18/2024     Lab Results   Component Value Date    INR 1.2 03/13/2024    PROTIME 15.1 (H) 03/13/2024          Lab Results   Component Value Date/Time    PHOS 3.0 02/02/2024 11:58 PM        Recent Labs     03/18/24  0600   PH 7.42   PO2ART 60*   [id number] 64.0*   O2SAT 86.7*         Wt Readings from Last 3 Encounters:   03/14/24 102.1 kg (225 lb)   03/01/24 102.5 kg (226 lb)   02/13/24 101.2 kg (223 lb)               ASSESMENT  Ac on ch resp failure  Ac copd  pneumonia        PLAN  Cpm  Cont o2  Home bipap eval in progress    3/18/2024  London Augustine MD, M.D.

## 2024-03-19 LAB
ALBUMIN SERPL-MCNC: 3.4 GM/DL (ref 3.4–5)
ALP BLD-CCNC: 53 IU/L (ref 40–129)
ALT SERPL-CCNC: 26 U/L (ref 10–40)
ANION GAP SERPL CALCULATED.3IONS-SCNC: 8 MMOL/L (ref 7–16)
AST SERPL-CCNC: 17 IU/L (ref 15–37)
BASOPHILS ABSOLUTE: 0.1 K/CU MM
BASOPHILS RELATIVE PERCENT: 1 % (ref 0–1)
BILIRUB SERPL-MCNC: 0.8 MG/DL (ref 0–1)
BUN SERPL-MCNC: 10 MG/DL (ref 6–23)
CALCIUM SERPL-MCNC: 8.2 MG/DL (ref 8.3–10.6)
CHLORIDE BLD-SCNC: 98 MMOL/L (ref 99–110)
CO2: 33 MMOL/L (ref 21–32)
CREAT SERPL-MCNC: 0.5 MG/DL (ref 0.9–1.3)
DIFFERENTIAL TYPE: ABNORMAL
EOSINOPHILS ABSOLUTE: 0.2 K/CU MM
EOSINOPHILS RELATIVE PERCENT: 2.7 % (ref 0–3)
GFR SERPL CREATININE-BSD FRML MDRD: >60 ML/MIN/1.73M2
GLUCOSE BLD-MCNC: 146 MG/DL (ref 70–99)
GLUCOSE BLD-MCNC: 160 MG/DL (ref 70–99)
GLUCOSE BLD-MCNC: 188 MG/DL (ref 70–99)
GLUCOSE BLD-MCNC: 267 MG/DL (ref 70–99)
GLUCOSE BLD-MCNC: 385 MG/DL (ref 70–99)
GLUCOSE SERPL-MCNC: 152 MG/DL (ref 70–99)
HCT VFR BLD CALC: 49.8 % (ref 42–52)
HEMOGLOBIN: 15.7 GM/DL (ref 13.5–18)
IMMATURE NEUTROPHIL %: 0.1 % (ref 0–0.43)
LYMPHOCYTES ABSOLUTE: 1.7 K/CU MM
LYMPHOCYTES RELATIVE PERCENT: 24 % (ref 24–44)
MAGNESIUM: 2 MG/DL (ref 1.8–2.4)
MCH RBC QN AUTO: 27.1 PG (ref 27–31)
MCHC RBC AUTO-ENTMCNC: 31.5 % (ref 32–36)
MCV RBC AUTO: 85.9 FL (ref 78–100)
MONOCYTES ABSOLUTE: 1.1 K/CU MM
MONOCYTES RELATIVE PERCENT: 15.9 % (ref 0–4)
NUCLEATED RBC %: 0 %
PDW BLD-RTO: 16.3 % (ref 11.7–14.9)
PLATELET # BLD: 206 K/CU MM (ref 140–440)
PMV BLD AUTO: 9.6 FL (ref 7.5–11.1)
POTASSIUM SERPL-SCNC: 4.1 MMOL/L (ref 3.5–5.1)
RBC # BLD: 5.8 M/CU MM (ref 4.6–6.2)
SEGMENTED NEUTROPHILS ABSOLUTE COUNT: 3.9 K/CU MM
SEGMENTED NEUTROPHILS RELATIVE PERCENT: 56.3 % (ref 36–66)
SODIUM BLD-SCNC: 139 MMOL/L (ref 135–145)
TOTAL IMMATURE NEUTOROPHIL: 0.01 K/CU MM
TOTAL NUCLEATED RBC: 0 K/CU MM
TOTAL PROTEIN: 5.7 GM/DL (ref 6.4–8.2)
WBC # BLD: 7 K/CU MM (ref 4–10.5)

## 2024-03-19 PROCEDURE — 6370000000 HC RX 637 (ALT 250 FOR IP): Performed by: INTERNAL MEDICINE

## 2024-03-19 PROCEDURE — 2580000003 HC RX 258: Performed by: INTERNAL MEDICINE

## 2024-03-19 PROCEDURE — APPSS60 APP SPLIT SHARED TIME 46-60 MINUTES: Performed by: NURSE PRACTITIONER

## 2024-03-19 PROCEDURE — 2700000000 HC OXYGEN THERAPY PER DAY

## 2024-03-19 PROCEDURE — 83735 ASSAY OF MAGNESIUM: CPT

## 2024-03-19 PROCEDURE — 6370000000 HC RX 637 (ALT 250 FOR IP): Performed by: STUDENT IN AN ORGANIZED HEALTH CARE EDUCATION/TRAINING PROGRAM

## 2024-03-19 PROCEDURE — 36415 COLL VENOUS BLD VENIPUNCTURE: CPT

## 2024-03-19 PROCEDURE — 82962 GLUCOSE BLOOD TEST: CPT

## 2024-03-19 PROCEDURE — 6370000000 HC RX 637 (ALT 250 FOR IP): Performed by: NURSE PRACTITIONER

## 2024-03-19 PROCEDURE — 94640 AIRWAY INHALATION TREATMENT: CPT

## 2024-03-19 PROCEDURE — 99223 1ST HOSP IP/OBS HIGH 75: CPT | Performed by: INTERNAL MEDICINE

## 2024-03-19 PROCEDURE — 2140000000 HC CCU INTERMEDIATE R&B

## 2024-03-19 PROCEDURE — 85025 COMPLETE CBC W/AUTO DIFF WBC: CPT

## 2024-03-19 PROCEDURE — 80053 COMPREHEN METABOLIC PANEL: CPT

## 2024-03-19 PROCEDURE — 6360000002 HC RX W HCPCS: Performed by: INTERNAL MEDICINE

## 2024-03-19 PROCEDURE — 94761 N-INVAS EAR/PLS OXIMETRY MLT: CPT

## 2024-03-19 RX ORDER — LINEZOLID 600 MG/1
600 TABLET, FILM COATED ORAL EVERY 12 HOURS SCHEDULED
Status: DISCONTINUED | OUTPATIENT
Start: 2024-03-19 | End: 2024-03-20 | Stop reason: HOSPADM

## 2024-03-19 RX ORDER — LINEZOLID 600 MG/1
600 TABLET, FILM COATED ORAL EVERY 12 HOURS SCHEDULED
Status: DISCONTINUED | OUTPATIENT
Start: 2024-03-19 | End: 2024-03-19

## 2024-03-19 RX ADMIN — LINEZOLID 600 MG: 600 TABLET, FILM COATED ORAL at 20:56

## 2024-03-19 RX ADMIN — SODIUM CHLORIDE, PRESERVATIVE FREE 10 ML: 5 INJECTION INTRAVENOUS at 20:56

## 2024-03-19 RX ADMIN — IPRATROPIUM BROMIDE AND ALBUTEROL SULFATE 1 DOSE: 2.5; .5 SOLUTION RESPIRATORY (INHALATION) at 20:11

## 2024-03-19 RX ADMIN — DOCUSATE SODIUM 50 MG AND SENNOSIDES 8.6 MG 2 TABLET: 8.6; 5 TABLET, FILM COATED ORAL at 20:56

## 2024-03-19 RX ADMIN — CEFEPIME 2000 MG: 2 INJECTION, POWDER, FOR SOLUTION INTRAVENOUS at 01:53

## 2024-03-19 RX ADMIN — MUPIROCIN: 20 OINTMENT TOPICAL at 20:56

## 2024-03-19 RX ADMIN — ENOXAPARIN SODIUM 30 MG: 100 INJECTION SUBCUTANEOUS at 08:53

## 2024-03-19 RX ADMIN — MUPIROCIN: 20 OINTMENT TOPICAL at 08:53

## 2024-03-19 RX ADMIN — IPRATROPIUM BROMIDE AND ALBUTEROL SULFATE 1 DOSE: 2.5; .5 SOLUTION RESPIRATORY (INHALATION) at 08:43

## 2024-03-19 RX ADMIN — SODIUM CHLORIDE, PRESERVATIVE FREE 10 ML: 5 INJECTION INTRAVENOUS at 08:53

## 2024-03-19 RX ADMIN — LOSARTAN POTASSIUM 25 MG: 25 TABLET, FILM COATED ORAL at 08:53

## 2024-03-19 RX ADMIN — LINEZOLID 600 MG: 600 TABLET, FILM COATED ORAL at 11:02

## 2024-03-19 RX ADMIN — ENOXAPARIN SODIUM 30 MG: 100 INJECTION SUBCUTANEOUS at 20:56

## 2024-03-19 RX ADMIN — VANCOMYCIN HYDROCHLORIDE 1500 MG: 1.5 INJECTION, POWDER, LYOPHILIZED, FOR SOLUTION INTRAVENOUS at 05:55

## 2024-03-19 RX ADMIN — INSULIN LISPRO 4 UNITS: 100 INJECTION, SOLUTION INTRAVENOUS; SUBCUTANEOUS at 11:48

## 2024-03-19 ASSESSMENT — PAIN SCALES - WONG BAKER: WONGBAKER_NUMERICALRESPONSE: NO HURT

## 2024-03-19 ASSESSMENT — PAIN SCALES - GENERAL: PAINLEVEL_OUTOF10: 0

## 2024-03-19 NOTE — PROGRESS NOTES
PHARMACY VANCOMYCIN MONITORING SERVICE  Pharmacy consulted by Dr. Patel for monitoring and adjustment.    Indication for treatment: Vancomycin indication: HAP  Goal trough: Trough Goal: 15-20 mcg/mL  AUC/MARIALUISA: 400-600    Risk Factors for MRSA Identified:   Hospitalization within the past 90 days, Received IV antibiotics within the past 90 days    Pertinent Laboratory Values:   Temp Readings from Last 3 Encounters:   03/19/24 98.1 °F (36.7 °C) (Oral)   02/06/24 97.9 °F (36.6 °C) (Oral)   01/09/24 98.2 °F (36.8 °C) (Oral)     Recent Labs     03/17/24  0149 03/18/24  0421 03/19/24  0213   WBC 8.3 8.4 7.0       Recent Labs     03/17/24  0149 03/18/24  0421 03/19/24  0213   BUN 12 12 10   CREATININE 0.7* 0.7* 0.5*       Estimated Creatinine Clearance: 183 mL/min (A) (based on SCr of 0.5 mg/dL (L)).    Intake/Output Summary (Last 24 hours) at 3/19/2024 0958  Last data filed at 3/19/2024 0000  Gross per 24 hour   Intake 1913.33 ml   Output 1750 ml   Net 163.33 ml       Last Encounter Weight:  Wt Readings from Last 3 Encounters:   03/14/24 102.1 kg (225 lb)   03/01/24 102.5 kg (226 lb)   02/13/24 101.2 kg (223 lb)     Pertinent Cultures:   Date    Source    Results  03/13/24  Blood    NGTD at 72 hours  03/13/24  Strep pneumo   Negative  03/13/24  Legionella   Negative  03/13/24  Respiratory Panel  Negative  03/13/24  MRSA Screen (Nasal) POSITIVE    Assessment:  HPI: 63 y.o. male with history of COPD, chronic respiratory failure on home oxygen, and DM type II. Patient has had recurrent admissions for COPD exacerbation. Patient brought to ED as a stroke alert and was admitted with respiratory failure and pneumonia.    Renal Function:  SCr stable and within normal limits  BUN within normal limits  Urine output not well documented - appears to be adequate  Day(s) of therapy: 7 of 7 - last dose tomorrow AM.   Vancomycin concentration:   03/15 - 13.2mg/L ~ 9h post dose  3/18 -18.1 mg/L ~ 11h post dose; AUC

## 2024-03-19 NOTE — CONSULTS
hypercapnia (HCC) 03/13/2024    ANIVAL (obstructive sleep apnea) 03/01/2024    Hypersomnia 03/01/2024    Obesity (BMI 30-39.9) 03/01/2024    Diastolic heart failure (HCC) 02/13/2024    Primary hypertension 02/13/2024    Acute respiratory failure with hypoxia and hypercapnia ([facility]) 02/01/2024    Chronic respiratory failure with hypoxia and hypercapnia ([facility]) 01/12/2024    COPD exacerbation ([facility]) 01/07/2024    Lung nodule 04/16/2023    Acute exacerbation of chronic obstructive pulmonary disease (COPD) ([facility]) 03/23/2023    Restrictive lung disease 03/23/2023    Chronic obstructive pulmonary disease ([facility]) 01/17/2022    Former smoker 01/17/2022    Testicular swelling, left 08/03/2021    Uncontrolled type 2 diabetes mellitus with hyperglycemia ([facility]) 08/03/2021    Continuous dependence on cigarette smoking 04/14/2020    Secondary polycythemia 04/14/2020    Shortness of breath 07/02/2018     Past Medical History:   Diagnosis Date    Acute exacerbation of chronic obstructive pulmonary disease (COPD) ([facility]) 3/23/2023    Acute on chronic respiratory failure with hypoxemia ([facility]) 1/17/2022    Arthritis     Cataract     Chronic hypoxemic respiratory failure ([facility]) 6/30/2022    COPD (chronic obstructive pulmonary disease) ([facility])     COPD, severe ([facility]) 1/17/2022    Diabetes mellitus ([facility])     Enlarged prostate     Former smoker 1/17/2022    Hyperlipidemia     Hypertension     Restrictive lung disease 3/23/2023      Past Surgical History:   Procedure Laterality Date    COLONOSCOPY      SKIN BIOPSY      TONSILLECTOMY        History reviewed. No pertinent family history.   Infectious disease related family history - not contibutory.   SOCIAL HISTORY  Social History     Tobacco Use    Smoking status: Former     Current packs/day: 0.00     Average packs/day: 0.5 packs/day for 47.1 years (23.5 ttl pk-yrs)     Types: Cigarettes     Start date: 12/8/1973     Quit date: 1/1/2021     Years since quitting: 3.2    Smokeless tobacco: Never   Substance

## 2024-03-19 NOTE — PROGRESS NOTES
pulmonary      SUBJECTIVE:  he feeels better     OBJECTIVE    VITALS:  /60   Pulse 78   Temp 98.3 °F (36.8 °C) (Axillary)   Resp 16   Ht 1.778 m (5' 10\")   Wt 102.1 kg (225 lb)   SpO2 90%   BMI 32.28 kg/m²   HEAD AND FACE EXAM:  No throat injection, no active exudate,no thrush  NECK EXAM;No JVD, no masses, symmetrical  CHEST EXAM; Expansion equal and symmetrical, no masses  LUNG EXAM; Good breath sounds bilaterally. There are expiratory wheezes both lungs, there are crackles at both lung bases  CARDIOVASCULAR EXAM: Positive S1 and S2, no S3 or S4, no clicks ,no murmurs  RIGHT AND LEFT LOWER EXTRIMITY EXAM: No edema, no swelling, no inflamation  CNS EXAM: Alert and oriented X3          LABS   Lab Results   Component Value Date    WBC 7.0 03/19/2024    HGB 15.7 03/19/2024    HCT 49.8 03/19/2024    MCV 85.9 03/19/2024     03/19/2024     Lab Results   Component Value Date    CREATININE 0.5 (L) 03/19/2024    BUN 10 03/19/2024     03/19/2024    K 4.1 03/19/2024    CL 98 (L) 03/19/2024    CO2 33 (H) 03/19/2024     Lab Results   Component Value Date    INR 1.2 03/13/2024    PROTIME 15.1 (H) 03/13/2024          Lab Results   Component Value Date/Time    PHOS 3.0 02/02/2024 11:58 PM        Recent Labs     03/18/24  0600   PH 7.42   PO2ART 60*   EYP2ZAU 64.0*   O2SAT 86.7*         Wt Readings from Last 3 Encounters:   03/14/24 102.1 kg (225 lb)   03/01/24 102.5 kg (226 lb)   02/13/24 101.2 kg (223 lb)               ASSESMENT  Ac on ch resp failure  Pneumonia  Ac copd        PLAN  Bd rx  O2 adm  He qualified for home bipap    3/19/2024  London Augustine MD, M.D.

## 2024-03-19 NOTE — PROGRESS NOTES
Comprehensive Nutrition Assessment    Type and Reason for Visit:  Initial (los d 7)    Nutrition Recommendations/Plan:   Modify Diet to Carb Control 5     Malnutrition Assessment:  Malnutrition Status:  No malnutrition (03/19/24 1328)    Context:  Chronic Illness       Nutrition Assessment:    Pt admitted with acute on chronic respiratory failure with hypoxia and hypercapnia. H/O COPD, chronic respiratory failure on home oxygen, diabetes mellitus type 2, HLD, HTN. He is eating well here on a Low 2 gm Sodium Diet, will modify diet to Carb Control 5 with A1c up to 9. Provided Potassium Content of Foods (Nutrition Care Manual) in case needed. No recent wt loss noted. Will continue to follow as low nutrition risk.    Nutrition Related Findings:    Glu 146-385, A1c 9   Wound Type: None       Current Nutrition Intake & Therapies:    Average Meal Intake: %  Average Supplements Intake: None Ordered  ADULT DIET; Regular; Low Sodium (2 gm)    Anthropometric Measures:  Height: 177.8 cm (5' 10\")  Ideal Body Weight (IBW): 166 lbs (75 kg)    Admission Body Weight: 102.5 kg (225 lb 15.5 oz)  Current Body Weight: 102.1 kg (225 lb 1.4 oz),   IBW.    Current BMI (kg/m2): 32.3  Usual Body Weight: 95.3 kg (210 lb 2 oz) (3/28/23)  % Weight Change (Calculated): 7.1                    BMI Categories: Obese Class 1 (BMI 30.0-34.9)      Nutrition Diagnosis:   Limited adherence to nutrition-related recommendations related to endocrine dysfuntion as evidenced by lab values    Nutrition Interventions:   Food and/or Nutrient Delivery: Modify Current Diet  Nutrition Education/Counseling: Education initiated  Coordination of Nutrition Care: Continue to monitor while inpatient  Plan of Care discussed with: Pt    Goals:     Goals: Meet at least 75% of estimated needs       Nutrition Monitoring and Evaluation:   Behavioral-Environmental Outcomes: None Identified  Food/Nutrient Intake Outcomes: Food and Nutrient Intake  Physical Signs/Symptoms  Outcomes: Biochemical Data, Fluid Status or Edema, Meal Time Behavior, Weight    Discharge Planning:    Recommend pursue outpatient diabetes education     Nisa Lyons RD, LD  Contact: [phone]

## 2024-03-19 NOTE — PROGRESS NOTES
v    V2.0    [employer] Progress Note      Name:  Jeromy Bojorquez /Age/Sex: 1960  (63 y.o. male)   MRN & CSN:  [id number] & [id number] Encounter Date/Time: 3/19/2024 8:30 AM EDT   [facility]  [facility] PCP: No primary care provider on file.     Attending:Malina Kent MD       Hospital Day: 8    Assessment and Recommendations   Jeromy Bojorquez is a 63 y.o. male with pmh of  who presents with Acute on chronic respiratory failure with hypoxia and hypercapnia (HCC)      Plan:     #.  Acute on chronic respiratory failure with hypoxia and hypercapnia  -Chest x-ray-mild left basilar airspace disease  -CTA chest-patchy bilateral airspace disease, elevated right hemidiaphragm.  -Patient started on BiPAP, now weaned to nasal cannula, currently on to L O2.  -Consult pulmonology, appreciate recs  -Pt qualifyingfor Home BiPAP, pulm to set up    #.  MRSA multifocal pneumonia:   -Patient received IV fluids as per sepsis protocol, got broad-spectrum antibiotics  -Continue broad-spectrum antibiotics-vancomycin, cefepime.  -Urine Streptococcus pneumonia antigen, Legionella antigen, sputum culture.  -Pt s/p IV Vanco/ Cefepme  -Blood cultures drawn in ED negative  -ID consulted, apprecite recs, IV antibiotics converted to p.o. linezolid to complete 14-day course EOT 2024.  -CXR 3/18 w patchy pneumonia w possible worsening    #Hyperkalemia  -pt's K showing above 6 0n 3/14 , w hemolysed sample, repeat K 5.5, pt s/p hyperkalemia treament w Insulin/ D50/ Lokelma x 1  with improvement to 4.8  -Potassium noted to be 5.6 on 3/18, given hyperkalemia cocktail, recheck today potassium 4.1  -Low K diet  -Will hold losartan     #.  Bilateral lower extremity weakness  -There was a stroke alert initially.  -CT head, CTA head and neck-no acute process.  -ED physician discussed with [facility] stroke team and felt patient was not a TNK candidate, overall presentation was likely secondary to hypercapnic respiratory failure.  -PT/OT  Value Date/Time    LABA1C 9.0 01/07/2024 10:34 AM     TSH: No results found for: \"TSH\"  Troponin:   Lab Results   Component Value Date/Time    TROPONINT 0.018 07/03/2018 04:29 PM    TROPONINT 0.030 07/03/2018 12:36 PM    TROPONINT 0.027 07/02/2018 07:53 PM     Lactic Acid: No results for input(s): \"LACTA\" in the last 72 hours.  BNP:   No results for input(s): \"PROBNP\" in the last 72 hours.    UA:  Lab Results   Component Value Date/Time    NITRU NEGATIVE 02/01/2024 08:54 PM    COLORU YELLOW 02/01/2024 08:54 PM    WBCUA 1 02/01/2024 08:54 PM    RBCUA 2 02/01/2024 08:54 PM    MUCUS FEW 02/01/2024 08:54 PM    TRICHOMONAS NONE SEEN 02/01/2024 08:54 PM    BACTERIA NEGATIVE 02/01/2024 08:54 PM    CLARITYU CLEAR 02/01/2024 08:54 PM    SPECGRAV >1.030 02/01/2024 08:54 PM    LEUKOCYTESUR NEGATIVE 02/01/2024 08:54 PM    UROBILINOGEN 1.0 02/01/2024 08:54 PM    BILIRUBINUR MODERATE NUMBER OR AMOUNT OBSERVED 02/01/2024 08:54 PM    BLOODU NEGATIVE 02/01/2024 08:54 PM    KETUA 15 02/01/2024 08:54 PM     Urine Cultures: No results found for: \"LABURIN\"  Blood Cultures: No results found for: \"BC\"  No results found for: \"BLOODCULT2\"  Organism: No results found for: \"ORG\"      Electronically signed by Malina Kent MD on 3/19/2024 at 8:49 AM

## 2024-03-20 VITALS
RESPIRATION RATE: 26 BRPM | DIASTOLIC BLOOD PRESSURE: 70 MMHG | SYSTOLIC BLOOD PRESSURE: 130 MMHG | WEIGHT: 225 LBS | HEIGHT: 70 IN | TEMPERATURE: 97.9 F | OXYGEN SATURATION: 94 % | HEART RATE: 92 BPM | BODY MASS INDEX: 32.21 KG/M2

## 2024-03-20 LAB
ALBUMIN SERPL-MCNC: 3.4 GM/DL (ref 3.4–5)
ALP BLD-CCNC: 52 IU/L (ref 40–128)
ALT SERPL-CCNC: 22 U/L (ref 10–40)
ANION GAP SERPL CALCULATED.3IONS-SCNC: 9 MMOL/L (ref 7–16)
AST SERPL-CCNC: 14 IU/L (ref 15–37)
BASOPHILS ABSOLUTE: 0.1 K/CU MM
BASOPHILS RELATIVE PERCENT: 1.1 % (ref 0–1)
BILIRUB SERPL-MCNC: 0.8 MG/DL (ref 0–1)
BUN SERPL-MCNC: 10 MG/DL (ref 6–23)
CALCIUM SERPL-MCNC: 8.6 MG/DL (ref 8.3–10.6)
CHLORIDE BLD-SCNC: 96 MMOL/L (ref 99–110)
CO2: 35 MMOL/L (ref 21–32)
CREAT SERPL-MCNC: 0.7 MG/DL (ref 0.9–1.3)
DIFFERENTIAL TYPE: ABNORMAL
EOSINOPHILS ABSOLUTE: 0.2 K/CU MM
EOSINOPHILS RELATIVE PERCENT: 2.4 % (ref 0–3)
GFR SERPL CREATININE-BSD FRML MDRD: >60 ML/MIN/1.73M2
GLUCOSE BLD-MCNC: 149 MG/DL (ref 70–99)
GLUCOSE BLD-MCNC: 264 MG/DL (ref 70–99)
GLUCOSE SERPL-MCNC: 163 MG/DL (ref 70–99)
HCT VFR BLD CALC: 51.4 % (ref 42–52)
HEMOGLOBIN: 15.7 GM/DL (ref 13.5–18)
IMMATURE NEUTROPHIL %: 0.3 % (ref 0–0.43)
LYMPHOCYTES ABSOLUTE: 1.5 K/CU MM
LYMPHOCYTES RELATIVE PERCENT: 18.8 % (ref 24–44)
MCH RBC QN AUTO: 26.2 PG (ref 27–31)
MCHC RBC AUTO-ENTMCNC: 30.5 % (ref 32–36)
MCV RBC AUTO: 85.8 FL (ref 78–100)
MONOCYTES ABSOLUTE: 1.4 K/CU MM
MONOCYTES RELATIVE PERCENT: 17.3 % (ref 0–4)
NUCLEATED RBC %: 0 %
PDW BLD-RTO: 16.8 % (ref 11.7–14.9)
PLATELET # BLD: 212 K/CU MM (ref 140–440)
PMV BLD AUTO: 9.4 FL (ref 7.5–11.1)
POTASSIUM SERPL-SCNC: 3.8 MMOL/L (ref 3.5–5.1)
RBC # BLD: 5.99 M/CU MM (ref 4.6–6.2)
SEGMENTED NEUTROPHILS ABSOLUTE COUNT: 4.8 K/CU MM
SEGMENTED NEUTROPHILS RELATIVE PERCENT: 60.1 % (ref 36–66)
SODIUM BLD-SCNC: 140 MMOL/L (ref 135–145)
TOTAL IMMATURE NEUTOROPHIL: 0.02 K/CU MM
TOTAL NUCLEATED RBC: 0 K/CU MM
TOTAL PROTEIN: 5.5 GM/DL (ref 6.4–8.2)
WBC # BLD: 7.9 K/CU MM (ref 4–10.5)

## 2024-03-20 PROCEDURE — 80053 COMPREHEN METABOLIC PANEL: CPT

## 2024-03-20 PROCEDURE — 99232 SBSQ HOSP IP/OBS MODERATE 35: CPT | Performed by: NURSE PRACTITIONER

## 2024-03-20 PROCEDURE — 94640 AIRWAY INHALATION TREATMENT: CPT

## 2024-03-20 PROCEDURE — 6370000000 HC RX 637 (ALT 250 FOR IP): Performed by: INTERNAL MEDICINE

## 2024-03-20 PROCEDURE — 85025 COMPLETE CBC W/AUTO DIFF WBC: CPT

## 2024-03-20 PROCEDURE — 82962 GLUCOSE BLOOD TEST: CPT

## 2024-03-20 PROCEDURE — 6370000000 HC RX 637 (ALT 250 FOR IP): Performed by: NURSE PRACTITIONER

## 2024-03-20 PROCEDURE — 2700000000 HC OXYGEN THERAPY PER DAY

## 2024-03-20 PROCEDURE — 2580000003 HC RX 258: Performed by: INTERNAL MEDICINE

## 2024-03-20 PROCEDURE — 6360000002 HC RX W HCPCS: Performed by: INTERNAL MEDICINE

## 2024-03-20 PROCEDURE — 94618 PULMONARY STRESS TESTING: CPT

## 2024-03-20 PROCEDURE — 36415 COLL VENOUS BLD VENIPUNCTURE: CPT

## 2024-03-20 PROCEDURE — 94761 N-INVAS EAR/PLS OXIMETRY MLT: CPT

## 2024-03-20 RX ORDER — LINEZOLID 600 MG/1
600 TABLET, FILM COATED ORAL EVERY 12 HOURS SCHEDULED
Qty: 11 TABLET | Refills: 0 | Status: SHIPPED | OUTPATIENT
Start: 2024-03-20 | End: 2024-03-26

## 2024-03-20 RX ORDER — POLYETHYLENE GLYCOL 3350 17 G/17G
17 POWDER, FOR SOLUTION ORAL DAILY PRN
Qty: 30 PACKET | Refills: 0 | Status: SHIPPED | OUTPATIENT
Start: 2024-03-20 | End: 2024-04-19

## 2024-03-20 RX ORDER — SENNA AND DOCUSATE SODIUM 50; 8.6 MG/1; MG/1
2 TABLET, FILM COATED ORAL 2 TIMES DAILY PRN
Qty: 90 TABLET | Refills: 0 | Status: SHIPPED | OUTPATIENT
Start: 2024-03-20 | End: 2024-04-19

## 2024-03-20 RX ADMIN — MUPIROCIN: 20 OINTMENT TOPICAL at 09:14

## 2024-03-20 RX ADMIN — SODIUM CHLORIDE, PRESERVATIVE FREE 10 ML: 5 INJECTION INTRAVENOUS at 09:14

## 2024-03-20 RX ADMIN — LINEZOLID 600 MG: 600 TABLET, FILM COATED ORAL at 09:14

## 2024-03-20 RX ADMIN — ENOXAPARIN SODIUM 30 MG: 100 INJECTION SUBCUTANEOUS at 09:14

## 2024-03-20 RX ADMIN — IPRATROPIUM BROMIDE AND ALBUTEROL SULFATE 1 DOSE: 2.5; .5 SOLUTION RESPIRATORY (INHALATION) at 07:50

## 2024-03-20 RX ADMIN — INSULIN LISPRO 4 UNITS: 100 INJECTION, SOLUTION INTRAVENOUS; SUBCUTANEOUS at 11:28

## 2024-03-20 NOTE — PROGRESS NOTES
03/20/24 1505   Encounter Summary   Encounter Overview/Reason  Initial Encounter   Service Provided For: Patient   [facility]   Support System Family members   Last Encounter  03/20/24  (PT going home soon, no concerns at the moment.)   Complexity of Encounter Low   Begin Time 1502   End Time  1505   Total Time Calculated 3 min   Assessment/Intervention/Outcome   Assessment Unable to assess   Plan and Referrals   Plan/Referrals Continue Support (comment)

## 2024-03-20 NOTE — FLOWSHEET NOTE
Patient states he will need transportation home as his friend is out of town and patients truck is broke down; will set up with [employer] once all of dc is coordinated

## 2024-03-20 NOTE — DISCHARGE INSTR - DIET
Good nutrition is important when healing from an illness, injury, or surgery.  Follow any nutrition recommendations given to you during your hospital stay.   If you were given an oral nutrition supplement while in the hospital, continue to take this supplement at home.  You can take it with meals, in-between meals, and/or before bedtime. These supplements can be purchased at most local grocery stores, pharmacies, and chain [employer]-stores.   If you have any questions about your diet or nutrition, call the hospital and ask for the dietitian.    Resume pre-admission diet as tolerated

## 2024-03-20 NOTE — PROGRESS NOTES
Collection Time: 03/19/24 10:34 PM   Result Value Ref Range    POC Glucose 188 (H) 70 - 99 MG/DL   CBC with Auto Differential    Collection Time: 03/20/24  1:42 AM   Result Value Ref Range    WBC 7.9 4.0 - 10.5 K/CU MM    RBC 5.99 4.6 - 6.2 M/CU MM    Hemoglobin 15.7 13.5 - 18.0 GM/DL    Hematocrit 51.4 42 - 52 %    MCV 85.8 78 - 100 FL    MCH 26.2 (L) 27 - 31 PG    MCHC 30.5 (L) 32.0 - 36.0 %    RDW 16.8 (H) 11.7 - 14.9 %    Platelets 212 140 - 440 K/CU MM    MPV 9.4 7.5 - 11.1 FL    Differential Type AUTOMATED DIFFERENTIAL     Segs Relative 60.1 36 - 66 %    Lymphocytes % 18.8 (L) 24 - 44 %    Monocytes % 17.3 (H) 0 - 4 %    Eosinophils % 2.4 0 - 3 %    Basophils % 1.1 (H) 0 - 1 %    Segs Absolute 4.8 K/CU MM    Lymphocytes Absolute 1.5 K/CU MM    Monocytes Absolute 1.4 K/CU MM    Eosinophils Absolute 0.2 K/CU MM    Basophils Absolute 0.1 K/CU MM    Nucleated RBC % 0.0 %    Total Nucleated RBC 0.0 K/CU MM    Total Immature Neutrophil 0.02 K/CU MM    Immature Neutrophil % 0.3 0 - 0.43 %   Comprehensive Metabolic Panel    Collection Time: 03/20/24  1:42 AM   Result Value Ref Range    Sodium 140 135 - 145 MMOL/L    Potassium 3.8 3.5 - 5.1 MMOL/L    Chloride 96 (L) 99 - 110 mMol/L    CO2 35 (H) 21 - 32 MMOL/L    Anion Gap 9 7 - 16    Glucose 163 (H) 70 - 99 MG/DL    BUN 10 6 - 23 MG/DL    Creatinine 0.7 (L) 0.9 - 1.3 MG/DL    Est, Glom Filt Rate >60 >60 mL/min/1.73m2    Calcium 8.6 8.3 - 10.6 MG/DL    Total Protein 5.5 (L) 6.4 - 8.2 GM/DL    Albumin 3.4 3.4 - 5.0 GM/DL    Total Bilirubin 0.8 0.0 - 1.0 MG/DL    Alkaline Phosphatase 52 40 - 128 IU/L    ALT 22 10 - 40 U/L    AST 14 (L) 15 - 37 IU/L   POCT Glucose    Collection Time: 03/20/24  7:18 AM   Result Value Ref Range    POC Glucose 149 (H) 70 - 99 MG/DL     CULTURE results: Invalid input(s): \"BLOOD CULTURE\", \"URINE CULTURE\", \"SURGICAL CULTURE\"    Diagnosis:  Patient Active Problem List   Diagnosis    Shortness of breath    Continuous dependence on cigarette  smoking    Secondary polycythemia    Testicular swelling, left    Uncontrolled type 2 diabetes mellitus with hyperglycemia (HCC)    Chronic obstructive pulmonary disease (HCC)    Former smoker    Controlled type 2 diabetes mellitus without complication, without long-term current use of insulin (HCC)    Callus    Hypoxia    Epistaxis    Acute exacerbation of chronic obstructive pulmonary disease (COPD) (HCC)    Restrictive lung disease    Lung nodule    COPD exacerbation (HCC)    Chronic respiratory failure with hypoxia and hypercapnia (HCC)    Acute respiratory failure with hypoxia and hypercapnia (HCC)    Diastolic heart failure (HCC)    Primary hypertension    ANIVAL (obstructive sleep apnea)    Hypersomnia    Obesity (BMI 30-39.9)    Acute on chronic respiratory failure with hypoxia and hypercapnia (HCC)       Active Problems  Principal Problem:    Acute on chronic respiratory failure with hypoxia and hypercapnia (HCC)  Resolved Problems:    * No resolved hospital problems. *    Electronically signed by: Electronically signed by MIGUEL Spangler CNP on 3/20/2024 at 9:07 AM

## 2024-03-20 NOTE — FLOWSHEET NOTE
Patient taken to OP pharmacy to  meds, then to ED lobby for security to arrange [employer] as form would not fax from unit.

## 2024-03-20 NOTE — PROGRESS NOTES
Patient was seen in hospital for  COPD, CHF.  I am prescribing oxygen because the diagnosis and testing requires the patient to have oxygen in the home.  Conditions will improve or be benefited by oxygen use.  The patient is able to perform good mobility and therefore requires the use of a portable oxygen system for ambulation.

## 2024-03-20 NOTE — PROGRESS NOTES
pulmonary      SUBJECTIVE:  on bipap and sleeping     OBJECTIVE    VITALS:  BP (!) 104/50   Pulse 87   Temp 97.3 °F (36.3 °C) (Oral)   Resp 25   Ht 1.778 m (5' 10\")   Wt 102.1 kg (225 lb)   SpO2 95%   BMI 32.28 kg/m²   HEAD AND FACE EXAM:  No throat injection, no active exudate,no thrush  NECK EXAM;No JVD, no masses, symmetrical  CHEST EXAM; Expansion equal and symmetrical, no masses  LUNG EXAM; Good breath sounds bilaterally. There are expiratory wheezes both lungs, there are crackles at both lung bases  CARDIOVASCULAR EXAM: Positive S1 and S2, no S3 or S4, no clicks ,no murmurs  RIGHT AND LEFT LOWER EXTRIMITY EXAM: No edema, no swelling, no inflamation            LABS   Lab Results   Component Value Date    WBC 7.9 03/20/2024    HGB 15.7 03/20/2024    HCT 51.4 03/20/2024    MCV 85.8 03/20/2024     03/20/2024     Lab Results   Component Value Date    CREATININE 0.7 (L) 03/20/2024    BUN 10 03/20/2024     03/20/2024    K 3.8 03/20/2024    CL 96 (L) 03/20/2024    CO2 35 (H) 03/20/2024     Lab Results   Component Value Date    INR 1.2 03/13/2024    PROTIME 15.1 (H) 03/13/2024          Lab Results   Component Value Date/Time    PHOS 3.0 02/02/2024 11:58 PM        Recent Labs     03/18/24  0600   PH 7.42   PO2ART 60*   DIL1VTG 64.0*   O2SAT 86.7*         Wt Readings from Last 3 Encounters:   03/14/24 102.1 kg (225 lb)   03/01/24 102.5 kg (226 lb)   02/13/24 101.2 kg (223 lb)               ASSESMENT  Ac on ch resp failure  Pneumonia  copd        PLAN  Bd rx  Bipap at night  O2 during the day    3/20/2024  London Augustine MD, MJONATHAN.

## 2024-03-20 NOTE — FLOWSHEET NOTE
Attempted to call [facility]/[phone] prior to dc, they are currently closed for lunch at this time.  Will re-attempt at 1230 when they reopen

## 2024-03-20 NOTE — DISCHARGE SUMMARY
V2.0  Discharge Summary    Name:  Jeromy Bojorquez /Age/Sex: 1960 (63 y.o. male)   Admit Date: 3/12/2024  Discharge Date: 3/20/24    MRN & CSN:  [id number] & [id number] Encounter Date and Time 3/20/24 11:29 AM EDT    Attending:  Malina Kent MD Discharging Provider: Malina Kent MD       Hospital Course:     Brief HPI: Jeromy Bojorquez is a 63 y.o. male who presented with acute on chronic hypoxic respiratory failure.    Brief Problem Based Course:   #.  Acute on chronic respiratory failure with hypoxia and hypercapnia  -Chest x-ray-mild left basilar airspace disease  -CTA chest-patchy bilateral airspace disease, elevated right hemidiaphragm.  -Patient started on BiPAP, now weaned to nasal cannula, currently on to 2L O2.  -Consult pulmonology, appreciate recs  -Pt qualified for Home BiPAP, RT to set up.  -Home O2 eval prior to d/c.     #.  MRSA multifocal pneumonia:   -Patient received IV fluids as per sepsis protocol, got broad-spectrum antibiotics  -Continue broad-spectrum antibiotics-vancomycin, cefepime.  -Urine Streptococcus pneumonia antigen, Legionella antigen, sputum culture.  -Pt s/p IV Vanco/ Cefepme  -Blood cultures drawn in ED negative  -CXR 3/18 w patchy pneumonia w possible worsening.   -ID consulted, apprecite recs, IV antibiotics converted to p.o. linezolid to complete 14-day course EOT 2024.     #Hyperkalemia - resolved  -pt's K showing above 6 0n 3/14 , w hemolysed sample, repeat K 5.5, pt s/p hyperkalemia treament w Insulin/ D50/ Lokelma x 1  with improvement to 4.8  -Potassium noted to be 5.6 on 3/18, given hyperkalemia cocktail, recheck today potassium 4.1  -Low K diet  -Will hold losartan, BP is well controlled. Will continue to hold on d/c. Advised patient to f/u with PCP.      #.  Bilateral lower extremity weakness  -There was a stroke alert initially.  -CT head, CTA head and neck-no acute process.  -ED physician discussed with [facility] stroke team and felt patient was not a TNK      OneTouch Ultra strip  Generic drug: blood glucose test strips  TEST 3 TIMES A DAY AND AS NEEDED FOR SYMPTOMS OF IRREGULAR BLOOD SUAGR  What changed: Another medication with the same name was removed. Continue taking this medication, and follow the directions you see here.     umeclidinium-vilanterol 62.5-25 MCG/ACT inhaler  Commonly known as: Anoro Ellipta  Inhale 1 puff into the lungs daily  What changed: medication strength            CONTINUE taking these medications      blood glucose monitor kit and supplies  Use 3-4 times daily     glimepiride 2 MG tablet  Commonly known as: AMARYL  TAKE 1 TABLET BY MOUTH TWICE A DAY     Lancets Misc  Use one lancet 4 times daily     metFORMIN 500 MG tablet  Commonly known as: GLUCOPHAGE     predniSONE 5 MG tablet  Commonly known as: DELTASONE            STOP taking these medications      losartan 25 MG tablet  Commonly known as: COZAAR            ASK your doctor about these medications      albuterol sulfate  (90 Base) MCG/ACT inhaler  Commonly known as: Ventolin HFA  Inhale 2 puffs into the lungs every 6 hours as needed for Wheezing               Where to Get Your Medications        These medications were sent to [address] - P [phone] - F [phone]  [address] [place] [id number]      Phone: [phone]   linezolid 600 MG tablet  mupirocin 2 % ointment  polyethylene glycol 17 g packet  sennosides-docusate sodium 8.6-50 MG tablet  umeclidinium-vilanterol 62.5-25 MCG/ACT inhaler        Objective Findings at Discharge:   BP (!) 109/59   Pulse 92   Temp 97.9 °F (36.6 °C) (Oral)   Resp 26   Ht 1.778 m (5' 10\")   Wt 102.1 kg (225 lb)   SpO2 93%   BMI 32.28 kg/m²       Physical Exam:   Physical Exam  Vitals and nursing note reviewed.   Constitutional:       General: He is not in acute distress.     Appearance: He is obese. He is not ill-appearing.   HENT:      Head:

## 2024-03-20 NOTE — PROGRESS NOTES
Outpatient Pharmacy Progress Note for Meds-to-Beds    Total number of Prescriptions Filled: 5  The following medications were dispensed to the patient during the discharge process:  linezolid  mupirocin  polyethylene glycol  sennosides-docusate sodium  umeclidinium-vilanterol    Additional Documentation:  Patient's family member picked-up the medication(s) in the OP Pharmacy      Thank you for letting us serve your patients.  [place] [id number]    Phone: [phone]    Fax: [phone]

## 2024-03-20 NOTE — FLOWSHEET NOTE
Called [facility] regarding bipap, states that patient would need to  bipap, however, notified that patient does not have transportation d/t his truck being broke down.  They are going to call me back regarding this.  Patient is okay to be discharged home with a regular oxygen tank and needs to call the DME store after he gets home so they can  the tank.  Will notify pulmonary.

## 2024-03-20 NOTE — PROGRESS NOTES
3/20/2024 11:19 AM  Patient Room #: 3106/3106-A  Patient Name: Jeromy Bojorquez    (Step 1 Done by RN if possible otherwise call Pulmonary Diagnostics)  Place patient on room air at rest for at least 30 minutes.  If patient falls below 88% before 30 minutes then you can record the level and stop.  Record room air saturation level _87_ %.  If patient is at 88% or below, they will qualify for home oxygen and you can stop.  If level does not fall below 88%, fill in level above. If indicated continue to Step 2.   Signature:_Nimesh Rabago RRT____ Date: _03/20/2024__  (Step 2&3 Done by P)  Ambulate patient on room air until saturation falls below 89%.  Record level of room air saturation with ambulation___ %.  Next, place patient back on ___lpm oxygen and ambulate, record level __%.  (Note:  this level must show improvement from room air level done with ambulation.)  If patient’s saturation on room air with ambulation is 88% or below AND patient shows improvement with oxygen during ambulation, they will qualify for home oxygen and you can stop.  If patient does not drop below 89%, then patient should have an overnight oximetry trending on room air to see if level falls below 88%.  Complete level in Step 3 below.    Room air overnight oximetry level 88 % for___  cumulative minutes.  If patient’s room air oxygen level is below <89% for any amount of time they will qualify for nocturnal home oxygen.        (Attach Night Trending Report)    Complete order below: Diagnosis:_COPD, CHF___  Home oxygen at:  Length of Need: ?X Lifetime ? 3 Months     _2__lpm or __%   via  [x] nasal cannula  []mask  [] other         [x]continuous [x]  with activity  [x]  Nocturnal   [x] Portable Tanks [x]  Concentrator  [x] Conserving Device        Therapist Signature:_Nimesh Rabago RRT______     Date:  _03/20/2024__  Physician Signature:  __Electronically Signed in EMR_    Date:___  Physician Printed Name:  __Mlaina Kent MD

## 2024-03-20 NOTE — PROGRESS NOTES
Pt already has home oxygen, but [facility] wanted the pt re tested. The Home Oxygen Evaluation is complete. The pt qualified.  The pt will need to bring his oxygen from home for discharge.

## 2024-03-20 NOTE — CARE COORDINATION
Received referral from [facility] pharmacy to assist with medication cost(s) at time of discharge.  Approved a 1x voucher for three medications and faxed voucher to [facility] pharmacy.    Added patient to the flagged list. If pt requests additional help a Med Assist application must be completed and required documents provided to determine eligibility for ongoing assistance.

## 2024-03-20 NOTE — FLOWSHEET NOTE
Patient scheduled to  bipap at [facility] at 1700 today, if he is not able to make it, instructed to call and let them know so he can reschedule

## 2024-03-20 NOTE — PROGRESS NOTES
Gave pt O2 tank and explained how to use it. [facility]

## 2024-03-23 PROBLEM — J15.212 PNEUMONIA OF BOTH LOWER LOBES DUE TO METHICILLIN RESISTANT STAPHYLOCOCCUS AUREUS (MRSA) (HCC): Status: ACTIVE | Noted: 2024-03-23

## 2024-03-23 NOTE — PROGRESS NOTES
Physician Progress Note      PATIENT:               ALDA POSADAS  [facility] #:                  [id number]  :                       1960  ADMIT DATE:       3/12/2024 11:38 PM  DISCH DATE:        3/20/2024 3:06 PM  RESPONDING  PROVIDER #:        Malina Kent MD          QUERY TEXT:    Patient admitted with acute on chronic respiratory failure with hypoxia and   hypercapnia. Documentation reflects \"Patient met sepsis criteria given his   tachycardia and tachypnea which could be secondary to  COPD exacerbation.\" No leukocytosis documented, afebrile. If possible, please   document in the progress notes and discharge summary if sepsis was:    The medical record reflects the following:  Risk Factors: MRSA pneumonia  Clinical Indicators: Tmax 99.9, WBC 7.6, procal 0.043  Treatment: IVF, IV Zithromax, IV Maxipime, Zyvox, IV Solumedrol, IV Vancomycin    MOISES McconnellN, RN, CRCR  Clinical   [phone]  Options provided:  -- Sepsis present as evidenced by, Please document evidence.  -- Sepsis ruled out after study  -- Other - I will add my own diagnosis  -- Disagree - Not applicable / Not valid  -- Disagree - Clinically unable to determine / Unknown  -- Refer to Clinical Documentation Reviewer    PROVIDER RESPONSE TEXT:    Sepsis was ruled out after study.    Query created by: Veena Ferguson on 3/22/2024 9:47 AM      Electronically signed by:  Malina Kent MD 3/22/2024 11:33 PM

## 2024-03-25 ENCOUNTER — OFFICE VISIT (OUTPATIENT)
Dept: PULMONOLOGY | Age: 64
End: 2024-03-25
Payer: MEDICARE

## 2024-03-25 VITALS
WEIGHT: 225 LBS | OXYGEN SATURATION: 90 % | RESPIRATION RATE: 16 BRPM | HEART RATE: 96 BPM | HEIGHT: 70 IN | BODY MASS INDEX: 32.21 KG/M2

## 2024-03-25 DIAGNOSIS — J15.212 PNEUMONIA OF BOTH LOWER LOBES DUE TO METHICILLIN RESISTANT STAPHYLOCOCCUS AUREUS (MRSA) (HCC): Primary | ICD-10-CM

## 2024-03-25 DIAGNOSIS — Z87.891 FORMER SMOKER: ICD-10-CM

## 2024-03-25 DIAGNOSIS — J44.0 CHRONIC OBSTRUCTIVE PULMONARY DISEASE WITH ACUTE LOWER RESPIRATORY INFECTION (HCC): ICD-10-CM

## 2024-03-25 DIAGNOSIS — G47.33 OSA (OBSTRUCTIVE SLEEP APNEA): ICD-10-CM

## 2024-03-25 DIAGNOSIS — J96.11 CHRONIC RESPIRATORY FAILURE WITH HYPOXIA AND HYPERCAPNIA (HCC): ICD-10-CM

## 2024-03-25 DIAGNOSIS — J96.12 CHRONIC RESPIRATORY FAILURE WITH HYPOXIA AND HYPERCAPNIA (HCC): ICD-10-CM

## 2024-03-25 PROCEDURE — 1111F DSCHRG MED/CURRENT MED MERGE: CPT | Performed by: INTERNAL MEDICINE

## 2024-03-25 PROCEDURE — 3017F COLORECTAL CA SCREEN DOC REV: CPT | Performed by: INTERNAL MEDICINE

## 2024-03-25 PROCEDURE — G8484 FLU IMMUNIZE NO ADMIN: HCPCS | Performed by: INTERNAL MEDICINE

## 2024-03-25 PROCEDURE — 1036F TOBACCO NON-USER: CPT | Performed by: INTERNAL MEDICINE

## 2024-03-25 PROCEDURE — 3023F SPIROM DOC REV: CPT | Performed by: INTERNAL MEDICINE

## 2024-03-25 PROCEDURE — G8427 DOCREV CUR MEDS BY ELIG CLIN: HCPCS | Performed by: INTERNAL MEDICINE

## 2024-03-25 PROCEDURE — 99213 OFFICE O/P EST LOW 20 MIN: CPT | Performed by: INTERNAL MEDICINE

## 2024-03-25 PROCEDURE — G8417 CALC BMI ABV UP PARAM F/U: HCPCS | Performed by: INTERNAL MEDICINE

## 2024-03-25 RX ORDER — PREDNISONE 5 MG/1
TABLET ORAL
Qty: 38 TABLET | Refills: 0 | Status: SHIPPED | OUTPATIENT
Start: 2024-03-25

## 2024-03-25 NOTE — PROGRESS NOTES
SUBJECTIVE:  Chief Complaint: Posthospitalization follow-up for MRSA pneumonia, advanced COPD, chronic hypoxemic respiratory failure, restrictive lung disease, shortness of breath, former smoker  Mr. Bojorquez was recently discharged from [facility] after being diagnosed with MRSA pneumonia and is on oral therapy now using linezolid.  He states that he has 3 days left before he finished his antibiotics.  He continues on Anoro Ellipta and albuterol rescue inhaler and does wear oxygen 24 hours a day.  He states that he is still short of breath but he is not having chest pain or chest discomfort.  He denies fever or chills presently.  He is not smoking      ROS:  Constitution:  HEENT: Negative for ear, throat pain  Cardiovascular: Negative for chest pain, syncope, edema  Pulmonary: See HPI  Musculoskeletal: Negative for DVT, myalgias, arthralgias    OBJECTIVE:  Pulse 96   Resp 16   Ht 1.778 m (5' 10\")   Wt 102.1 kg (225 lb)   SpO2 90% Comment: portable o2 on 2LPM  BMI 32.28 kg/m²      Physical Exam:  Constitutional:  He appears well developed and well-nourished.  Wearing nasal oxygen and does appear to be mildly short of breath even at rest.  Neck:  Supple, No palpable lymphadenopathy, No JVD, no wheezing or stridor over the neck  Cardiovascular:  S1, S2 Normal, Regular rhythm, no murmurs or gallops, No pericardial  rubs.  Pulmonary: Breath sounds are diminished bilaterally with scattered extra wheezes and a few rhonchi at the lung bases.  No pleural rubs are noted  Abdomen: Not examined  Extremities: no edema, No DVT  Neurologic: Oriented x3, No focal deficits    Radiology: CT chest with angiography on 3/13/2024 showed no evidence of pulm embolism with patchy bilateral airspace disease most suggestive of pneumonia with reactive adenopathy suspected.  PFT: Office spirometry on 6/6/2023 demonstrated no significant airways obstruction but because his FEV1 and FVC were both reduced I cannot rule out a restrictive lung process

## 2024-04-23 ENCOUNTER — OFFICE VISIT (OUTPATIENT)
Dept: ONCOLOGY | Age: 64
End: 2024-04-23
Payer: MEDICARE

## 2024-04-23 ENCOUNTER — HOSPITAL ENCOUNTER (OUTPATIENT)
Dept: INFUSION THERAPY | Age: 64
Discharge: HOME | End: 2024-04-23
Payer: MEDICARE

## 2024-04-23 VITALS
TEMPERATURE: 98 F | HEART RATE: 100 BPM | HEIGHT: 70 IN | DIASTOLIC BLOOD PRESSURE: 93 MMHG | SYSTOLIC BLOOD PRESSURE: 148 MMHG | WEIGHT: 210.8 LBS | BODY MASS INDEX: 30.18 KG/M2 | OXYGEN SATURATION: 99 %

## 2024-04-23 DIAGNOSIS — D75.1 SECONDARY POLYCYTHEMIA: Primary | ICD-10-CM

## 2024-04-23 DIAGNOSIS — F17.210 CONTINUOUS DEPENDENCE ON CIGARETTE SMOKING: ICD-10-CM

## 2024-04-23 DIAGNOSIS — R21 SKIN RASH: ICD-10-CM

## 2024-04-23 DIAGNOSIS — R91.1 LUNG NODULE: ICD-10-CM

## 2024-04-23 DIAGNOSIS — D75.1 SECONDARY POLYCYTHEMIA: ICD-10-CM

## 2024-04-23 LAB
BASOPHILS ABSOLUTE: 0.1 K/CU MM
BASOPHILS RELATIVE PERCENT: 0.7 % (ref 0–1)
DIFFERENTIAL TYPE: ABNORMAL
EOSINOPHILS ABSOLUTE: 0.2 K/CU MM
EOSINOPHILS RELATIVE PERCENT: 2.6 % (ref 0–3)
HCT VFR BLD CALC: 51.5 % (ref 42–52)
HEMOGLOBIN: 16.5 GM/DL (ref 13.5–18)
LYMPHOCYTES ABSOLUTE: 1.7 K/CU MM
LYMPHOCYTES RELATIVE PERCENT: 19.3 % (ref 24–44)
MCH RBC QN AUTO: 26.9 PG (ref 27–31)
MCHC RBC AUTO-ENTMCNC: 32 % (ref 32–36)
MCV RBC AUTO: 84 FL (ref 78–100)
MONOCYTES ABSOLUTE: 1.2 K/CU MM
MONOCYTES RELATIVE PERCENT: 13.4 % (ref 0–4)
NEUTROPHILS RELATIVE PERCENT: 64 % (ref 36–66)
PDW BLD-RTO: 19 % (ref 11.7–14.9)
PLATELET # BLD: 205 K/CU MM (ref 140–440)
PMV BLD AUTO: 10.1 FL (ref 7.5–11.1)
RBC # BLD: 6.13 M/CU MM (ref 4.6–6.2)
SEGMENTED NEUTROPHILS ABSOLUTE COUNT: 5.5 K/CU MM
WBC # BLD: 8.6 K/CU MM (ref 4–10.5)

## 2024-04-23 PROCEDURE — 36415 COLL VENOUS BLD VENIPUNCTURE: CPT

## 2024-04-23 PROCEDURE — 85025 COMPLETE CBC W/AUTO DIFF WBC: CPT

## 2024-04-23 PROCEDURE — 3077F SYST BP >= 140 MM HG: CPT | Performed by: INTERNAL MEDICINE

## 2024-04-23 PROCEDURE — 99211 OFF/OP EST MAY X REQ PHY/QHP: CPT

## 2024-04-23 PROCEDURE — 3080F DIAST BP >= 90 MM HG: CPT | Performed by: INTERNAL MEDICINE

## 2024-04-23 PROCEDURE — 99213 OFFICE O/P EST LOW 20 MIN: CPT | Performed by: INTERNAL MEDICINE

## 2024-04-23 NOTE — PROGRESS NOTES
MA Rooming Questions  Patient: Jeromy Bojorquez  MRN: [id number]    Date: 4/23/2024        1. Do you have any new issues?   no         2. Do you need any refills on medications?    no    3. Have you had any imaging done since your last visit?   no    4. Have you been hospitalized or seen in the emergency room since your last visit here?   yes - [facility] 3/12-3/20; 2/1-2/6; 1/6-1/9    5. Did the patient have a depression screening completed today? No    No data recorded     PHQ-9 Given to (if applicable):               PHQ-9 Score (if applicable):                     [] Positive     []  Negative              Does question #9 need addressed (if applicable)                     [] Yes    []  No               Brit Carroll MA      
nodules.  Dilated main pulmonary artery which can be seen with pulmonary hypertension.    PET CT scan done on 6/20/2022 showed no focal activity in the right lower lobe.  There was question of a nodule on the recent CT scan.  Findings are favored to be benign and related to resolving consolidation with probable atelectasis or scarring.  Attention on follow-up imaging.    CT chest on 10/19/23 showed redemonstration of chronic interstitial lung findings, predominantly in the upper lobes, overall favoring NSIP.  No honeycombing or bronchiectasis. Chronic subsegmental scarring in the right lung base.    On April 23, 2024, he presented to me for follow up. 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 He stated that his symptoms has improved some with phlebotomy. He has been under observation since then.     CT lung screening done on May 31, 2022 was reviewed with him. It showed nodular consolidation in RLL.     I reviewed with him findings on PET/CT scan done on 6/20/22. Since PET/CT scan findings are favored to be benign process, I recommend for observation.     Reviewed findings on CT scan done on 10/19/23. There is no evidence of suspicious lung lesion. He has been following with pulmonary and I recommend him to continue to f/u with pulmonary.     I recognized that his hematocrit today was 50.5% and I recommend for observation. He doesn't need phlebotomy this time. Since his symptoms improve with phlebotomy, I will recommend to keep his hematocrit less than 52%.     He stopped smoking since 1/1/2021. Otherwise, I will see him back in 6 months.     Skin rashes - he may have psoriasis. Will refer him to dermatology for further evaluation.    I answered all his questions and concerns for today. Recent imaging and labs were reviewed and discussed with the patient.

## 2024-04-24 ENCOUNTER — HOSPITAL ENCOUNTER (OUTPATIENT)
Dept: SLEEP CENTER | Age: 64
Discharge: HOME | End: 2024-04-24
Payer: MEDICARE

## 2024-04-24 DIAGNOSIS — G47.33 OSA (OBSTRUCTIVE SLEEP APNEA): ICD-10-CM

## 2024-04-24 PROCEDURE — 95810 POLYSOM 6/> YRS 4/> PARAM: CPT

## 2024-04-29 ENCOUNTER — HOSPITAL ENCOUNTER (OUTPATIENT)
Age: 64
Discharge: HOME | End: 2024-04-29
Payer: MEDICARE

## 2024-04-29 LAB
ALBUMIN SERPL-MCNC: 4.3 GM/DL (ref 3.4–5)
ALP BLD-CCNC: 70 IU/L (ref 40–128)
ALT SERPL-CCNC: 9 U/L (ref 10–40)
ANION GAP SERPL CALCULATED.3IONS-SCNC: 9 MMOL/L (ref 7–16)
AST SERPL-CCNC: 15 IU/L (ref 15–37)
BILIRUB SERPL-MCNC: 0.7 MG/DL (ref 0–1)
BUN SERPL-MCNC: 11 MG/DL (ref 6–23)
CALCIUM SERPL-MCNC: 9 MG/DL (ref 8.3–10.6)
CHLORIDE BLD-SCNC: 97 MMOL/L (ref 99–110)
CHOLESTEROL, FASTING: 167 MG/DL
CO2: 34 MMOL/L (ref 21–32)
CREAT SERPL-MCNC: 0.7 MG/DL (ref 0.9–1.3)
ESTIMATED AVERAGE GLUCOSE: 206 MG/DL
GFR SERPL CREATININE-BSD FRML MDRD: >90 ML/MIN/1.73M2
GLUCOSE SERPL-MCNC: 178 MG/DL (ref 70–99)
HBA1C MFR BLD: 8.8 % (ref 4.2–6.3)
HDLC SERPL-MCNC: 51 MG/DL
LDLC SERPL CALC-MCNC: 100 MG/DL
POTASSIUM SERPL-SCNC: 4.6 MMOL/L (ref 3.5–5.1)
SODIUM BLD-SCNC: 140 MMOL/L (ref 135–145)
TOTAL PROTEIN: 7.2 GM/DL (ref 6.4–8.2)
TRIGLYCERIDE, FASTING: 81 MG/DL

## 2024-04-29 PROCEDURE — 80061 LIPID PANEL: CPT

## 2024-04-29 PROCEDURE — 36415 COLL VENOUS BLD VENIPUNCTURE: CPT

## 2024-04-29 PROCEDURE — 80053 COMPREHEN METABOLIC PANEL: CPT

## 2024-04-29 PROCEDURE — 83036 HEMOGLOBIN GLYCOSYLATED A1C: CPT

## 2024-05-03 RX ORDER — UMECLIDINIUM BROMIDE AND VILANTEROL TRIFENATATE 62.5; 25 UG/1; UG/1
POWDER RESPIRATORY (INHALATION)
Qty: 60 EACH | Refills: 11 | OUTPATIENT
Start: 2024-05-03

## 2024-05-14 ENCOUNTER — OFFICE VISIT (OUTPATIENT)
Dept: PULMONOLOGY | Age: 64
End: 2024-05-14
Payer: MEDICARE

## 2024-05-14 ENCOUNTER — TELEPHONE (OUTPATIENT)
Dept: PULMONOLOGY | Age: 64
End: 2024-05-14

## 2024-05-14 VITALS
BODY MASS INDEX: 30.35 KG/M2 | OXYGEN SATURATION: 88 % | HEART RATE: 101 BPM | SYSTOLIC BLOOD PRESSURE: 118 MMHG | HEIGHT: 70 IN | WEIGHT: 212 LBS | DIASTOLIC BLOOD PRESSURE: 68 MMHG

## 2024-05-14 DIAGNOSIS — G47.33 OSA (OBSTRUCTIVE SLEEP APNEA): ICD-10-CM

## 2024-05-14 DIAGNOSIS — G47.10 HYPERSOMNIA: ICD-10-CM

## 2024-05-14 DIAGNOSIS — Z87.891 FORMER SMOKER: ICD-10-CM

## 2024-05-14 DIAGNOSIS — J42 CHRONIC BRONCHITIS, UNSPECIFIED CHRONIC BRONCHITIS TYPE (HCC): Primary | ICD-10-CM

## 2024-05-14 DIAGNOSIS — R09.02 HYPOXIA: ICD-10-CM

## 2024-05-14 PROCEDURE — 99215 OFFICE O/P EST HI 40 MIN: CPT | Performed by: INTERNAL MEDICINE

## 2024-05-14 PROCEDURE — 3078F DIAST BP <80 MM HG: CPT | Performed by: INTERNAL MEDICINE

## 2024-05-14 PROCEDURE — 3074F SYST BP LT 130 MM HG: CPT | Performed by: INTERNAL MEDICINE

## 2024-05-14 ASSESSMENT — ENCOUNTER SYMPTOMS
BACK PAIN: 0
ABDOMINAL DISTENTION: 0
EYE ITCHING: 0
COUGH: 0
EYE DISCHARGE: 0
ABDOMINAL PAIN: 0

## 2024-05-14 NOTE — PROGRESS NOTES
Jeromy ISAAC Maryellen  1960  Referring Provider: Jose Ramon Gaming, [facility] Provider     Subjective:     Chief Complaint   Patient presents with    Follow-up     Ss complete, patient was 88% at rest on room air        HPI  Jeromy ISAAC is a 64 y.o. male has come back as a follow up. He was seen in the hospital for SOB. He has MRSA pneumonia. He has a 30 pk yr smoking which he quit 5 years ago. He is on 2 L.min of oxygen 24/7. He is on Anoro and Albuterol prn. He had his flu vaccine. He has little cough, little phlegm, no hemoptysis, no loss of weight, good appetite, SOBOE some.    His PSG done on 04/24/24 showed that he has no ANIVAL but he desaturated to 72% requirign 2 L.min of oxygen. He is less sleepy during the day time now.        Current Outpatient Medications   Medication Sig Dispense Refill    predniSONE (DELTASONE) 5 MG tablet Take 6 PO now, 5 PO qam for 2 days, 3 PO qam for 3 days, 2 PO qam for 4 days, 1 PO qam for 5 days, then STOP 38 tablet 0    umeclidinium-vilanterol (ANORO ELLIPTA) 62.5-25 MCG/ACT inhaler Inhale 1 puff into the lungs daily 1 each 11    predniSONE (DELTASONE) 5 MG tablet Take 1 tablet by mouth daily      metFORMIN (GLUCOPHAGE) 500 MG tablet Take 1 tablet by mouth 2 times daily (with meals)      blood glucose test strips (ONETOUCH ULTRA) strip TEST 3 TIMES A DAY AND AS NEEDED FOR SYMPTOMS OF IRREGULAR BLOOD SUAGR 100 strip 5    glimepiride (AMARYL) 2 MG tablet TAKE 1 TABLET BY MOUTH TWICE A DAY (Patient taking differently: Take 1 tablet by mouth 2 times daily) 180 tablet 3    albuterol sulfate HFA (VENTOLIN HFA) 108 (90 Base) MCG/ACT inhaler Inhale 2 puffs into the lungs every 6 hours as needed for Wheezing 1 each 11    Lancets MISC Use one lancet 4 times daily 100 each 5    blood glucose monitor kit and supplies Use 3-4 times daily (Patient not taking: Reported on 4/24/2024) 1 kit 0     No current facility-administered medications for this visit.       No Known Allergies    Past

## 2024-06-20 DIAGNOSIS — J44.9 CHRONIC OBSTRUCTIVE PULMONARY DISEASE, UNSPECIFIED COPD TYPE (HCC): ICD-10-CM

## 2024-06-24 RX ORDER — ALBUTEROL SULFATE 90 UG/1
AEROSOL, METERED RESPIRATORY (INHALATION)
Qty: 18 EACH | Refills: 11 | Status: SHIPPED | OUTPATIENT
Start: 2024-06-24

## 2024-10-23 ENCOUNTER — OFFICE VISIT (OUTPATIENT)
Dept: ONCOLOGY | Age: 64
End: 2024-10-23
Payer: MEDICARE

## 2024-10-23 ENCOUNTER — HOSPITAL ENCOUNTER (OUTPATIENT)
Dept: INFUSION THERAPY | Age: 64
Discharge: HOME | End: 2024-10-23
Payer: MEDICARE

## 2024-10-23 VITALS
BODY MASS INDEX: 29.41 KG/M2 | RESPIRATION RATE: 18 BRPM | HEIGHT: 70 IN | HEART RATE: 98 BPM | OXYGEN SATURATION: 95 % | TEMPERATURE: 98.8 F | DIASTOLIC BLOOD PRESSURE: 88 MMHG | WEIGHT: 205.4 LBS | SYSTOLIC BLOOD PRESSURE: 181 MMHG

## 2024-10-23 DIAGNOSIS — J44.1 COPD EXACERBATION (HCC): Primary | ICD-10-CM

## 2024-10-23 DIAGNOSIS — J44.1 COPD EXACERBATION (HCC): ICD-10-CM

## 2024-10-23 LAB
ALBUMIN SERPL-MCNC: 4.1 G/DL (ref 3.4–5)
ALBUMIN/GLOB SERPL: 1.6 {RATIO} (ref 1.1–2.2)
ALP SERPL-CCNC: 71 U/L (ref 40–129)
ALT SERPL-CCNC: 9 U/L (ref 10–40)
ANION GAP SERPL CALCULATED.3IONS-SCNC: 10 MMOL/L (ref 9–17)
AST SERPL-CCNC: 14 U/L (ref 15–37)
BASOPHILS # BLD: 0.05 K/UL
BASOPHILS NFR BLD: 1 % (ref 0–1)
BILIRUB SERPL-MCNC: 0.6 MG/DL (ref 0–1)
BUN SERPL-MCNC: 10 MG/DL (ref 7–20)
CALCIUM SERPL-MCNC: 9.2 MG/DL (ref 8.3–10.6)
CHLORIDE SERPL-SCNC: 93 MMOL/L (ref 99–110)
CO2 SERPL-SCNC: 34 MMOL/L (ref 21–32)
CREAT SERPL-MCNC: 0.7 MG/DL (ref 0.8–1.3)
EOSINOPHIL # BLD: 0.28 K/UL
EOSINOPHILS RELATIVE PERCENT: 3 % (ref 0–3)
ERYTHROCYTE [DISTWIDTH] IN BLOOD BY AUTOMATED COUNT: 16.9 % (ref 11.7–14.9)
GFR, ESTIMATED: >90 ML/MIN/1.73M2
GLUCOSE SERPL-MCNC: 184 MG/DL (ref 74–99)
HCT VFR BLD AUTO: 48.1 % (ref 42–52)
HGB BLD-MCNC: 15.1 G/DL (ref 13.5–18)
LDH SERPL-CCNC: 195 U/L (ref 100–190)
LYMPHOCYTES NFR BLD: 1.48 K/UL
LYMPHOCYTES RELATIVE PERCENT: 17 % (ref 24–44)
MCH RBC QN AUTO: 27.2 PG (ref 27–31)
MCHC RBC AUTO-ENTMCNC: 31.4 G/DL (ref 32–36)
MCV RBC AUTO: 86.5 FL (ref 78–100)
MONOCYTES NFR BLD: 1.45 K/UL
MONOCYTES NFR BLD: 16 % (ref 0–4)
NEUTROPHILS NFR BLD: 63 % (ref 36–66)
NEUTS SEG NFR BLD: 5.56 K/UL
PLATELET # BLD AUTO: 222 K/UL (ref 140–440)
PMV BLD AUTO: 8.9 FL (ref 7.5–11.1)
POTASSIUM SERPL-SCNC: 4.4 MMOL/L (ref 3.5–5.1)
PROT SERPL-MCNC: 6.6 G/DL (ref 6.4–8.2)
RBC # BLD AUTO: 5.56 M/UL (ref 4.6–6.2)
SODIUM SERPL-SCNC: 138 MMOL/L (ref 136–145)
WBC OTHER # BLD: 8.8 K/UL (ref 4–10.5)

## 2024-10-23 PROCEDURE — 99211 OFF/OP EST MAY X REQ PHY/QHP: CPT

## 2024-10-23 PROCEDURE — 99213 OFFICE O/P EST LOW 20 MIN: CPT | Performed by: INTERNAL MEDICINE

## 2024-10-23 PROCEDURE — 83615 LACTATE (LD) (LDH) ENZYME: CPT

## 2024-10-23 PROCEDURE — 80053 COMPREHEN METABOLIC PANEL: CPT

## 2024-10-23 PROCEDURE — 3077F SYST BP >= 140 MM HG: CPT | Performed by: INTERNAL MEDICINE

## 2024-10-23 PROCEDURE — 3079F DIAST BP 80-89 MM HG: CPT | Performed by: INTERNAL MEDICINE

## 2024-10-23 PROCEDURE — 36415 COLL VENOUS BLD VENIPUNCTURE: CPT

## 2024-10-23 PROCEDURE — 85025 COMPLETE CBC W/AUTO DIFF WBC: CPT

## 2024-10-23 ASSESSMENT — PATIENT HEALTH QUESTIONNAIRE - PHQ9
SUM OF ALL RESPONSES TO PHQ QUESTIONS 1-9: 2
1. LITTLE INTEREST OR PLEASURE IN DOING THINGS: SEVERAL DAYS
SUM OF ALL RESPONSES TO PHQ QUESTIONS 1-9: 2
SUM OF ALL RESPONSES TO PHQ QUESTIONS 1-9: 2
SUM OF ALL RESPONSES TO PHQ9 QUESTIONS 1 & 2: 2
SUM OF ALL RESPONSES TO PHQ QUESTIONS 1-9: 2
2. FEELING DOWN, DEPRESSED OR HOPELESS: SEVERAL DAYS

## 2024-10-23 NOTE — PROGRESS NOTES
MA Rooming Questions  Patient: Jeromy Bojorquez  MRN: [id number]    Date: 10/23/2024        1. Do you have any new issues?   yes - c/o pain in neck, back, shoulders, dizzy, fatigue        2. Do you need any refills on medications?    no    3. Have you had any imaging done since your last visit?   no    4. Have you been hospitalized or seen in the emergency room since your last visit here?   no    5. Did the patient have a depression screening completed today? Yes    No data recorded     PHQ-9 Given to (if applicable):               PHQ-9 Score (if applicable):                     [] Positive     []  Negative              Does question #9 need addressed (if applicable)                     [] Yes    []  No               Sarah Bill CMA

## 2025-04-21 NOTE — PROGRESS NOTES
Patient Name: Jeromy Bojorquez  Patient : 1960  Patient MRN: [id number]     Primary Oncologist: Brady Bermeo MD  Referring Provider: No primary care provider on file.     Date of Service: 2025      Chief Complaint:   Chief Complaint   Patient presents with    Follow-up    Results     Patient Active Problem List:     Shortness of breath     Continuous dependence on cigarette smoking     Secondary polycythemia    HPI:   Mr. Bojorquez is a 65 year-old very pleasant gentleman with medical history significant for hypertension, hyperlipidemia, diabetes mellitus, COPD, osteoarthritis, obesity, initially referred to me on December 3, 2018 for evaluation of erythrocytosis.    Mr. Bojorquez was found to have erythrocytosis since . He recently established care with primary care physician, Dr. Yeung on 2018. Routine blood tests done on 2018 showed persistent erythrocytosis.    He is a chronic smoker and he started smoking since he was 16. He used to smoke ~ 2 PPD before and he is currently smoking about 10 - 15 cigarettes per day.     Since he is found to have persistent erythrocytosis, he was subsequently referred to me for further evaluation.  He denied high altitude living.  He does not have any family history of erythrocytosis.    Laboratory work ups done on 12/3/18 showed normal erythropoietin level, normal ESR, LDH, iron study. JAK2 V617F mutation was negative.     Abdominal sonogram done on 18 showed no splenomegaly or splenic mass. Limited evaluation liver, though grossly unremarkable. Focal area of gallbladder wall thickening measuring 5.9 mm, though limited with regards to scanning technique per the technologist. The remainder the gallbladder appears unremarkable. Likely clinically insignificant. If clinically concerned, evaluation for cystic duct obstruction could be performed with nuclear medicine imaging.    Low dose screening CT scan of chest done on 3/4/19 showed

## 2025-04-23 ENCOUNTER — HOSPITAL ENCOUNTER (OUTPATIENT)
Dept: INFUSION THERAPY | Age: 65
Discharge: HOME | End: 2025-04-23
Payer: MEDICARE

## 2025-04-23 ENCOUNTER — OFFICE VISIT (OUTPATIENT)
Dept: ONCOLOGY | Age: 65
End: 2025-04-23
Payer: MEDICARE

## 2025-04-23 VITALS
OXYGEN SATURATION: 90 % | SYSTOLIC BLOOD PRESSURE: 126 MMHG | BODY MASS INDEX: 28.98 KG/M2 | HEART RATE: 95 BPM | HEIGHT: 70 IN | TEMPERATURE: 98.8 F | DIASTOLIC BLOOD PRESSURE: 97 MMHG | WEIGHT: 202.4 LBS

## 2025-04-23 DIAGNOSIS — D75.1 SECONDARY POLYCYTHEMIA: Primary | ICD-10-CM

## 2025-04-23 DIAGNOSIS — R21 SKIN RASH: ICD-10-CM

## 2025-04-23 DIAGNOSIS — D75.1 SECONDARY POLYCYTHEMIA: ICD-10-CM

## 2025-04-23 DIAGNOSIS — Z87.891 PERSONAL HISTORY OF TOBACCO USE: ICD-10-CM

## 2025-04-23 LAB
ALBUMIN SERPL-MCNC: 4 G/DL (ref 3.4–5)
ALBUMIN/GLOB SERPL: 1.3 {RATIO} (ref 1.1–2.2)
ALP SERPL-CCNC: 78 U/L (ref 40–129)
ALT SERPL-CCNC: 9 U/L (ref 10–40)
ANION GAP SERPL CALCULATED.3IONS-SCNC: 10 MMOL/L (ref 9–17)
AST SERPL-CCNC: 19 U/L (ref 15–37)
BASOPHILS # BLD: 0.07 K/UL
BASOPHILS NFR BLD: 1 % (ref 0–1)
BILIRUB SERPL-MCNC: 0.6 MG/DL (ref 0–1)
BUN SERPL-MCNC: 13 MG/DL (ref 7–20)
CALCIUM SERPL-MCNC: 9.2 MG/DL (ref 8.3–10.6)
CHLORIDE SERPL-SCNC: 93 MMOL/L (ref 99–110)
CO2 SERPL-SCNC: 32 MMOL/L (ref 21–32)
CREAT SERPL-MCNC: 0.8 MG/DL (ref 0.8–1.3)
EOSINOPHIL # BLD: 0.42 K/UL
EOSINOPHILS RELATIVE PERCENT: 5 % (ref 0–3)
ERYTHROCYTE [DISTWIDTH] IN BLOOD BY AUTOMATED COUNT: 15.7 % (ref 11.7–14.9)
GFR, ESTIMATED: >90 ML/MIN/1.73M2
GLUCOSE SERPL-MCNC: 214 MG/DL (ref 74–99)
HCT VFR BLD AUTO: 51 % (ref 42–52)
HGB BLD-MCNC: 16.1 G/DL (ref 13.5–18)
LDH SERPL-CCNC: 222 U/L (ref 100–190)
LYMPHOCYTES NFR BLD: 1.85 K/UL
LYMPHOCYTES RELATIVE PERCENT: 21 % (ref 24–44)
MCH RBC QN AUTO: 27.5 PG (ref 27–31)
MCHC RBC AUTO-ENTMCNC: 31.6 G/DL (ref 32–36)
MCV RBC AUTO: 87.2 FL (ref 78–100)
MONOCYTES NFR BLD: 1.51 K/UL
MONOCYTES NFR BLD: 17 % (ref 0–5)
NEUTROPHILS NFR BLD: 57 % (ref 36–66)
NEUTS SEG NFR BLD: 5.12 K/UL
PLATELET # BLD AUTO: 195 K/UL (ref 140–440)
PMV BLD AUTO: 9.8 FL (ref 7.5–11.1)
POTASSIUM SERPL-SCNC: 4 MMOL/L (ref 3.5–5.1)
PROT SERPL-MCNC: 7.1 G/DL (ref 6.4–8.2)
RBC # BLD AUTO: 5.85 M/UL (ref 4.6–6.2)
SODIUM SERPL-SCNC: 135 MMOL/L (ref 136–145)
WBC OTHER # BLD: 9 K/UL (ref 4–10.5)

## 2025-04-23 PROCEDURE — 85025 COMPLETE CBC W/AUTO DIFF WBC: CPT

## 2025-04-23 PROCEDURE — 80053 COMPREHEN METABOLIC PANEL: CPT

## 2025-04-23 PROCEDURE — 99212 OFFICE O/P EST SF 10 MIN: CPT

## 2025-04-23 PROCEDURE — 83615 LACTATE (LD) (LDH) ENZYME: CPT

## 2025-04-23 PROCEDURE — 36415 COLL VENOUS BLD VENIPUNCTURE: CPT

## 2025-04-23 PROCEDURE — 99214 OFFICE O/P EST MOD 30 MIN: CPT | Performed by: INTERNAL MEDICINE

## 2025-04-23 PROCEDURE — 3080F DIAST BP >= 90 MM HG: CPT | Performed by: INTERNAL MEDICINE

## 2025-04-23 PROCEDURE — G0296 VISIT TO DETERM LDCT ELIG: HCPCS | Performed by: INTERNAL MEDICINE

## 2025-04-23 PROCEDURE — 3074F SYST BP LT 130 MM HG: CPT | Performed by: INTERNAL MEDICINE

## 2025-04-23 ASSESSMENT — PATIENT HEALTH QUESTIONNAIRE - PHQ9
SUM OF ALL RESPONSES TO PHQ QUESTIONS 1-9: 0
SUM OF ALL RESPONSES TO PHQ QUESTIONS 1-9: 0
1. LITTLE INTEREST OR PLEASURE IN DOING THINGS: NOT AT ALL
SUM OF ALL RESPONSES TO PHQ QUESTIONS 1-9: 0
2. FEELING DOWN, DEPRESSED OR HOPELESS: NOT AT ALL
SUM OF ALL RESPONSES TO PHQ QUESTIONS 1-9: 0

## 2025-04-23 NOTE — PROGRESS NOTES
MA Rooming Questions  Patient: Jeromy Bojorquez  MRN: [id number]    Date: 4/23/2025        1. Do you have any new issues?   no         2. Do you need any refills on medications?    no    3. Have you had any imaging done since your last visit?   yes - labs 10/23    4. Have you been hospitalized or seen in the emergency room since your last visit here?   no    5. Did the patient have a depression screening completed today? Yes    PHQ-9 Total Score: 0 (4/23/2025 10:50 AM)       PHQ-9 Given to (if applicable):               PHQ-9 Score (if applicable):                     [] Positive     []  Negative              Does question #9 need addressed (if applicable)                     [] Yes    []  No               Tish Moncada CMA

## 2025-04-25 ENCOUNTER — TELEPHONE (OUTPATIENT)
Dept: ONCOLOGY | Age: 65
End: 2025-04-25

## 2025-05-16 ENCOUNTER — APPOINTMENT (OUTPATIENT)
Dept: ULTRASOUND IMAGING | Age: 65
End: 2025-05-16
Payer: MEDICARE

## 2025-05-16 ENCOUNTER — APPOINTMENT (OUTPATIENT)
Dept: CT IMAGING | Age: 65
End: 2025-05-16
Payer: MEDICARE

## 2025-05-16 ENCOUNTER — HOSPITAL ENCOUNTER (EMERGENCY)
Age: 65
Discharge: HOME | End: 2025-05-17
Payer: MEDICARE

## 2025-05-16 DIAGNOSIS — G89.29 CHRONIC PAIN IN TESTICLE: Primary | ICD-10-CM

## 2025-05-16 DIAGNOSIS — N50.819 CHRONIC PAIN IN TESTICLE: Primary | ICD-10-CM

## 2025-05-16 DIAGNOSIS — G89.29 CHRONIC ABDOMINAL PAIN: ICD-10-CM

## 2025-05-16 DIAGNOSIS — R10.9 CHRONIC ABDOMINAL PAIN: ICD-10-CM

## 2025-05-16 LAB
ALBUMIN SERPL-MCNC: 4 G/DL (ref 3.4–5)
ALBUMIN/GLOB SERPL: 1.2 {RATIO} (ref 1.1–2.2)
ALP SERPL-CCNC: 74 U/L (ref 40–129)
ALT SERPL-CCNC: 8 U/L (ref 10–40)
ANION GAP SERPL CALCULATED.3IONS-SCNC: 11 MMOL/L (ref 9–17)
AST SERPL-CCNC: 17 U/L (ref 15–37)
BASOPHILS # BLD: 0.09 K/UL
BASOPHILS NFR BLD: 1 % (ref 0–1)
BILIRUB SERPL-MCNC: 1 MG/DL (ref 0–1)
BILIRUB UR QL STRIP: ABNORMAL
BUN SERPL-MCNC: 11 MG/DL (ref 7–20)
CALCIUM SERPL-MCNC: 9.3 MG/DL (ref 8.3–10.6)
CHLORIDE SERPL-SCNC: 92 MMOL/L (ref 99–110)
CLARITY UR: CLEAR
CO2 SERPL-SCNC: 34 MMOL/L (ref 21–32)
COLOR UR: YELLOW
COMMENT: ABNORMAL
CREAT SERPL-MCNC: 0.9 MG/DL (ref 0.8–1.3)
EOSINOPHIL # BLD: 0.08 K/UL
EOSINOPHILS RELATIVE PERCENT: 1 % (ref 0–3)
ERYTHROCYTE [DISTWIDTH] IN BLOOD BY AUTOMATED COUNT: 14.8 % (ref 11.7–14.9)
GFR, ESTIMATED: 84 ML/MIN/1.73M2
GLUCOSE SERPL-MCNC: 337 MG/DL (ref 74–99)
GLUCOSE UR STRIP-MCNC: >=1000 MG/DL
HCT VFR BLD AUTO: 52 % (ref 42–52)
HGB BLD-MCNC: 16 G/DL (ref 13.5–18)
HGB UR QL STRIP.AUTO: NEGATIVE
IMM GRANULOCYTES # BLD AUTO: 0.06 K/UL
IMM GRANULOCYTES NFR BLD: 1 %
KETONES UR STRIP-MCNC: 15 MG/DL
LEUKOCYTE ESTERASE UR QL STRIP: NEGATIVE
LYMPHOCYTES NFR BLD: 1.1 K/UL
LYMPHOCYTES RELATIVE PERCENT: 8 % (ref 24–44)
MCH RBC QN AUTO: 27.8 PG (ref 27–31)
MCHC RBC AUTO-ENTMCNC: 30.8 G/DL (ref 32–36)
MCV RBC AUTO: 90.3 FL (ref 78–100)
MONOCYTES NFR BLD: 1.47 K/UL
MONOCYTES NFR BLD: 11 % (ref 0–5)
NEUTROPHILS NFR BLD: 79 % (ref 36–66)
NEUTS SEG NFR BLD: 10.49 K/UL
NITRITE UR QL STRIP: NEGATIVE
PH UR STRIP: 5.5 [PH] (ref 5–8)
PLATELET # BLD AUTO: 195 K/UL (ref 140–440)
PMV BLD AUTO: 10.2 FL (ref 7.5–11.1)
POTASSIUM SERPL-SCNC: 4.4 MMOL/L (ref 3.5–5.1)
PROT SERPL-MCNC: 7.3 G/DL (ref 6.4–8.2)
PROT UR STRIP-MCNC: NEGATIVE MG/DL
RBC # BLD AUTO: 5.76 M/UL (ref 4.6–6.2)
SODIUM SERPL-SCNC: 138 MMOL/L (ref 136–145)
SP GR UR STRIP: >1.03 (ref 1–1.03)
UROBILINOGEN UR STRIP-ACNC: 1 EU/DL (ref 0–1)
WBC OTHER # BLD: 13.3 K/UL (ref 4–10.5)

## 2025-05-16 PROCEDURE — 76870 US EXAM SCROTUM: CPT

## 2025-05-16 PROCEDURE — 99285 EMERGENCY DEPT VISIT HI MDM: CPT

## 2025-05-16 PROCEDURE — 74177 CT ABD & PELVIS W/CONTRAST: CPT

## 2025-05-16 PROCEDURE — 6360000004 HC RX CONTRAST MEDICATION: Performed by: PHYSICIAN ASSISTANT

## 2025-05-16 PROCEDURE — 80053 COMPREHEN METABOLIC PANEL: CPT

## 2025-05-16 PROCEDURE — 6360000002 HC RX W HCPCS: Performed by: PHYSICIAN ASSISTANT

## 2025-05-16 PROCEDURE — 96374 THER/PROPH/DIAG INJ IV PUSH: CPT

## 2025-05-16 PROCEDURE — 93975 VASCULAR STUDY: CPT

## 2025-05-16 PROCEDURE — 85025 COMPLETE CBC W/AUTO DIFF WBC: CPT

## 2025-05-16 PROCEDURE — 81003 URINALYSIS AUTO W/O SCOPE: CPT

## 2025-05-16 RX ORDER — MORPHINE SULFATE 4 MG/ML
4 INJECTION, SOLUTION INTRAMUSCULAR; INTRAVENOUS
Status: DISCONTINUED | OUTPATIENT
Start: 2025-05-16 | End: 2025-05-17 | Stop reason: HOSPADM

## 2025-05-16 RX ORDER — IOPAMIDOL 755 MG/ML
75 INJECTION, SOLUTION INTRAVASCULAR
Status: COMPLETED | OUTPATIENT
Start: 2025-05-16 | End: 2025-05-16

## 2025-05-16 RX ADMIN — MORPHINE SULFATE 4 MG: 4 INJECTION, SOLUTION INTRAMUSCULAR; INTRAVENOUS at 20:44

## 2025-05-16 RX ADMIN — IOPAMIDOL 75 ML: 755 INJECTION, SOLUTION INTRAVENOUS at 22:31

## 2025-05-16 ASSESSMENT — PAIN - FUNCTIONAL ASSESSMENT: PAIN_FUNCTIONAL_ASSESSMENT: 0-10

## 2025-05-16 ASSESSMENT — PAIN SCALES - GENERAL
PAINLEVEL_OUTOF10: 8
PAINLEVEL_OUTOF10: 6
PAINLEVEL_OUTOF10: 10

## 2025-05-16 ASSESSMENT — PAIN DESCRIPTION - LOCATION: LOCATION: ABDOMEN;GROIN

## 2025-05-17 VITALS
WEIGHT: 202 LBS | TEMPERATURE: 98.3 F | OXYGEN SATURATION: 93 % | BODY MASS INDEX: 28.92 KG/M2 | HEART RATE: 89 BPM | RESPIRATION RATE: 18 BRPM | SYSTOLIC BLOOD PRESSURE: 118 MMHG | HEIGHT: 70 IN | DIASTOLIC BLOOD PRESSURE: 94 MMHG

## 2025-05-17 PROCEDURE — 96376 TX/PRO/DX INJ SAME DRUG ADON: CPT

## 2025-05-17 PROCEDURE — 6360000002 HC RX W HCPCS: Performed by: PHYSICIAN ASSISTANT

## 2025-05-17 PROCEDURE — 96375 TX/PRO/DX INJ NEW DRUG ADDON: CPT

## 2025-05-17 RX ORDER — KETOROLAC TROMETHAMINE 15 MG/ML
15 INJECTION, SOLUTION INTRAMUSCULAR; INTRAVENOUS ONCE
Status: COMPLETED | OUTPATIENT
Start: 2025-05-17 | End: 2025-05-17

## 2025-05-17 RX ORDER — GABAPENTIN 300 MG/1
300 CAPSULE ORAL 3 TIMES DAILY
Qty: 90 CAPSULE | Refills: 0 | Status: SHIPPED | OUTPATIENT
Start: 2025-05-17 | End: 2025-06-16

## 2025-05-17 RX ADMIN — MORPHINE SULFATE 4 MG: 4 INJECTION, SOLUTION INTRAMUSCULAR; INTRAVENOUS at 00:29

## 2025-05-17 RX ADMIN — KETOROLAC TROMETHAMINE 15 MG: 15 INJECTION, SOLUTION INTRAMUSCULAR; INTRAVENOUS at 00:25

## 2025-05-17 ASSESSMENT — PAIN SCALES - GENERAL
PAINLEVEL_OUTOF10: 5
PAINLEVEL_OUTOF10: 7
PAINLEVEL_OUTOF10: 0
PAINLEVEL_OUTOF10: 5

## 2025-05-17 ASSESSMENT — PAIN - FUNCTIONAL ASSESSMENT
PAIN_FUNCTIONAL_ASSESSMENT: 0-10
PAIN_FUNCTIONAL_ASSESSMENT: 0-10

## 2025-05-17 NOTE — ED PROVIDER NOTES
EMERGENCY DEPARTMENT ENCOUNTER        Pt Name: Jeromy Bojorquez  MRN: [id number]  Birthdate 1960  Date of evaluation: 5/16/2025  Provider: Mora Stephens PA-C  PCP: No primary care provider on file.    JANIA. I have evaluated this patient.        Triage CHIEF COMPLAINT       Chief Complaint   Patient presents with    Abdominal Pain    Groin Pain         HISTORY OF PRESENT ILLNESS      Chief Complaint: Abdominal pain/testicular pain    Jeromy Bojorquez is a 65 y.o. male who presents for right sided abd pain and testicular pain.  He says this has been going on for years.  He says he has been evaluated both here and at facilities in [place].  He used to follow with Dr. Harper but no longer does.  At one point they advised he could have surgery but they didn't know if this would take away his pain.  He has chronic swelling of the left testicle, non of the right.  He denies any difficulties urinating--although he says he has both a hidden penis and prostate issues.  He denies n/v/d.  Says he tolerates PO intake without difficulty.       Nursing Notes were all reviewed and agreed with or any disagreements were addressed in the HPI.    REVIEW OF SYSTEMS     CONSTITUTIONAL:  Denies fever.  EYES:  Denies visual changes.  HEAD:  Denies headache.  ENT:  Denies earache, nasal congestion, sore throat.  NECK:  Denies neck pain.  RESPIRATORY:  Denies any shortness of breath.  CARDIOVASCULAR:  Denies chest pain.  GI:  Denies nausea or vomiting.  + abd pain  :  Denies urinary symptoms.  + testicular pain  MUSCULOSKELETAL:  Denies extremity pain or swelling.  BACK:  Denies back pain.  INTEGUMENT:  Denies skin changes.  LYMPHATIC:  Denies lymphadenopathy.  NEUROLOGIC:  Denies any numbness/tingling.  PSYCHIATRIC:  Denies SI/HI.    PAST MEDICAL HISTORY     Past Medical History:   Diagnosis Date    Acute exacerbation of chronic obstructive pulmonary disease (COPD) ([facility]) 3/23/2023    Acute on chronic respiratory  radiographic images are visualized and preliminarily interpreted by the ED Provider.    Interpretation per the Radiologist below:    CT ABDOMEN PELVIS W IV CONTRAST Additional Contrast? None   Final Result      US SCROTUM AND TESTICLES   Final Result      US DUP ABD PEL RETRO SCROT COMPLETE    (Results Pending)     No results found.      PROCEDURES   Unless otherwise noted below, none.     CONSULTS:  None      EMERGENCY DEPARTMENT COURSE and MDM:   Vitals:    Vitals:    05/17/25 0054 05/17/25 0056 05/17/25 0057 05/17/25 0057   BP:  (!) 118/94  (!) 118/94   Pulse:    89   Resp:   18 18   Temp:       TempSrc:       SpO2: 93%   93%   Weight:       Height:           Patient was given thefollowing medications:  Medications   morphine sulfate (PF) injection 4 mg (4 mg IntraVENous Given 5/17/25 0029)   iopamidol (ISOVUE-370) 76 % injection 75 mL (75 mLs IntraVENous Given 5/16/25 2231)   ketorolac (TORADOL) injection 15 mg (15 mg IntraVENous Given 5/17/25 0025)           MDM:    Chief Complaint/HPI Summary/Differential Diagnosis:  Patient presents to the ED with chief complaint of testicular/abd pain.  Patient seen and evaluated.  Triage and nursing notes reviewed and incorporated.       History from : Patient    Limitations to history : None    Patient was given the following medications:  Medications   morphine sulfate (PF) injection 4 mg (4 mg IntraVENous Given 5/17/25 0029)   iopamidol (ISOVUE-370) 76 % injection 75 mL (75 mLs IntraVENous Given 5/16/25 2231)   ketorolac (TORADOL) injection 15 mg (15 mg IntraVENous Given 5/17/25 0025)       Independent Imaging Interpretation by me:  none  I did visualize imaging studies as noted above, but interpretation performed by radiologist.      EKG (if obtained):  Please see supervising physician's note for interpretation.    Chronic conditions affecting care:   Past Medical History:   Diagnosis Date    Acute exacerbation of chronic obstructive pulmonary disease (COPD) (H[facility])
